# Patient Record
Sex: MALE | Race: WHITE | Employment: FULL TIME | URBAN - METROPOLITAN AREA
[De-identification: names, ages, dates, MRNs, and addresses within clinical notes are randomized per-mention and may not be internally consistent; named-entity substitution may affect disease eponyms.]

---

## 2017-11-16 ENCOUNTER — OFFICE VISIT (OUTPATIENT)
Dept: FAMILY MEDICINE CLINIC | Age: 47
End: 2017-11-16

## 2017-11-16 VITALS
OXYGEN SATURATION: 95 % | HEIGHT: 66 IN | DIASTOLIC BLOOD PRESSURE: 75 MMHG | WEIGHT: 194.4 LBS | SYSTOLIC BLOOD PRESSURE: 113 MMHG | RESPIRATION RATE: 20 BRPM | TEMPERATURE: 97 F | BODY MASS INDEX: 31.24 KG/M2 | HEART RATE: 70 BPM

## 2017-11-16 DIAGNOSIS — G89.29 CHRONIC PAIN OF LEFT ANKLE: ICD-10-CM

## 2017-11-16 DIAGNOSIS — M25.572 CHRONIC PAIN OF LEFT ANKLE: ICD-10-CM

## 2017-11-16 DIAGNOSIS — M25.522 ELBOW PAIN, LEFT: Primary | ICD-10-CM

## 2017-11-16 NOTE — PROGRESS NOTES
Chief Complaint   Patient presents with    Elbow Pain     left x 2 months    Ankle Pain     left x 6 months       Reviewed record in preparation for visit and have obtained necessary documentation. Identified pt with two pt identifiers(name and ). Chief Complaint   Patient presents with    Elbow Pain     left x 2 months    Ankle Pain     left x 6 months       Vitals:    17 1114   BP: 113/75   Pulse: 70   Resp: 20   Temp: 97 °F (36.1 °C)   TempSrc: Oral   SpO2: 95%   Weight: 194 lb 6.4 oz (88.2 kg)   Height: 5' 6\" (1.676 m)   PainSc:   4       Coordination of Care Questionnaire:  :     1) Have you been to an emergency room, urgent care clinic since your last visit? no   Hospitalized since your last visit? no             2) Have you seen or consulted any other health care providers outside of [address] [place] [address] since your last visit? no  (Include any pap smears or colon screenings in this section.)    3) In the event something were to happen to you and you were unable to speak on your behalf, do you have an Advance Directive/ Living Will in place stating your wishes? NO    Do you have an Advance Directive on file? no    4) Are you interested in receiving information on Advance Directives? NO    Patient is accompanied by self I have received verbal consent from Veronica Stokes to discuss any/all medical information while they are present in the room.

## 2017-11-16 NOTE — MR AVS SNAPSHOT
Visit Information Date & Time Provider Department Dept. Phone Encounter #  
 11/16/2017 11:00 AM Palma Chun NP [facility] [phone] [id number] Upcoming Health Maintenance Date Due Pneumococcal 19-64 Medium Risk (1 of 1 - PPSV23) 11/12/1989 DTaP/Tdap/Td series (2 - Tdap) 11/12/1991 Influenza Age 5 to Adult 8/1/2017 Allergies as of 11/16/2017  Review Complete On: 1/20/2016 By: Melissa Salcedo Severity Noted Reaction Type Reactions Cymbalta [Duloxetine]  08/30/2010    Other (comments) Erectile dysfunction Prozac [Fluoxetine]  08/30/2010    Other (comments) Decreased libido and erection Current Immunizations  Reviewed on 11/30/2010 Name Date  
 TD Vaccine 6/1/1988 Not reviewed this visit You Were Diagnosed With   
  
 Codes Comments Elbow pain, left    -  Primary ICD-10-CM: N48.309 ICD-9-CM: 719.42 Chronic pain of left ankle     ICD-10-CM: M25.572, G89.29 ICD-9-CM: 719.47, 338.29 Vitals BP Pulse Temp Resp Height(growth percentile) Weight(growth percentile) 113/75 (BP 1 Location: Right arm, BP Patient Position: Sitting) 70 97 °F (36.1 °C) (Oral) 20 5' 6\" (1.676 m) 194 lb 6.4 oz (88.2 kg) SpO2 BMI Smoking Status 95% 31.38 kg/m2 Current Every Day Smoker BMI and BSA Data Body Mass Index Body Surface Area  
 31.38 kg/m 2 2.03 m 2 Preferred Pharmacy Pharmacy Name Phone [facility] [place], [place] - [address] [place] [address] Javed Singh [phone] Your Updated Medication List  
  
   
This list is accurate as of: 11/16/17 11:54 AM.  Always use your most recent med list.  
  
  
  
  
 ibuprofen 200 mg tablet Commonly known as:  MOTRIN Take  by mouth.  
  
 pramoxine-hydrocortisone 1-1 % topical cream  
Commonly known as:  ANALPRAM HC Apply  to affected area three (3) times daily. We Performed the Following REFERRAL TO SPORTS MEDICINE [HEN340 Custom] Comments:  
 Please evaluate and treat for left elbow and left ankle pain Referral Information Referral ID Referred By Referred To  
  
 [id number] [facility]   
   [address] [place] [address], [address] Phone: [phone] Visits Status Start Date End Date 1 New Request 11/16/17 11/16/18 If your referral has a status of pending review or denied, additional information will be sent to support the outcome of this decision. Patient Instructions 1) Referral to Dr. Beatriz Thomas, Sports Medicine. Please make an appointment with Dr. Chavo Dao. His office is located at [facility] ([address] [place] [address]) ph: [phone] 
 
2) You can use RICE (Rest, ice, Compress, Elevate). Apply a compressive ACE bandage. Rest and elevate the affected painful area. Apply cold compresses intermittently as needed. As pain recedes, begin normal activities slowly as tolerated. Stretch ankle:  
  Exercises Your Care Instructions Here are some examples of exercises for your achilles tendon. Start each exercise slowly. Ease off the exercise if you start to have pain. Your doctor or physical therapist will tell you when you can start these exercises and which ones will work best for you. How to do the exercises Toe stretch 1. Sit in a chair, and extend your affected leg so that your heel is on the floor. 2. With your hand, reach down and pull your big toe up and back. Pull toward your ankle and away from the floor. 3. Hold the position for at least 15 to 30 seconds. 4. Repeat 2 to 4 times a session, several times a day. Calf-plantar fascia stretch 1. Sit with your legs extended and knees straight. 2. Place a towel around your foot just under the toes. 3. Hold each end of the towel in each hand, with your hands above your knees. 4. Pull back with the towel so that your foot stretches toward you. 5. Hold the position for at least 15 to 30 seconds. 6. Repeat 2 to 4 times a session, up to 5 sessions a day. Floor stretch 1. Stand about 2 feet from a wall, and place your hands on the wall at about shoulder height. Or you can stand behind a chair, placing your hands on the back of it for balance. 2. Step back with the leg you want to stretch. Keep the leg straight, and press your heel into the floor with your toe turned slightly in. 
3. Lean forward, and bend your other leg slightly. Feel the stretch in the Achilles tendon of your back leg. Hold for at least 15 to 30 seconds. 4. Repeat 2 to 4 times a session, up to 5 sessions a day. Stair stretch 1. Stand with the balls of both feet on the edge of a step or curb (or a medium-sized phone book). With at least one hand, hold onto something solid for balance, such as a banister or handrail. 2. Keeping your affected leg straight, slowly let that heel hang down off of the step or curb until you feel a stretch in the back of your calf and/or Achilles area. Some of your weight should still be on the other leg. 3. Hold this position for at least 15 to 30 seconds. 4. Repeat 2 to 4 times a session, up to 5 times a day or whenever your Achilles tendon starts to feel tight. This stretch can also be done with your knee slightly bent. Strength exercise 1. This exercise will get you started on building strength after an Achilles tendon injury. Your doctor or physical therapist can help you move on to more challenging exercises as you heal and get stronger. 2. Stand on a step with your heel off the edge of the step. Hold on to a handrail or wall for balance. 3. Push up on your toes, then slowly count to 10 as you lower yourself back down until your heel is below the step.  If it hurts to push up on your toes, try putting most of your weight on your other foot as you push up, or try using your arms to help you. If you can't do this exercise without causing pain, stop the exercise and talk to your doctor. 4. Repeat the exercise 8 to 12 times, half with the knee straight and half with the knee bent. Follow-up care is a key part of your treatment and safety. Be sure to make and go to all appointments, and call your doctor if you are having problems. It's also a good idea to know your test results and keep a list of the medicines you take. Where can you learn more? Go to http://joao-lilliana.info/. Enter D662 in the search box to learn more about \"Achilles Tendon: Exercises. \" Current as of: March 21, 2017 Content Version: 11.4 © 5737-6839 [employer]. Care instructions adapted under license by [employer] (which disclaims liability or warranty for this information). If you have questions about a medical condition or this instruction, always ask your healthcare professional. [place] [address] any warranty or liability for your use of this information. Healthy Weight Body Mass Index is a noninvasive way to screen for weight and body fat. This is a mathematical calculation based on your height and weight. A healthy BMI is between 20% -24.9%. Your BMI is 31 %. There is a relationship between high BMI and various healthy problems, such as osteoarthritis, muscle pain, increased risk of cancer, diabetes, heart disease, stroke, hypertension, high cholesterol, sleep apnea, breathing problems, depression, which is why weight loss is so important. In terms of weight loss, a 5-10% reduction in body weight over 3-6 months is a reasonable goal.  There would likely be improvements in risk for disease such as diabetes and heart disease, with this amount of weight loss.   
 
In order to lose weight, try reducing your daily intake of calories by decreasing portion size of food and increase exercise to help reduce weight. Eat 3-5 small meals per day instead of 3 large meals. Choose lean meats for protein source which include chicken, pork, and turkey. The recommended serving size for protein is a 2-3 oz serving (the size of your fist), and 1-1.5 oz of carbohydrate per meal (about 1 cup). Increase servings of fruits and vegetables. Limit processed carbohydrates, (i.e. most breads, crackers, pasta, chips, rice, breaded or battered food, etc). If you choose to eat carbohydrates, whole wheat, (instead of white) is a healthier option for bread, rice, and crackers. Avoid fried foods. Limit sugar and do not drink alcohol, juice, sodas or sweet teas. Drink plenty of water (at least 64 oz/day). Daily exercise will also help with weight loss and overall health. A minimum of 150 minutes a week of moderate exercise is recommended (30 minutes per day). Make exercise a routine part of your day - for example, park in spaces far away from [facility], take stairs instead of elevator, if sitting for long periods, get up from chair and walk every hour. Recruit a friend or relative to exercise with you and keep you on schedule. It is much easier to exercise with a eric who will make sure you work out each day! [place] [employer]ONG is a eight week mixed martial arts (MMA) based workout. It has 5 main workout DVDs, each one is 45 minutes consisting of a 10 minute warm-up, five 5 minute workouts and a 6 minute stretching/cool down. It is easy to follow, with options to modify each move and you only need hand weights and some space to do the work out. Meal planning smart phone applications like InvitedHome can help plan healthy meals. Get 7-8 hours of sleep each night. You may wish to consider seeing the nutritionist at [facility] ([phone]), [facility] ([phone]) or [facility] ([phone]). For reliable dietary information, go to www. EATRIGHT.org. 
 
 Free smart phone application to help manage weight loss: MyFitnessPal = tracks food intake, exercise and weight. Daily nutritional summary. Meal  Yoga is a type of exercise in which you move your body into various positions in order to become more fit or flexible, to improve your breathing, and to relax your mind. It dates back over 5,000 years with roots in [address] [facility]. There are numerous styles of yoga, but overall, yoga combines meditation/relaxation, physical movements, and breathing techniques. According to the [place], scientific research has shown yoga can reduce pain and improve function in people suffering from chronic lower back pain. Other studies also suggest that practicing yoga (as well as other forms of regular exercise) might improve quality of life; reduce stress; lower heart rate and blood pressure; help relieve anxiety, depression, and insomnia; and improve overall physical fitness, strength, and flexibility. Everyones body is different, and although yoga is low impact and safe for most people,  postures should be modified based on individual abilities. People with high blood pressure, glaucoma, or sciatica, and women who are pregnant should modify or avoid some yoga poses. The movements of yoga are subtle, so it is a good idea to start with professional instructions to ensure you are doing it correctly. Carefully selecting an instructor who is experienced and attentive to your needs is an important step toward helping you practice yoga safely. There are many gyms and yoga studios in this area that offer free trial classes so sign up for one and explore! Introducing [facility]! Dear Lilia Degroot: 
Thank you for requesting a [employer] account. Our records indicate that you already have an active [employer] account. You can access your account anytime at [email or web address] Did you know that you can access your hospital and ER discharge instructions at any time in [employer]? You can also review all of your test results from your hospital stay or ER visit. Additional Information If you have questions, please visit the Frequently Asked Questions section of the [employer] website at [email or web address] Remember, Covestor is NOT to be used for urgent needs. For medical emergencies, dial 911. Now available from your iPhone and Android! Please provide this summary of care documentation to your next provider. Your primary care clinician is listed as Earl Gonzales. If you have any questions after today's visit, please call [phone].

## 2017-11-16 NOTE — PROGRESS NOTES
S: Cecelia Singer is a 52 y.o. male who presents for elbow pain and ankle pain    Assessment/Plan:  1. Elbow pain, left -possible bursitis   Chronic pain of left ankle -tendonitis and/or arthritis  - refer to Dr. Travis Blakely who can do an xray and [address] at office at same time and offer cortisone inj  -discussed RICE, healthy diet and exercise, including stretching, yoga and massage         HPI:  Went to ortho for neck and ankle pain  Pain mgmt for neck pain (cervical cortisone shots and then fell a year ago at work and hurt it again)  Started chiropractor last month and seems to be helping    Was given meloxicam in past, but doesn't help with current pain  Has been taking OTC NSAIDs and ankle doesn't hurt as much today    Left elbow pain  (right handed)   No trauma/injury  Hurts when lifts things with arm  No numbness/Tingling  No pain radiating  No past surgeries or injuries  No golf or tennis activities -     Left ankle pain for past 6  months  Mechanism of Injury/Trauma - rolled a few times over past several years  ROM - decreased   No Numbness/Tingling  No past Surgeries - did have injury when 11-6 yo - had glass cut deep     Social History:  Nutrition: cutting back on ETOH and trying to eat better  Smoker: vape 1-2/canisters; using 0.3 nicotine level  1/2ppd- 1ppd - quit a few years ago (smoked since 15 yo)  Occupation:  for [employer]    Review of Systems:  - Constitutional Symptoms: no fevers or chills  - Cardiovascular: no chest pain or palpitations  - Respiratory: no cough or shortness of breath  - Integumentary: no bruising or rashs  - Neurological: no numbness or tingling    I reviewed the following:    Past Medical History:   Diagnosis Date    Anxiety state, unspecified     Basal cell carcinoma     Calculus of kidney 8/17/2014    Left 6mm. Left 1 mm. Dr. Meredith Freedman. 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 Dr. Meseret Lua.     Other and unspecified hyperlipidemia     Shin splints 04/24/08    Dr. Vince Torrez. [facility]    Shoulder pain 05/16/01    Right. Tendinitis. Dr. Saba Generous    Tinnitus 2007    Right. Dr. Leonardo Wilcoxmon       Current Outpatient Prescriptions   Medication Sig Dispense Refill    ibuprofen (MOTRIN) 200 mg tablet Take  by mouth.  pramoxine-hydrocortisone (ANALPRAM HC) 1-1 % topical cream Apply  to affected area three (3) times daily. 30 g 0       Allergies   Allergen Reactions    Cymbalta [Duloxetine] Other (comments)     Erectile dysfunction      Prozac [Fluoxetine] Other (comments)     Decreased libido and erection        O: VS:   Visit Vitals    /75 (BP 1 Location: Right arm, BP Patient Position: Sitting)    Pulse 70    Temp 97 °F (36.1 °C) (Oral)    Resp 20    Ht 5' 6\" (1.676 m)    Wt 194 lb 6.4 oz (88.2 kg)    SpO2 95%    BMI 31.38 kg/m2       GENERAL Rodriguez Glatter is in no acute distress. Non-toxic. Well nourished. Well developed. Appropriately groomed. RESP: Breath sounds are symmetrical bilaterally. Unlabored without SOB. Speaking in full sentences. Clear to auscultation bilaterally anteriorly and posteriorly. No wheezes. No rales or rhonchi. CV: normal rate. Regular rhythm. S1, S2 audible. No murmur noted. No rubs, clicks or gallops noted. MUSC:  Intact x 4 extremities. Left Elbow Inspection and Palpation: no deformity, muscle atrophy, erythema, edema or ecchymosis noted in medial, posterior, lateral or anterior. Bicep muscle: no edema, erythema, ecchymosis or muscle atrophy  Brachialias muscle: no edema, erythema, ecchymosis or muscle atrophy  ROM: No decreased movement. No crepitus noted. Medial Epicondyle: No edema or pain on palpation. Lateral Epicondyle: No edema or pain on palpation. Bursa: No edema, mild pain on palpation. Left Ankle: Inspection and Palpation: no deformity, muscle atrophy, erythema, edema or ecchymosis noted.    Gait: steady  ROM: No decreased movement with Plantar Flexion, Dorisflexion, Inversion/Eversion and Toe flexion/extension  No crepitus noted. Strength: Full Dorsiflexion, Plantar flexion and Inversion/Eversion  Bonds Squeeze - Negative  Valentina tilt : negative   Squeeze (Anterior tibiofibular ligament) -negative  HEME/LYMPH: peripheral pulses palpable 2+ x 4 extremities. SKIN: Skin is warm and dry. Turgor is normal. No petechiae, no purpura, no rash. No cyanosis. No jaundice or pallor. _____________________________________________________________________  Patient education was done. Advised on nutrition, physical activity, tobacco, alcohol and safety. Counseling included discussion of diagnosis, differentials, treatment options, prescribed treatment, warning signs and follow up. Medication risks/benefits, interactions and alternatives discussed with patient.      Patient verbalized understanding and agreed to plan of care. Patient was given an after visit summary which included current diagnoses, medications and vital signs. Follow up as directed or if symptoms progress.

## 2017-11-16 NOTE — PATIENT INSTRUCTIONS
1) Referral to Dr. Kanchan Gonzales, Sports Medicine. Please make an appointment with Dr. Guillermina Medeiros. His office is located at [facility] ([address] [place] [address]) ph: [phone]    2) You can use RICE (Rest, ice, Compress, Elevate). Apply a compressive ACE bandage. Rest and elevate the affected painful area. Apply cold compresses intermittently as needed. As pain recedes, begin normal activities slowly as tolerated. Stretch ankle:     Exercises  Your Care Instructions  Here are some examples of exercises for your achilles tendon. Start each exercise slowly. Ease off the exercise if you start to have pain. Your doctor or physical therapist will tell you when you can start these exercises and which ones will work best for you. How to do the exercises  Toe stretch    1. Sit in a chair, and extend your affected leg so that your heel is on the floor. 2. With your hand, reach down and pull your big toe up and back. Pull toward your ankle and away from the floor. 3. Hold the position for at least 15 to 30 seconds. 4. Repeat 2 to 4 times a session, several times a day. Calf-plantar fascia stretch    1. Sit with your legs extended and knees straight. 2. Place a towel around your foot just under the toes. 3. Hold each end of the towel in each hand, with your hands above your knees. 4. Pull back with the towel so that your foot stretches toward you. 5. Hold the position for at least 15 to 30 seconds. 6. Repeat 2 to 4 times a session, up to 5 sessions a day. Floor stretch    1. Stand about 2 feet from a wall, and place your hands on the wall at about shoulder height. Or you can stand behind a chair, placing your hands on the back of it for balance. 2. Step back with the leg you want to stretch. Keep the leg straight, and press your heel into the floor with your toe turned slightly in.  3. Lean forward, and bend your other leg slightly. Feel the stretch in the Achilles tendon of your back leg.  Hold for at least 15 to 30 seconds. 4. Repeat 2 to 4 times a session, up to 5 sessions a day. Stair stretch    1. Stand with the balls of both feet on the edge of a step or curb (or a medium-sized phone book). With at least one hand, hold onto something solid for balance, such as a banister or handrail. 2. Keeping your affected leg straight, slowly let that heel hang down off of the step or curb until you feel a stretch in the back of your calf and/or Achilles area. Some of your weight should still be on the other leg. 3. Hold this position for at least 15 to 30 seconds. 4. Repeat 2 to 4 times a session, up to 5 times a day or whenever your Achilles tendon starts to feel tight. This stretch can also be done with your knee slightly bent. Strength exercise    1. This exercise will get you started on building strength after an Achilles tendon injury. Your doctor or physical therapist can help you move on to more challenging exercises as you heal and get stronger. 2. Stand on a step with your heel off the edge of the step. Hold on to a handrail or wall for balance. 3. Push up on your toes, then slowly count to 10 as you lower yourself back down until your heel is below the step. If it hurts to push up on your toes, try putting most of your weight on your other foot as you push up, or try using your arms to help you. If you can't do this exercise without causing pain, stop the exercise and talk to your doctor. 4. Repeat the exercise 8 to 12 times, half with the knee straight and half with the knee bent. Follow-up care is a key part of your treatment and safety. Be sure to make and go to all appointments, and call your doctor if you are having problems. It's also a good idea to know your test results and keep a list of the medicines you take. Where can you learn more? Go to http://joao-lilliana.info/. Enter J893 in the search box to learn more about \"Achilles Tendon: Exercises. \"  Current as of: March 21, 2017  Content Version: 11.4  © 0367-9028 [employer]. Care instructions adapted under license by [employer] (which disclaims liability or warranty for this information). If you have questions about a medical condition or this instruction, always ask your healthcare professional. [address] any warranty or liability for your use of this information. Healthy Weight  Body Mass Index is a noninvasive way to screen for weight and body fat. This is a mathematical calculation based on your height and weight. A healthy BMI is between 20% -24.9%. Your BMI is 31 %. There is a relationship between high BMI and various healthy problems, such as osteoarthritis, muscle pain, increased risk of cancer, diabetes, heart disease, stroke, hypertension, high cholesterol, sleep apnea, breathing problems, depression, which is why weight loss is so important. In terms of weight loss, a 5-10% reduction in body weight over 3-6 months is a reasonable goal.  There would likely be improvements in risk for disease such as diabetes and heart disease, with this amount of weight loss. In order to lose weight, try reducing your daily intake of calories by decreasing portion size of food and increase exercise to help reduce weight. Eat 3-5 small meals per day instead of 3 large meals. Choose lean meats for protein source which include chicken, pork, and turkey. The recommended serving size for protein is a 2-3 oz serving (the size of your fist), and 1-1.5 oz of carbohydrate per meal (about 1 cup). Increase servings of fruits and vegetables. Limit processed carbohydrates, (i.e. most breads, crackers, pasta, chips, rice, breaded or battered food, etc). If you choose to eat carbohydrates, whole wheat, (instead of white) is a healthier option for bread, rice, and crackers. Avoid fried foods. Limit sugar and do not drink alcohol, juice, sodas or sweet teas.    Drink plenty of water (at least 64 oz/day). Daily exercise will also help with weight loss and overall health. A minimum of 150 minutes a week of moderate exercise is recommended (30 minutes per day). Make exercise a routine part of your day - for example, park in spaces far away from [facility], take stairs instead of elevator, if sitting for long periods, get up from chair and walk every hour. Recruit a friend or relative to exercise with you and keep you on schedule. It is much easier to exercise with a eric who will make sure you work out each day! Rusdoo is a eight week mixed martial arts (MMA) based workout. It has 5 main workout DVDs, each one is 45 minutes consisting of a 10 minute warm-up, five 5 minute workouts and a 6 minute stretching/cool down. It is easy to follow, with options to modify each move and you only need hand weights and some space to do the work out. Meal planning smart phone applications like Haileo can help plan healthy meals. Get 7-8 hours of sleep each night. You may wish to consider seeing the nutritionist at [facility] ([phone]), [facility] ([phone]) or [facility] ([phone]). For reliable dietary information, go to www. EATRIGHT.org.    Free smart phone application to help manage weight loss: MyFitnessPal = tracks food intake, exercise and weight. Daily nutritional summary. Meal        Yoga is a type of exercise in which you move your body into various positions in order to become more fit or flexible, to improve your breathing, and to relax your mind. It dates back over 5,000 years with roots in [address] [facility]. There are numerous styles of yoga, but overall, yoga combines meditation/relaxation, physical movements, and breathing techniques. According to the [place], scientific research has shown yoga can reduce pain and improve function in people suffering from chronic lower back pain.   Other studies also suggest that practicing yoga (as well as other forms of regular exercise) might improve quality of life; reduce stress; lower heart rate and blood pressure; help relieve anxiety, depression, and insomnia; and improve overall physical fitness, strength, and flexibility. Everyones body is different, and although yoga is low impact and safe for most people,  postures should be modified based on individual abilities. People with high blood pressure, glaucoma, or sciatica, and women who are pregnant should modify or avoid some yoga poses. The movements of yoga are subtle, so it is a good idea to start with professional instructions to ensure you are doing it correctly. Carefully selecting an instructor who is experienced and attentive to your needs is an important step toward helping you practice yoga safely. There are many gyms and yoga studios in this area that offer free trial classes so sign up for one and explore!

## 2017-11-21 ENCOUNTER — OFFICE VISIT (OUTPATIENT)
Dept: INTERNAL MEDICINE CLINIC | Age: 47
End: 2017-11-21

## 2017-11-21 VITALS
BODY MASS INDEX: 31.26 KG/M2 | OXYGEN SATURATION: 98 % | RESPIRATION RATE: 16 BRPM | HEIGHT: 66 IN | HEART RATE: 90 BPM | SYSTOLIC BLOOD PRESSURE: 138 MMHG | TEMPERATURE: 98.3 F | WEIGHT: 194.5 LBS | DIASTOLIC BLOOD PRESSURE: 85 MMHG

## 2017-11-21 DIAGNOSIS — M25.522 LEFT ELBOW PAIN: ICD-10-CM

## 2017-11-21 DIAGNOSIS — M77.12 LATERAL EPICONDYLITIS OF LEFT ELBOW: Primary | ICD-10-CM

## 2017-11-21 DIAGNOSIS — M25.572 ACUTE LEFT ANKLE PAIN: ICD-10-CM

## 2017-11-21 DIAGNOSIS — M76.72 PERONEAL TENDINITIS OF LEFT LOWER EXTREMITY: ICD-10-CM

## 2017-11-21 NOTE — PROGRESS NOTES
Chief Complaint   Patient presents with    Ankle Pain    Elbow Pain     he is a 52y.o. year old male who presents for evaluation of Left elbow and ankle pain  Pain Assessment Encounter      Kyree Carey  11/21/2017  Onset of Symptoms: Several Months  ________________________________________________________________________  Description:Ache    Frequency: more than 5 times a day  Pain Scale:(1-10): 1  Trauma Hx: Unknown  Hx of similar symptoms: No  Radiation: None  Duration:  continuous      Progression: has improved  What makes it better?: rest  What makes it worse?:walking  Medications tried: ibuprofen    Reviewed and agree with Nurse Note and duplicated in this note. Reviewed PmHx, RxHx, FmHx, SocHx, AllgHx and updated and dated in the chart. Family History   Problem Relation Age of Onset    Hypertension Mother     Arthritis-osteo Mother     High Cholesterol Mother     Cancer Father      melanoma, ?lung       Past Medical History:   Diagnosis Date    Anxiety state, unspecified     Basal cell carcinoma     Calculus of kidney 8/17/2014    Left 6mm. Left 1 mm. Dr. Angie Pate. 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 Dr. Oli Wilson.  Other and unspecified hyperlipidemia     Shin splints 04/24/08    Dr. Yadi Epstein. Gastoc Equines    Shoulder pain 05/16/01    Right. Tendinitis. Dr. Juan Manuel Mason    Tinnitus 2007    Right.  Dr. Shannan Arora      Social History     Social History    Marital status: SINGLE     Spouse name: N/A    Number of children: N/A    Years of education: N/A     Social History Main Topics    Smoking status: Current Every Day Smoker     Packs/day: 0.50     Years: 24.00     Types: Cigarettes    Smokeless tobacco: Never Used      Comment: Vapor    Alcohol use 3.0 - 5.0 oz/week     6 - 10 Cans of beer per week    Drug use: No    Sexual activity: Yes     Partners: Female     Other Topics Concern    None     Social History Narrative Review of Systems - negative except as listed above      Objective:     Vitals:    11/21/17 1445   BP: 138/85   Pulse: 90   Resp: 16   Temp: 98.3 °F (36.8 °C)   TempSrc: Oral   SpO2: 98%   Weight: 194 lb 8 oz (88.2 kg)   Height: 5' 6\" (1.676 m)       Physical Examination: General appearance - alert, well appearing, and in no distress  Back exam - full range of motion, no tenderness, palpable spasm or pain on motion  Neurological - alert, oriented, normal speech, no focal findings or movement disorder noted  Musculoskeletal -  A left elbow exam was performed. The patient is Left hand dominant. ELBOW EXAM performed   SWELLING: none  EFFUSION: none  TENDERNESS: mild at lateral epicondyle     A left ankle exam was performed. Swelling: none  Tenderness: none  Strength: normal    Palpation:  ATFL:  non-tender  CFL:  non-tender  PTFL:  non-tender  Syndesmosis Ligament:  non-tender  Deltoid ligament:  non-tender  Posterior Tibialis Tendon:  non-tender  Peroneal Tendon: tender  Achilles Tendon:  non-tender     Proximal Fibula:  non-tender  Proximal Tibia:  non-tender  Distal Fibula:  non-tender  Distal Tibia:  non-tender    Plantar Fascia: non-tender  Midfoot:  non-tender  Forefoot:  non-tender    ROM:  Plantar Flexion:  normal  Dorsiflexion:  normal    Squeeze Test: negative  Talar Tilt Test:  negative  Anterior Drawer:negative    Kleiger Test:  negative  Hop Test: negative  [place]: negative    Extremities - peripheral pulses normal, no pedal edema, no clubbing or cyanosis  Skin - normal coloration and turgor, no rashes, no suspicious skin lesions noted    Assessment/ Plan:   Diagnoses and all orders for this visit:    1. Lateral epicondylitis of left elbow  -     AMB POC US,EXTREMITY,NONVASCULAR,REAL-TIME IMAGE,LIMITED (88414)    2. Peroneal tendinitis of left lower extremity    3. Left elbow pain  -     XR ELBOW LT MIN 3 V; Future  -     AMB POC US,EXTREMITY,NONVASCULAR,REAL-TIME IMAGE,LIMITED (27169)    4. Acute left ankle pain  -     XR ANKLE LT MIN 3 V; Future         Pathophysiology, recovery and rehabilitation process discussed and questions answered   Counseling for 30 Minutes of the total visit duration   Pictures and figures used as necessary   Provided reassurance   Monitor response to Physical Therapy   Recommend  lower impact activities-walking, Eliptical, Nordic Track, cycling or swimming   Follow up in 4 week(s)  Xray's reviewed - within normal limits     :  1) Remember to stay active and/or exercise regularly (I suggest 30-45 minutes daily)   2) For reliable dietary information, go to www. EATRIGHT.org. You may wish to consider seeing the nutritionist at [facility] [phone], also consider the [address] I have discussed the diagnosis with the patient and the intended plan as seen in the above orders. The patient has received an after-visit summary and questions were answered concerning future plans. Medication Side Effects and Warnings were discussed with patient: yes  Patient Labs were reviewed and or requested: yes  Patient Past Records were reviewed and or requested  yes  I have discussed the diagnosis with the patient and the intended plan as seen in the above orders. The patient has received an after-visit summary and questions were answered concerning future plans. Pt agrees to call or return to clinic and/or go to closest ER with any worsening of symptoms. This may include, but not limited to increased fever (>100.4) with NSAIDS or Tylenol, increased edema, confusion, rash, worsening of presenting symptoms.

## 2017-12-05 ENCOUNTER — OFFICE VISIT (OUTPATIENT)
Dept: INTERNAL MEDICINE CLINIC | Age: 47
End: 2017-12-05

## 2017-12-05 VITALS
HEIGHT: 66 IN | SYSTOLIC BLOOD PRESSURE: 125 MMHG | OXYGEN SATURATION: 99 % | DIASTOLIC BLOOD PRESSURE: 83 MMHG | TEMPERATURE: 98.6 F | WEIGHT: 195.8 LBS | RESPIRATION RATE: 16 BRPM | HEART RATE: 70 BPM | BODY MASS INDEX: 31.47 KG/M2

## 2017-12-05 DIAGNOSIS — M67.88 LEFT PERONEAL TENDINOSIS: ICD-10-CM

## 2017-12-05 DIAGNOSIS — M75.41 SHOULDER IMPINGEMENT, RIGHT: Primary | ICD-10-CM

## 2017-12-05 DIAGNOSIS — M77.12 LEFT LATERAL EPICONDYLITIS: ICD-10-CM

## 2017-12-05 NOTE — MR AVS SNAPSHOT
Visit Information Date & Time Provider Department Dept. Phone Encounter #  
 12/5/2017  9:15 AM Darren Paez MD [facility] and [place] [address] [id number] Follow-up Instructions Return in about 4 weeks (around 1/2/2018) for ankle elbow and shoulder pain. Follow-up and Disposition History Upcoming Health Maintenance Date Due Pneumococcal 19-64 Medium Risk (1 of 1 - PPSV23) 11/12/1989 DTaP/Tdap/Td series (2 - Tdap) 11/12/1991 Allergies as of 12/5/2017  Review Complete On: 12/5/2017 By: Darren Paez MD  
  
 Severity Noted Reaction Type Reactions Cymbalta [Duloxetine]  08/30/2010    Other (comments) Erectile dysfunction Prozac [Fluoxetine]  08/30/2010    Other (comments) Decreased libido and erection Current Immunizations  Reviewed on 11/30/2010 Name Date  
 TD Vaccine 6/1/1988 Not reviewed this visit You Were Diagnosed With   
  
 Codes Comments Shoulder impingement, right    -  Primary ICD-10-CM: M75.41 
ICD-9-CM: 726.2 Left peroneal tendinosis     ICD-10-CM: M76.72 
ICD-9-CM: 726.79 Left lateral epicondylitis     ICD-10-CM: M77.12 
ICD-9-CM: 726.32 Vitals BP Pulse Temp Resp Height(growth percentile) Weight(growth percentile) 125/83 (BP 1 Location: Left arm, BP Patient Position: Sitting) 70 98.6 °F (37 °C) (Oral) 16 5' 6\" (1.676 m) 195 lb 12.8 oz (88.8 kg) SpO2 BMI Smoking Status 99% 31.6 kg/m2 Current Every Day Smoker Vitals History BMI and BSA Data Body Mass Index Body Surface Area  
 31.6 kg/m 2 2.03 m 2 Preferred Pharmacy Pharmacy Name Phone [facility] [address] [place], [place] - [address] [place] [address] Stefany Jose [phone] Your Updated Medication List  
  
   
This list is accurate as of: 12/5/17  9:57 AM.  Always use your most recent med list.  
  
  
  
  
 ibuprofen 200 mg tablet Commonly known as:  MOTRIN Take  by mouth. pramoxine-hydrocortisone 1-1 % topical cream  
Commonly known as:  ANALPRAM HC Apply  to affected area three (3) times daily. We Performed the Following REFERRAL TO PHYSICAL THERAPY [OQO85 Custom] Follow-up Instructions Return in about 4 weeks (around 1/2/2018) for ankle elbow and shoulder pain. To-Do List   
 12/05/2017 Imaging:  XR SHOULDER RT AP/LAT MIN 2 V Referral Information Referral ID Referred By Referred To  
  
 [id number] [facility]   
   [address] [facility] [address], [facility] Phone: [phone] Fax: [phone] Visits Status Start Date End Date  
 21 New Request 12/5/17 12/5/18 If your referral has a status of pending review or denied, additional information will be sent to support the outcome of this decision. Introducing [facility] & HEALTH SERVICES! Dear Lilia Degroot: 
Thank you for requesting a [email or web address] account. Our records indicate that you already have an active [email or web address] account. You can access your account anytime at [email or web address] Did you know that you can access your hospital and ER discharge instructions at any time in [email or web address]? You can also review all of your test results from your hospital stay or ER visit. Additional Information If you have questions, please visit the Frequently Asked Questions section of the [email or web address] website at [email or web address] Remember, [email or web address] is NOT to be used for urgent needs. For medical emergencies, dial 911. Now available from your iPhone and Android! Please provide this summary of care documentation to your next provider. Your primary care clinician is listed as Keara Landry. If you have any questions after today's visit, please call [phone].

## 2017-12-05 NOTE — PROGRESS NOTES
Chief Complaint   Patient presents with    Ankle Pain    Elbow Pain     he is a 52y.o. year old male who presents for follow up of injury. Follow Up Pain Assessment Encounter      Onset of Symptoms: Several Months  ________________________________________________________________________  Description: Pain has improved      Pain Scale:(1-10): 4  Duration:  intermittent  What makes it better?: rest  What makes it worse?:use  Medications tried: None  Modalities tried: None        Reviewed and agree with Nurse Note and duplicated in this note. Reviewed PmHx, RxHx, FmHx, SocHx, AllgHx and updated and dated in the chart. Family History   Problem Relation Age of Onset    Hypertension Mother     Arthritis-osteo Mother     High Cholesterol Mother     Cancer Father      melanoma, ?lung       Past Medical History:   Diagnosis Date    Anxiety state, unspecified     Basal cell carcinoma     Calculus of kidney 8/17/2014    Left 6mm. Left 1 mm. Dr. Maykel Quintana. 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 Dr. Yari Arroyo.  Other and unspecified hyperlipidemia     Shin splints 04/24/08    Dr. Gianfranco Nichols. Gastoc Equines    Shoulder pain 05/16/01    Right. Tendinitis. Dr. Lissy Modi    Tinnitus 2007    Right.  Dr. Doyle Arthur      Social History     Social History    Marital status: SINGLE     Spouse name: N/A    Number of children: N/A    Years of education: N/A     Social History Main Topics    Smoking status: Current Every Day Smoker     Packs/day: 0.50     Years: 24.00     Types: Cigarettes    Smokeless tobacco: Never Used      Comment: Vapor    Alcohol use 3.0 - 5.0 oz/week     6 - 10 Cans of beer per week    Drug use: No    Sexual activity: Yes     Partners: Female     Other Topics Concern    None     Social History Narrative        Review of Systems - negative except as listed above      Objective:     Vitals:    12/05/17 0918   BP: 125/83   Pulse: 70   Resp: 16 Temp: 98.6 °F (37 °C)   TempSrc: Oral   SpO2: 99%   Weight: 195 lb 12.8 oz (88.8 kg)   Height: 5' 6\" (1.676 m)       Physical Examination: General appearance - alert, well appearing, and in no distress  Back exam - full range of motion, no tenderness, palpable spasm or pain on motion  Neurological - alert, oriented, normal speech, no focal findings or movement disorder noted  Musculoskeletal -  A left elbow exam was performed. The patient is Left hand dominant. ELBOW EXAM performed   SWELLING: none  EFFUSION: none  TENDERNESS: mild at lateral epicondyle     A left ankle exam was performed. Swelling: none  Tenderness: none  Strength: normal    Palpation:  ATFL:  non-tender  CFL:  non-tender  PTFL:  non-tender  Syndesmosis Ligament:  non-tender  Deltoid ligament:  non-tender  Posterior Tibialis Tendon:  non-tender  Peroneal Tendon: tender  Achilles Tendon:  non-tender     Proximal Fibula:  non-tender  Proximal Tibia:  non-tender  Distal Fibula:  non-tender  Distal Tibia:  non-tender    Plantar Fascia: non-tender  Midfoot:  non-tender  Forefoot:  non-tender    ROM:  Plantar Flexion:  normal  Dorsiflexion:  normal    Squeeze Test: negative  Talar Tilt Test:  negative  Anterior Drawer:negative    Kleiger Test:  negative  Hop Test: negative  [place]: negative    The right shoulder is  normal to inspection. The patient has full range of motion  . The shoulderis not tender to palpation . Bicep tendon: non-tender  The NEER test is positive. The Church test:is positive   The Cross over test:is  negative. The Empty Can test:is  negative. Stressed ext rotation is:  negative. Stressed int rotation: negative. The Apprehension Sign is negative. The Lift off test is:  negative   Examination reveals the Painful Arch:  negative. The Labral Test is:  negative. The Sulcus Sign is:  negative.     Extremities - peripheral pulses normal, no pedal edema, no clubbing or cyanosis  Skin - normal coloration and turgor, no rashes, no suspicious skin lesions noted    Assessment/ Plan:   Diagnoses and all orders for this visit:    1. Shoulder impingement, right  -     XR SHOULDER RT AP/LAT MIN 2 V; Future  -     REFERRAL TO PHYSICAL THERAPY    2. Left peroneal tendinosis  -     REFERRAL TO PHYSICAL THERAPY    3. Left lateral epicondylitis  -     REFERRAL TO PHYSICAL THERAPY       Follow-up Disposition:  Return in about 4 weeks (around 1/2/2018) for ankle elbow and shoulder pain. Pathophysiology, recovery and rehabilitation process discussed and questions answered   Counseling for 30 Minutes of the total visit duration   Pictures and figures used as necessary   Provided reassurance   Monitor response to Physical Therapy   Recommend activity modification   Recommend  lower impact activities-walking, Eliptical, Nordic Track, cycling or swimming   Follow up in 4 week(s)      1) Remember to stay active and/or exercise regularly (I suggest 30-45 minutes daily)   2) For reliable dietary information, go to www. EATRIGHT.org. You may wish to consider seeing the nutritionist at [facility] [phone], also consider the [id number] [place] [address] St. I have discussed the diagnosis with the patient and the intended plan as seen in the above orders. The patient has received an after-visit summary and questions were answered concerning future plans. Medication Side Effects and Warnings were discussed with patient: yes  Patient Labs were reviewed and or requested: yes  Patient Past Records were reviewed and or requested  yes  I have discussed the diagnosis with the patient and the intended plan as seen in the above orders. The patient has received an after-visit summary and questions were answered concerning future plans. Pt agrees to call or return to clinic and/or go to closest ER with any worsening of symptoms.   This may include, but not limited to increased fever (>100.4) with NSAIDS or Tylenol, increased edema, confusion, rash, worsening of presenting symptoms.

## 2017-12-11 ENCOUNTER — APPOINTMENT (OUTPATIENT)
Dept: PHYSICAL THERAPY | Age: 47
End: 2017-12-11
Payer: COMMERCIAL

## 2017-12-12 ENCOUNTER — HOSPITAL ENCOUNTER (OUTPATIENT)
Dept: PHYSICAL THERAPY | Age: 47
Discharge: HOME | End: 2017-12-12
Payer: COMMERCIAL

## 2017-12-12 PROCEDURE — 97110 THERAPEUTIC EXERCISES: CPT | Performed by: PHYSICAL THERAPIST

## 2017-12-12 PROCEDURE — 97161 PT EVAL LOW COMPLEX 20 MIN: CPT | Performed by: PHYSICAL THERAPIST

## 2017-12-12 NOTE — PROGRESS NOTES
[address] [address] (MOB IV), [address] [facility] Paul [place]  Phone: [phone] Fax: [phone]    Plan of Care/Statement of Necessity for Physical Therapy Services  2-15    Patient name: Bennett Pardo  : 1970  Provider#: [id number]  Referral source: Taranais Lyle DO      Medical/Treatment Diagnosis: m75.41  m76.72  m77.12     Prior Hospitalization: see medical history     Comorbidities: chronic ankle sprains, hx of cervical pain, depression, hyperlipidemia   Prior Level of Function: 20min of exercise 1-2x/wk; independent with activities of daily living, patient full time    Medications: Verified on Patient Summary List    Start of Care: 17      Onset Date: chronic >3 months for all pathologies       The Plan of Care and following information is based on the information from the initial evaluation. Assessment/ key information: The patient reports to clinic with chronic right shoulder, left elbow, and left ankle pain. Patient has a very labor intensive job as a  and reports a hx of R shoulder injuries that have lead to pain in the [facility] joint and anterior chest/axillary region. The patient has decreased shoulder IR and joint mobility consistent with posterior shoulder tightness that may be exacerbating AC joint pain and leading to poor scapulothoracic control and strength. Patient has limited cervical rotation bilaterally with a positive spurlings test bilaterally which may indicate some cervical radiculopathy pathology (he does have a history of cervical radiculopathy since  with known DDD). He reports that his left ankle is doing much better overall, so the focus of therapy will be on scapular strengthening and lateral epicondylitis symptoms.  The patient will benefit from guided therapeutic interventions such as therex for strengthening and neuromuscular re-eduction, manual techniques for joint mobility and soft tissue extensibility, and modalities for pain management in order to improve functional mobility with daily activities. Evaluation Complexity History MEDIUM  Complexity : 1-2 comorbidities / personal factors will impact the outcome/ POC ; Examination LOW Complexity : 1-2 Standardized tests and measures addressing body structure, function, activity limitation and / or participation in recreation  ;Presentation LOW Complexity : Stable, uncomplicated  ;Clinical Decision Making MEDIUM Complexity : FOTO score of 26-74  Overall Complexity Rating: LOW     Problem List: pain affecting function, decrease strength, edema affecting function, impaired gait/ balance, decrease ADL/ functional abilitiies, decrease activity tolerance and decrease flexibility/ joint mobility   Treatment Plan may include any combination of the following: Therapeutic exercise, Therapeutic activities, Neuromuscular re-education, Physical agent/modality, Manual therapy, Patient education and Functional mobility training  Patient / Family readiness to learn indicated by: asking questions, trying to perform skills and interest  Persons(s) to be included in education: patient (P)  Barriers to Learning/Limitations: None  Patient Goal (s): be able to tolerate work with less pain and discomfort  Patient Self Reported Health Status: good  Rehabilitation Potential: good    Short Term Goals: To be accomplished in 4 treatments:   1.) Patient will demonstrate independence with HEP consistently   Long Term Goals:  To be accomplished in 16 treatments:   1.) The patient will be able lift and carry 100 lbs of weight with proper form and technique without increase in baseline symptoms in order to simulate gernee carrying.    2.) The patient will report at most 2/10 pain while crossing arm over to touch left shoulder in order to tolerate activities like drying off.    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 Frequency / Duration: Patient to be seen 2 times per week for 16 treatments. Patient/ Caregiver education and instruction: self care, activity modification and exercises    [x]  Plan of care has been reviewed with PTA Roland Klinefelter, PT 12/12/2017 1:18 PM    ________________________________________________________________________    I certify that the above Therapy Services are being furnished while the patient is under my care. I agree with the treatment plan and certify that this therapy is necessary.     [de-identified] Signature:____________________  Date:____________Time: _________

## 2017-12-18 ENCOUNTER — HOSPITAL ENCOUNTER (OUTPATIENT)
Dept: PHYSICAL THERAPY | Age: 47
Discharge: HOME | End: 2017-12-18
Payer: COMMERCIAL

## 2017-12-18 PROCEDURE — 97110 THERAPEUTIC EXERCISES: CPT | Performed by: PHYSICAL THERAPIST

## 2017-12-18 PROCEDURE — 97140 MANUAL THERAPY 1/> REGIONS: CPT | Performed by: PHYSICAL THERAPIST

## 2017-12-18 NOTE — PROGRESS NOTES
PT DAILY TREATMENT NOTE 2-15    Patient Name: Petty Maciel  Date:2017  : 1970  [x]  Patient  Verified  Payor: Daily Ward / Plan: Keshawn Rao / Product Type: HMO /    In time: 11:38am  Out time:12:28pm  Total Treatment Time (min): 50  Visit #: 2     Treatment Area: [facility].41  m.72  [facility].12    SUBJECTIVE  Pain Level (0-10 scale): 4  Any medication changes, allergies to medications, adverse drug reactions, diagnosis change, or new procedure performed?: [x] No    [] Yes (see summary sheet for update)  Subjective functional status/changes:   [] No changes reported  The patient reports that he's been sore, mainly from work. The left elbow has been feeling better. OBJECTIVE    40 min Therapeutic Exercise:  [x] See flow sheet :   Rationale: increase ROM, increase strength and improve coordination to improve the patients ability to perform daily activities. 10 min Manual Therapy:  GH oscillations/distraction; Grade 3/4 A/P mobs; PROM ER/IR; manual pec stretch   Rationale: decrease pain, increase ROM, increase tissue extensibility and decrease trigger points  to improve the patients ability to perform daily activities. With   [x] TE   [] TA   [] neuro   [] other: Patient Education: [x] Review HEP    [x] Progressed/Changed HEP based on:   [x] positioning   [x] body mechanics   [] transfers   [] heat/ice application    [] other:      Other Objective/Functional Measures: Pt. Had some discomfort with SWB I's     Pain Level (0-10 scale) post treatment: 4    ASSESSMENT/Changes in Function:     The patient progressed with more dynamic shoulder and scapular strengthening therex today.  Patient will continue to benefit from skilled PT services to modify and progress therapeutic interventions, address functional mobility deficits, address ROM deficits, address strength deficits, analyze and address soft tissue restrictions, analyze and cue movement patterns, analyze and modify body mechanics/ergonomics, assess and modify postural abnormalities, address imbalance/dizziness and instruct in home and community integration to attain remaining goals. [x]  See Plan of Care  []  See progress note/recertification  []  See Discharge Summary         Progress towards goals / Updated goals: The patient is progressing towards goals.     PLAN  [x]  Upgrade activities as tolerated     [x]  Continue plan of care  [x]  Update interventions per flow sheet       []  Discharge due to:_  []  Other:_      Armando Sorensen, PT 12/18/2017  11:56 AM

## 2017-12-27 ENCOUNTER — HOSPITAL ENCOUNTER (OUTPATIENT)
Dept: PHYSICAL THERAPY | Age: 47
Discharge: HOME | End: 2017-12-27
Payer: COMMERCIAL

## 2017-12-27 PROCEDURE — 97110 THERAPEUTIC EXERCISES: CPT | Performed by: PHYSICAL THERAPIST

## 2017-12-27 PROCEDURE — 97140 MANUAL THERAPY 1/> REGIONS: CPT | Performed by: PHYSICAL THERAPIST

## 2017-12-27 NOTE — PROGRESS NOTES
PT DAILY TREATMENT NOTE 2-15    Patient Name: Rinku Harrison  Date:2017  : 1970  [x]  Patient  Verified  Payor: Radha Kelly / Plan: Misty Chopra / Product Type: HMO /    In time: 4:05pm  Out time:5:00pm  Total Treatment Time (min): 55  Visit #: 3     Treatment Area: [facility]41  [facility]72  [facility].12    SUBJECTIVE  Pain Level (0-10 scale): 2  Any medication changes, allergies to medications, adverse drug reactions, diagnosis change, or new procedure performed?: [x] No    [] Yes (see summary sheet for update)  Subjective functional status/changes:   [] No changes reported  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 OBJECTIVE    45 min Therapeutic Exercise:  [x] See flow sheet :   Rationale: increase ROM, increase strength and improve coordination to improve the patients ability to perform daily activities. 10 min Manual Therapy:  Axillary release; subscap release in sidelying; scapular mobs   Rationale: decrease pain, increase ROM, increase tissue extensibility and decrease trigger points  to improve the patients ability to perform daily activities. With   [x] TE   [] TA   [] neuro   [] other: Patient Education: [x] Review HEP    [x] Progressed/Changed HEP based on:   [x] positioning   [x] body mechanics   [] transfers   [] heat/ice application    [] other:      Other Objective/Functional Measures: Pt. Had some discomfort with SWB I's today     Pain Level (0-10 scale) post treatment: 3    ASSESSMENT/Changes in Function:     The patient progressed with more dynamic strengthening and mobility exercises as tolerated today.  Patient will continue to benefit from skilled PT services to modify and progress therapeutic interventions, address functional mobility deficits, address ROM deficits, address strength deficits, analyze and address soft tissue restrictions, analyze and cue movement patterns, analyze and modify body mechanics/ergonomics, assess and modify postural abnormalities, address imbalance/dizziness and instruct in home and community integration to attain remaining goals. [x]  See Plan of Care  []  See progress note/recertification  []  See Discharge Summary         Progress towards goals / Updated goals: The patient is progressing towards goals.     PLAN  [x]  Upgrade activities as tolerated     [x]  Continue plan of care  [x]  Update interventions per flow sheet       []  Discharge due to:_  []  Other:_      Shannan Gan, PT 12/27/2017  4:06 PM

## 2018-01-02 ENCOUNTER — HOSPITAL ENCOUNTER (OUTPATIENT)
Dept: PHYSICAL THERAPY | Age: 48
Discharge: HOME | End: 2018-01-02
Payer: COMMERCIAL

## 2018-01-02 PROCEDURE — 97110 THERAPEUTIC EXERCISES: CPT | Performed by: PHYSICAL THERAPIST

## 2018-01-02 PROCEDURE — 97140 MANUAL THERAPY 1/> REGIONS: CPT | Performed by: PHYSICAL THERAPIST

## 2018-01-02 NOTE — PROGRESS NOTES
[facility] Physical Therapy  [address] (MOB IV), [address] [place]  Phone: [phone] Fax: [phone]    Progress Note    Name: Dayna Coe   : 1970   MD: Arlet Rolon DO       Treatment Diagnosis: m75.41  m76.72  m77.12  Start of Care: 17    Visits from Start of Care: 4  Missed Visits: 0    Summary of Care: Therapy has included therex and manual techniques for scapular strengthening and ROM. 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 He does report feeling a little better overall, but continues to have some limitations. He has just started to progress with more dynamic stabilization exercises to improve his impingement pain, but continues to have symptoms consistent with [facility] joint arthritis and rotator cuff tendinitis (with underlying cervical DDD). He will benefit from continued therapy to progress with more aggressive therex and treatments for his physically demanding job. Short Term Goals: To be accomplished in 4 treatments:                         1.) Patient will demonstrate independence with HEP consistently- Progressing   Long Term Goals: To be accomplished in 16 treatments:                         1.) The patient will be able lift and carry 100 lbs of weight with proper form and technique without increase in baseline symptoms in order to simulate gernee carrying.- Progressing                         2.) The patient will report at most 2/10 pain while crossing arm over to touch left shoulder in order to tolerate activities like drying off.- Progressing                           3.) 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 - Progressing     Other: Continue 2x/wk for 8 more treatments      Thad Keller PT 1/2/2018 3:25 PM    ________________________________________________________________________  NOTE TO PHYSICIAN:  Please complete the following and fax to: [address] [facility]: [phone]  . Retain this original for your records. If you are unable to process this request in 24 hours, please contact our office.        ____ I have read the above report and request that my patient continue therapy with the following changes/special instructions:  ____ I have read the above report and request that my patient be discharged from therapy    Physician's Signature:_________________ Date:___________Time:__________

## 2018-01-02 NOTE — PROGRESS NOTES
PT DAILY TREATMENT NOTE 2-15    Patient Name: Jaden Willams  Date:2018  : 1970  [x]  Patient  Verified  Payor: Jose Bass / Plan: Ashkan Le / Product Type: HMO /    In time:2:15pm  Out time: 3:15pm  Total Treatment Time (min): 60  Visit #: 4     Treatment Area: [facility]41  [facility]72  [facility]    SUBJECTIVE  Pain Level (0-10 scale): 0  Any medication changes, allergies to medications, adverse drug reactions, diagnosis change, or new procedure performed?: [x] No    [] Yes (see summary sheet for update)  Subjective functional status/changes:   [] No changes reported  The patient reports that he did a lot of work this morning and it didn't bother him much, but 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 OBJECTIVE    45 min Therapeutic Exercise:  [x] See flow sheet :   Rationale: increase ROM, increase strength and improve coordination to improve the patients ability to perform daily activities. 10 min Manual Therapy:  subscap release with scapular mobs in sidelying; axillary release with PROM IR/ER   Rationale: decrease pain, increase ROM, increase tissue extensibility and decrease trigger points  to improve the patients ability to perform daily activities. With   [x] TE   [] TA   [] neuro   [] other: Patient Education: [x] Review HEP    [x] Progressed/Changed HEP based on:   [x] positioning   [x] body mechanics   [] transfers   [] heat/ice application    [] other:      Other Objective/Functional Measures: Pt. Tolerated therex well     Pain Level (0-10 scale) post treatment: 0    ASSESSMENT/Changes in Function:     The patient is progressing well with scapular strengthening therex.  Patient will continue to benefit from skilled PT services to modify and progress therapeutic interventions, address functional mobility deficits, address ROM deficits, address strength deficits, analyze and address soft tissue restrictions, analyze and cue movement patterns, analyze and modify body mechanics/ergonomics, assess and modify postural abnormalities, address imbalance/dizziness and instruct in home and community integration to attain remaining goals. [x]  See Plan of Care  [x]  See progress note/recertification  []  See Discharge Summary         Progress towards goals / Updated goals: The patient is progressing towards goals.     PLAN  [x]  Upgrade activities as tolerated     [x]  Continue plan of care  [x]  Update interventions per flow sheet       []  Discharge due to:_  []  Other:_      Hill Patel, PT 1/2/2018  2:22 PM

## 2018-01-04 ENCOUNTER — OFFICE VISIT (OUTPATIENT)
Dept: INTERNAL MEDICINE CLINIC | Age: 48
End: 2018-01-04

## 2018-01-04 ENCOUNTER — APPOINTMENT (OUTPATIENT)
Dept: PHYSICAL THERAPY | Age: 48
End: 2018-01-04
Payer: COMMERCIAL

## 2018-01-04 VITALS
DIASTOLIC BLOOD PRESSURE: 69 MMHG | WEIGHT: 197.5 LBS | OXYGEN SATURATION: 98 % | TEMPERATURE: 98.3 F | BODY MASS INDEX: 31.74 KG/M2 | SYSTOLIC BLOOD PRESSURE: 120 MMHG | HEART RATE: 61 BPM | HEIGHT: 66 IN | RESPIRATION RATE: 16 BRPM

## 2018-01-04 DIAGNOSIS — M19.011 OSTEOARTHRITIS OF RIGHT AC (ACROMIOCLAVICULAR) JOINT: Primary | ICD-10-CM

## 2018-01-04 PROBLEM — F33.9 RECURRENT DEPRESSION (HCC): Status: ACTIVE | Noted: 2018-01-04

## 2018-01-04 RX ORDER — TRIAMCINOLONE ACETONIDE 40 MG/ML
40 INJECTION, SUSPENSION INTRA-ARTICULAR; INTRAMUSCULAR ONCE
Qty: 1 ML | Refills: 0
Start: 2018-01-04 | End: 2018-01-04

## 2018-01-04 NOTE — PROGRESS NOTES
Chief Complaint   Patient presents with    Shoulder Pain     he is a 52y.o. year old male who presents for follow up of injury. Follow Up Pain Assessment Encounter      Onset of Symptoms: Several Months  ________________________________________________________________________  Description: Pain is unchanged      Pain Scale:(1-10): 2  Duration:  continuous  What makes it better?: rest  What makes it worse?:use  Medications tried: ibuprofen  Modalities tried: PT        Reviewed and agree with Nurse Note and duplicated in this note. Reviewed PmHx, RxHx, FmHx, SocHx, AllgHx and updated and dated in the chart. Family History   Problem Relation Age of Onset    Hypertension Mother     Arthritis-osteo Mother     High Cholesterol Mother     Cancer Father      melanoma, ?lung       Past Medical History:   Diagnosis Date    Anxiety state, unspecified     Basal cell carcinoma     Calculus of kidney 8/17/2014    Left 6mm. Left 1 mm. Dr. Ashton Fairly. 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 Dr. Alex Gonzales.  Other and unspecified hyperlipidemia     Shin splints 04/24/08    Dr. Miles Duron. Gastoc Equines    Shoulder pain 05/16/01    Right. Tendinitis. Dr. Nadeem Obando    Tinnitus 2007    Right.  Dr. Luis Manuel Carlin      Social History     Social History    Marital status: SINGLE     Spouse name: N/A    Number of children: N/A    Years of education: N/A     Social History Main Topics    Smoking status: Current Every Day Smoker     Packs/day: 0.50     Years: 24.00     Types: Cigarettes    Smokeless tobacco: Never Used      Comment: Vapor    Alcohol use 3.0 - 5.0 oz/week     6 - 10 Cans of beer per week    Drug use: No    Sexual activity: Yes     Partners: Female     Other Topics Concern    None     Social History Narrative        Review of Systems - negative except as listed above      Objective:     Vitals:    01/04/18 0850   BP: 120/69   Pulse: 61   Resp: 16   Temp: 98.3 °F (36.8 °C)   TempSrc: Oral   SpO2: 98%   Weight: 197 lb 8 oz (89.6 kg)   Height: 5' 6\" (1.676 m)       Physical Examination: General appearance - alert, well appearing, and in no distress  Back exam - full range of motion, no tenderness, palpable spasm or pain on motion  Neurological - alert, oriented, normal speech, no focal findings or movement disorder noted  Musculoskeletal -  A left elbow exam was performed. The patient is Left hand dominant. ELBOW EXAM performed   SWELLING: none  EFFUSION: none  TENDERNESS: mild at lateral epicondyle     A left ankle exam was performed. Swelling: none  Tenderness: none  Strength: normal    Palpation:  ATFL:  non-tender  CFL:  non-tender  PTFL:  non-tender  Syndesmosis Ligament:  non-tender  Deltoid ligament:  non-tender  Posterior Tibialis Tendon:  non-tender  Peroneal Tendon: tender  Achilles Tendon:  non-tender     Proximal Fibula:  non-tender  Proximal Tibia:  non-tender  Distal Fibula:  non-tender  Distal Tibia:  non-tender    Plantar Fascia: non-tender  Midfoot:  non-tender  Forefoot:  non-tender    ROM:  Plantar Flexion:  normal  Dorsiflexion:  normal    Squeeze Test: negative  Talar Tilt Test:  negative  Anterior Drawer:negative    Kleiger Test:  negative  Hop Test: negative  [place]: negative    The right shoulder is  normal to inspection. The patient has full range of motion  . The shoulderis not tender to palpation . Bicep tendon: non-tender  The NEER test is positive. The Church test:is positive   The Cross over test:is  negative. The Empty Can test:is  negative. Stressed ext rotation is:  negative. Stressed int rotation: negative. The Apprehension Sign is negative. The Lift off test is:  negative   Examination reveals the Painful Arch:  negative. The Labral Test is:  negative. The Sulcus Sign is:  negative.     Extremities - peripheral pulses normal, no pedal edema, no clubbing or cyanosis  Skin - normal coloration and turgor, no rashes, no suspicious skin lesions noted  Time Out taken at:  9:16 AM  1/4/2018    * Patient was identified by name and date of birth   * Agreement on procedure being performed was verified  * Risks and Benefits explained to the patient  * Procedure site verified and marked as necessary  * Patient was positioned for comfort  * Consent was signed and verified   In the presence of: Witness: Juju Johnston LPN  Injection #: 1  Needle:  22 gauge  Procedure: This procedure was discussed with uRbia Santos and other therapeutic options were considered (risks vs benefits). Rubia Santos and I thought that an injection was merited. After informed consent was obtained, landmarks were identified(marked), and the right joint  was cleansed with ChlorPrep in the standard sterile manner. .5 mL  1% lidocaine  and  .5 mL Kenalog  was then injected and needle tenotomy was not performed. Procedure performed with ultrasound needle guidance. The needle was then withdrawn. T he procedure was well tolerated. The patient is asked to continue to rest the area for a few more days before resuming regular activities. It may be more painful for the first 1-2 days. NSAIDS are to be avoided. Watch for fever, or increased swelling or persistent pain in the joint. Call or return to clinic prn if such symptoms occur or there is failure to improve as anticipated. The procedure did provide relief of symptoms in the clinic. RTC in 4 weeks for reevaluation and possible reinjection. Given the patient's body habitus and the anatomically deep nature of this structure, sonographic guidance is recommended to prevent injury to neurovascular structures and confirm accuracy of injection. Furthermore, this patient has failed conservative treatment with physical therapy and modalities and the diagnostic and therapeutic accuracy is important. Assessment/ Plan:   Diagnoses and all orders for this visit:    1.  Osteoarthritis of right AC (acromioclavicular) joint  -     TRIAMCINOLONE ACETONIDE INJ  -     triamcinolone acetonide (KENALOG) 40 mg/mL injection; 1 mL by IntraMUSCular route once for 1 dose. -     20604 - DRAIN/INJECT  SMALL JOINT/BURSA WITH US       Follow-up Disposition:  Return in about 4 weeks (around 2/1/2018) for shoulder pain. Pathophysiology, recovery and rehabilitation process discussed and questions answered   Counseling for 30 Minutes of the total visit duration   Pictures and figures used as necessary   Provided reassurance   Monitor response to injection   Discussed steroid side effects of fat atrophy, hypopigmentation, steroid flare or infection   Monitor response to Physical Therapy   Recommend  lower impact activities-walking, Eliptical, Nordic Track, cycling or swimming   Follow up in 4 week(s)      1) Remember to stay active and/or exercise regularly (I suggest 30-45 minutes daily)   2) For reliable dietary information, go to www. EATRIGHT.org. You may wish to consider seeing the nutritionist at [facility] [phone], also consider the [address] I have discussed the diagnosis with the patient and the intended plan as seen in the above orders. The patient has received an after-visit summary and questions were answered concerning future plans. Medication Side Effects and Warnings were discussed with patient: yes  Patient Labs were reviewed and or requested: yes  Patient Past Records were reviewed and or requested  yes  I have discussed the diagnosis with the patient and the intended plan as seen in the above orders. The patient has received an after-visit summary and questions were answered concerning future plans. Pt agrees to call or return to clinic and/or go to closest ER with any worsening of symptoms. This may include, but not limited to increased fever (>100.4) with NSAIDS or Tylenol, increased edema, confusion, rash, worsening of presenting symptoms.

## 2018-01-04 NOTE — MR AVS SNAPSHOT
Visit Information Date & Time Provider Department Dept. Phone Encounter #  
 1/4/2018  8:45 AM [facility] Shai Guzman [address] Sports Medicine and [place] [address] [id number] Follow-up Instructions Return in about 4 weeks (around 2/1/2018) for shoulder pain. Upcoming Health Maintenance Date Due Pneumococcal 19-64 Medium Risk (1 of 1 - PPSV23) 11/12/1989 DTaP/Tdap/Td series (2 - Tdap) 11/12/1991 Allergies as of 1/4/2018  Review Complete On: 1/4/2018 By: Lara Reese LPN Severity Noted Reaction Type Reactions Cymbalta [Duloxetine]  08/30/2010    Other (comments) Erectile dysfunction Prozac [Fluoxetine]  08/30/2010    Other (comments) Decreased libido and erection Current Immunizations  Reviewed on 11/30/2010 Name Date  
 TD Vaccine 6/1/1988 Not reviewed this visit You Were Diagnosed With   
  
 Codes Comments Osteoarthritis of right AC (acromioclavicular) joint    -  Primary ICD-10-CM: M19.011 
ICD-9-CM: 715.91 Vitals BP Pulse Temp Resp Height(growth percentile) Weight(growth percentile) 120/69 (BP 1 Location: Left arm, BP Patient Position: Sitting) 61 98.3 °F (36.8 °C) (Oral) 16 5' 6\" (1.676 m) 197 lb 8 oz (89.6 kg) SpO2 BMI Smoking Status 98% 31.88 kg/m2 Current Every Day Smoker Vitals History BMI and BSA Data Body Mass Index Body Surface Area  
 31.88 kg/m 2 2.04 m 2 Preferred Pharmacy Pharmacy Name Phone [facility] [address] [place] [address] [phone] Your Updated Medication List  
  
   
This list is accurate as of: 1/4/18  9:15 AM.  Always use your most recent med list.  
  
  
  
  
 ibuprofen 200 mg tablet Commonly known as:  MOTRIN Take  by mouth.  
  
 pramoxine-hydrocortisone 1-1 % topical cream  
Commonly known as:  ANALPRAM HC Apply  to affected area three (3) times daily. Follow-up Instructions Return in about 4 weeks (around 2/1/2018) for shoulder pain. To-Do List   
 01/08/2018 10:00 AM  
  Appointment with Kathya De La Fuente PT at [address] PT ([phone])  
  
 01/10/2018 10:00 AM  
  Appointment with Kathya De La Fuente PT at [address] PT ([phone]) Introducing [facility]! Dear Seferino Beavers: 
Thank you for requesting a Eyenalyze account. Our records indicate that you already have an active Eyenalyze account. You can access your account anytime at [email or web address] Did you know that you can access your hospital and ER discharge instructions at any time in Eyenalyze? You can also review all of your test results from your hospital stay or ER visit. Additional Information If you have questions, please visit the Frequently Asked Questions section of the Eyenalyze website at [email or web address] Remember, Eyenalyze is NOT to be used for urgent needs. For medical emergencies, dial 911. Now available from your iPhone and Android! Please provide this summary of care documentation to your next provider. Your primary care clinician is listed as Chelle Dickerson. If you have any questions after today's visit, please call [phone].

## 2018-01-08 ENCOUNTER — HOSPITAL ENCOUNTER (OUTPATIENT)
Dept: PHYSICAL THERAPY | Age: 48
Discharge: HOME | End: 2018-01-08
Payer: COMMERCIAL

## 2018-01-08 PROCEDURE — 97140 MANUAL THERAPY 1/> REGIONS: CPT | Performed by: PHYSICAL THERAPIST

## 2018-01-08 PROCEDURE — 97110 THERAPEUTIC EXERCISES: CPT | Performed by: PHYSICAL THERAPIST

## 2018-01-08 NOTE — PROGRESS NOTES
PT DAILY TREATMENT NOTE 2-15    Patient Name: Tim Lemus  Date:2018  : 1970  [x]  Patient  Verified  Payor: Alicia West / Plan: Live Other / Product Type: HMO /    In time: 10:02am  Out time: 11:18am  Total Treatment Time (min): 76  Visit #: 5     Treatment Area: [facility]41  [facility]72  [facility] [place]    SUBJECTIVE  Pain Level (0-10 scale): 1.5  Any medication changes, allergies to medications, adverse drug reactions, diagnosis change, or new procedure performed?: [x] No    [] Yes (see summary sheet for update)  Subjective functional status/changes:   [] No changes reported  The patient reports he can reach a little farther after the shot, but he hasn't been doing much of the exercises. OBJECTIVE    60 min Therapeutic Exercise:  [x] See flow sheet :   Rationale: increase ROM, increase strength and improve coordination to improve the patients ability to perform daily activities. 10 min Manual Therapy:  Scapular mobs; axillary release; subscap release in sidelying, GH oscillations and distraction   Rationale: decrease pain, increase ROM, increase tissue extensibility and decrease trigger points  to improve the patients ability to perform daily activities. With   [x] TE   [] TA   [] neuro   [] other: Patient Education: [x] Review HEP    [x] Progressed/Changed HEP based on:   [x] positioning   [x] body mechanics   [] transfers   [] heat/ice application    [] other:      Other Objective/Functional Measures: Pt. Tolerated therex well today. Pain Level (0-10 scale) post treatment: 1    ASSESSMENT/Changes in Function:     The patient progressed with more dynamic and functional strengthening as tolerated.  Patient will continue to benefit from skilled PT services to modify and progress therapeutic interventions, address functional mobility deficits, address ROM deficits, address strength deficits, analyze and address soft tissue restrictions, analyze and cue movement patterns, analyze and modify body mechanics/ergonomics, assess and modify postural abnormalities and instruct in home and community integration to attain remaining goals. [x]  See Plan of Care  []  See progress note/recertification  []  See Discharge Summary         Progress towards goals / Updated goals: The patient is progressing towards goals.     PLAN  [x]  Upgrade activities as tolerated     [x]  Continue plan of care  [x]  Update interventions per flow sheet       []  Discharge due to:_  []  Other:_      Chicho Cortez, PT 1/8/2018  10:03 AM

## 2018-01-10 ENCOUNTER — HOSPITAL ENCOUNTER (OUTPATIENT)
Dept: PHYSICAL THERAPY | Age: 48
Discharge: HOME | End: 2018-01-10
Payer: COMMERCIAL

## 2018-01-10 PROCEDURE — 97140 MANUAL THERAPY 1/> REGIONS: CPT | Performed by: PHYSICAL THERAPIST

## 2018-01-10 PROCEDURE — 97110 THERAPEUTIC EXERCISES: CPT | Performed by: PHYSICAL THERAPIST

## 2018-01-10 NOTE — PROGRESS NOTES
PT DAILY TREATMENT NOTE 2-15    Patient Name: Jaden Willams  Date:1/10/2018  : 1970  [x]  Patient  Verified  Payor: Jose Bass / Plan: Ashkan Le / Product Type: HMO /    In time: 10:02am  Out time: 11:20am  Total Treatment Time (min): 78  Visit #: 6     Treatment Area: [facility]41  [facility]72  [facility]    SUBJECTIVE  Pain Level (0-10 scale): 1.5  Any medication changes, allergies to medications, adverse drug reactions, diagnosis change, or new procedure performed?: [x] No    [] Yes (see summary sheet for update)  Subjective functional status/changes:   [] No changes reported  The patient reports that he's really tired as he worked late last night and only got 2 hours of sleep. OBJECTIVE    60 min Therapeutic Exercise:  [x] See flow sheet :   Rationale: increase ROM, increase strength and improve coordination to improve the patients ability to perform daily activities. 10 min Manual Therapy:  GH oscillations/distraction; axillary release with PROM; subscap release with scapular mobs   Rationale: decrease pain, increase ROM, increase tissue extensibility and decrease trigger points  to improve the patients ability to perform daily activities. With   [x] TE   [] TA   [] neuro   [] other: Patient Education: [x] Review HEP    [x] Progressed/Changed HEP based on:   [x] positioning   [x] body mechanics   [] transfers   [] heat/ice application    [] other:      Other Objective/Functional Measures: Pt. Had minimal to no increased in pain with therex. Pain Level (0-10 scale) post treatment: 1.5    ASSESSMENT/Changes in Function:     The patient progressed with scapular strengthening therex as tolerated.  Patient will continue to benefit from skilled PT services to modify and progress therapeutic interventions, address functional mobility deficits, address ROM deficits, address strength deficits, analyze and address soft tissue restrictions, analyze and cue movement patterns, analyze and modify body mechanics/ergonomics, assess and modify postural abnormalities, address imbalance/dizziness and instruct in home and community integration to attain remaining goals. [x]  See Plan of Care  []  See progress note/recertification  []  See Discharge Summary         Progress towards goals / Updated goals: The patient is progressing towards goals.     PLAN  [x]  Upgrade activities as tolerated     [x]  Continue plan of care  [x]  Update interventions per flow sheet       []  Discharge due to:_  []  Other:_      Hortencia Gleason, PT 1/10/2018  10:09 AM

## 2018-01-17 ENCOUNTER — APPOINTMENT (OUTPATIENT)
Dept: PHYSICAL THERAPY | Age: 48
End: 2018-01-17
Payer: COMMERCIAL

## 2018-01-25 ENCOUNTER — HOSPITAL ENCOUNTER (OUTPATIENT)
Dept: PHYSICAL THERAPY | Age: 48
Discharge: HOME | End: 2018-01-25
Payer: COMMERCIAL

## 2018-01-25 PROCEDURE — 97140 MANUAL THERAPY 1/> REGIONS: CPT | Performed by: PHYSICAL THERAPIST

## 2018-01-25 PROCEDURE — 97110 THERAPEUTIC EXERCISES: CPT | Performed by: PHYSICAL THERAPIST

## 2018-01-25 NOTE — PROGRESS NOTES
PT DAILY TREATMENT NOTE 2-15    Patient Name: Carl Pineda  Date:2018  : 1970  [x]  Patient  Verified  Payor: John Tamez / Plan: Russ Carlson / Product Type: HMO /    In time: 12:30pm  Out time: 1:40pm  Total Treatment Time (min): 70  Visit #: 7     Treatment Area: [facility]41  [facility]72  [facility]    SUBJECTIVE  Pain Level (0-10 scale): 5.5  Any medication changes, allergies to medications, adverse drug reactions, diagnosis change, or new procedure performed?: [x] No    [] Yes (see summary sheet for update)  Subjective functional status/changes:   [] No changes reported  The patient reports that he's still having pain; he changed his alternator and torked a wrench and felt a jolt in his shoulder, but wasn't as bad as it used to be. He started seeing an acupuncturist but has only gone twice. He still has discomfort reaching across his body, but it's not as bad as it was before the shot. He's using a foam roller and a massage ball at home that helps short term. OBJECTIVE    55 min Therapeutic Exercise:  [x] See flow sheet :   Rationale: increase ROM, increase strength and improve coordination to improve the patients ability to perform daily activities. 15 min Manual Therapy:  Scapular mobs; subscap release; axillary release   Rationale: decrease pain, increase ROM, increase tissue extensibility and decrease trigger points  to improve the patients ability to perform daily activities. With   [x] TE   [] TA   [] neuro   [] other: Patient Education: [x] Review HEP    [x] Progressed/Changed HEP based on:   [x] positioning   [x] body mechanics   [] transfers   [] heat/ice application    [] other:      Other Objective/Functional Measures: Pt. Had pain with rope chop     Pain Level (0-10 scale) post treatment: 3.5    ASSESSMENT/Changes in Function:     The patient is progressing with more dynamic therex, but continues to have pain consistent with impingement symptoms.  Patient will continue to benefit from skilled PT services to modify and progress therapeutic interventions, address functional mobility deficits, address ROM deficits, address strength deficits, analyze and address soft tissue restrictions, analyze and cue movement patterns, analyze and modify body mechanics/ergonomics, assess and modify postural abnormalities, address imbalance/dizziness and instruct in home and community integration to attain remaining goals. [x]  See Plan of Care  []  See progress note/recertification  []  See Discharge Summary         Progress towards goals / Updated goals: The patient is progressing slowly towards goals.     PLAN  [x]  Upgrade activities as tolerated     [x]  Continue plan of care  [x]  Update interventions per flow sheet       []  Discharge due to:_  []  Other:_      Jeffory Soulier, PT 1/25/2018  2:49 PM

## 2018-01-31 ENCOUNTER — HOSPITAL ENCOUNTER (OUTPATIENT)
Dept: PHYSICAL THERAPY | Age: 48
Discharge: HOME | End: 2018-01-31
Payer: COMMERCIAL

## 2018-01-31 PROCEDURE — 97110 THERAPEUTIC EXERCISES: CPT | Performed by: PHYSICAL THERAPIST

## 2018-01-31 PROCEDURE — 97140 MANUAL THERAPY 1/> REGIONS: CPT | Performed by: PHYSICAL THERAPIST

## 2018-01-31 NOTE — PROGRESS NOTES
[facility]  [address] ([address]), [address]  [place]  Phone: [phone] Fax: [phone]    Discharge Summary  2-15    Patient name: Jaden Willams  : 1970  Provider#: [id number]  Referral source: Nasim Seals DO; Truong Kelly MD      Medical/Treatment Diagnosis: D92.81  D95.03  m77.12     Prior Hospitalization: see medical history     Comorbidities: See Plan of Care  Prior Level of Function:See Plan of Care  Medications: Verified on Patient Summary List    Start of Care: 17      Onset Date: chronic   Visits from Start of Care: 8     Missed Visits: 2  Reporting Period : 17 to 18      ASSESSMENT/SUMMARY OF CARE: The patient has progressed with overall strength and stability, along with improved tolerance to reaching across his body (especially after a cortisone injection weeks ago). However, he has plateaued with progression in therapy overall. He continues to have improved but continued scapular dyskinesia with slight increase in winging on the right versus left. His underlying cervical DDD seems to be causing tingling/numbness along with spasms and decreased strength in the C6/C7 myotomes, which also will alter his scapulothoracic dynamics. He continues to have positive impingement tests [facility], with noticeable crepitus with resistance at shoulder height. If he has any local shoulder soft tissue derangement, it may be labral in nature as he had increased discomfort with a crank test/compression with rotation. He is independent with an advanced HEP that emphasizes rotator cuff and scapular strengthening/stability exercises. He may benefit from further imaging if his symptoms continue to significantly limit his functional mobility.      Short Term Goals: To be accomplished in 4 treatments:                         3.) Patient will demonstrate independence with HEP consistently- MET  Long Term Goals: To be accomplished in 16 treatments:                         6.) The patient will be able lift and carry 100 lbs of weight with proper form and technique without increase in baseline symptoms in order to simulate gernee carrying.- Infrequently MET (per patient subjective from work activities)  [phone].) The patient will report at most 2/10 pain while crossing arm over to touch left shoulder in order to tolerate activities like drying off. 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 - Occasionally MET        RECOMMENDATIONS:  [x]Discontinue therapy: [x]Patient has reached or is progressing toward set goals      []Patient is non-compliant or has abdicated      [x]Due to lack of appreciable progress towards set goals      []Sergio Edmonds, PT 1/31/2018 1:04 PM

## 2018-01-31 NOTE — PROGRESS NOTES
PT DAILY TREATMENT NOTE 2-15    Patient Name: Jaden Willams  Date:2018  : 1970  [x]  Patient  Verified  Payor: Jose Bass / Plan: Ashkan Le / Product Type: HMO /    In time:1:00pm  Out time:2:00pm  Total Treatment Time (min): 60  Visit #: 8     Treatment Area: [facility]  m.72  m.12    SUBJECTIVE  Pain Level (0-10 scale): 2.5  Any medication changes, allergies to medications, adverse drug reactions, diagnosis change, or new procedure performed?: [x] No    [] Yes (see summary sheet for update)  Subjective functional status/changes:   [] No changes reported  The patient reports that he's doing okay. He still has episodes of discomfort, some stemming from his neck and some more local in the shoulder. He is able to reach across his body better, especially after the cortisone injection a few weeks ago. OBJECTIVE    50 min Therapeutic Exercise:  [x] See flow sheet :   Rationale: increase ROM, increase strength and improve coordination to improve the patients ability to perform daily activities. 10 min Manual Therapy:  Scapular mobs; subscap release; PROM in all planes; [address] oscillations and distraction   Rationale: decrease pain, increase ROM, increase tissue extensibility and decrease trigger points  to improve the patients ability to perform daily activities. With   [x] TE   [] TA   [] neuro   [] other: Patient Education: [x] Review HEP    [x] Progressed/Changed HEP based on:   [x] positioning   [x] body mechanics   [] transfers   [] heat/ice application    [] other:      Other Objective/Functional Measures: FOTO=75 (65 at eval); Shoulder PROM: IR= 75 (65 at eval)     Pain Level (0-10 scale) post treatment: 2    ASSESSMENT/Changes in Function:     The patient has improved but continued scapular dyskinesia with slight increase in winging on the right versus left side. Patient has progressed and MET some goals but has plateaued and will be discharged with his advanced HEP.      []  See Plan of Care  []  See progress note/recertification  [x]  See Discharge Summary         Progress towards goals / Updated goals: The patient has MET some goals but has plateaued and will be discharged. PLAN  []  Upgrade activities as tolerated     []  Continue plan of care  []  Update interventions per flow sheet       [x]  Discharge due to: Pt. Has MET some goals and will be discharged.   []  Other:_      Tracy Suarez, PT 1/31/2018  1:03 PM

## 2018-02-02 ENCOUNTER — OFFICE VISIT (OUTPATIENT)
Dept: INTERNAL MEDICINE CLINIC | Age: 48
End: 2018-02-02

## 2018-02-02 VITALS
HEART RATE: 66 BPM | DIASTOLIC BLOOD PRESSURE: 77 MMHG | TEMPERATURE: 98.5 F | OXYGEN SATURATION: 99 % | RESPIRATION RATE: 16 BRPM | BODY MASS INDEX: 31.66 KG/M2 | HEIGHT: 66 IN | SYSTOLIC BLOOD PRESSURE: 120 MMHG | WEIGHT: 197 LBS

## 2018-02-02 DIAGNOSIS — M25.511 CHRONIC RIGHT SHOULDER PAIN: ICD-10-CM

## 2018-02-02 DIAGNOSIS — G89.29 CHRONIC RIGHT SHOULDER PAIN: ICD-10-CM

## 2018-02-02 DIAGNOSIS — M54.2 NECK PAIN: Primary | ICD-10-CM

## 2018-02-02 NOTE — MR AVS SNAPSHOT
[address] [place] [address] 
[phone] Patient: Taiwo Garza MRN: [id number] IAO:[id number] Visit Information Date & Time Provider Department Dept. Phone Encounter #  
 2/2/2018  8:45 AM Earnest Ventura MD [facility] Sports Medicine and [address] [id number] Upcoming Health Maintenance Date Due Pneumococcal 19-64 Medium Risk (1 of 1 - PPSV23) 11/12/1989 DTaP/Tdap/Td series (2 - Tdap) 11/12/1991 Allergies as of 2/2/2018  Review Complete On: 2/2/2018 By: Earnest Ventura MD  
  
 Severity Noted Reaction Type Reactions Cymbalta [Duloxetine]  08/30/2010    Other (comments) Erectile dysfunction Prozac [Fluoxetine]  08/30/2010    Other (comments) Decreased libido and erection Current Immunizations  Reviewed on 11/30/2010 Name Date  
 TD Vaccine 6/1/1988 Not reviewed this visit You Were Diagnosed With   
  
 Codes Comments Neck pain    -  Primary ICD-10-CM: M54.2 ICD-9-CM: 723.1 Chronic right shoulder pain     ICD-10-CM: M25.511, G89.29 ICD-9-CM: 719.41, 338.29 Vitals BP Pulse Temp Resp Height(growth percentile) Weight(growth percentile) 120/77 (BP 1 Location: Left arm, BP Patient Position: Sitting) 66 98.5 °F (36.9 °C) (Oral) 16 5' 6\" (1.676 m) 197 lb (89.4 kg) SpO2 BMI Smoking Status 99% 31.8 kg/m2 Current Every Day Smoker Vitals History BMI and BSA Data Body Mass Index Body Surface Area  
 31.8 kg/m 2 2.04 m 2 Preferred Pharmacy Pharmacy Name Phone [facility] [address] [place] [address] Niharika Yunier [phone] Your Updated Medication List  
  
   
This list is accurate as of: 2/2/18  9:27 AM.  Always use your most recent med list.  
  
  
  
  
 ibuprofen 200 mg tablet Commonly known as:  MOTRIN Take  by mouth.  
  
 pramoxine-hydrocortisone 1-1 % topical cream  
Commonly known as:  ANALPRAM HC  
 Apply  to affected area three (3) times daily. To-Do List   
 02/02/2018 Imaging:  MRI CERV SPINE WO CONT   
  
 02/02/2018 Imaging:  MRI SHOULDER RT W CONT   
  
 02/02/2018 Imaging:  XR ARTHRO SHOULDER RT   
  
 02/02/2018 Imaging:  XR SPINE CERV 4 OR 5 V Introducing [facility] & HEALTH SERVICES! Dear Drea Patten: 
Thank you for requesting a Rudder account. Our records indicate that you already have an active Rudder account. You can access your account anytime at [email or web address] Did you know that you can access your hospital and ER discharge instructions at any time in Rudder? You can also review all of your test results from your hospital stay or ER visit. Additional Information If you have questions, please visit the Frequently Asked Questions section of the Rudder website at [email or web address] Remember, Rudder is NOT to be used for urgent needs. For medical emergencies, dial 911. Now available from your iPhone and Android! Please provide this summary of care documentation to your next provider. Your primary care clinician is listed as Kristan Silva. If you have any questions after today's visit, please call [phone].

## 2018-02-02 NOTE — PROGRESS NOTES
Chief Complaint   Patient presents with    Shoulder Pain     he is a 52y.o. year old male who presents for follow up of injury. Follow Up Pain Assessment Encounter      Onset of Symptoms: Several Months  ________________________________________________________________________  Description: Pain unchanged with 2 months of physical therapy. PT states that patient has plateaued      Pain ADJVL:(0-56): 0  Duration:  continuous  What makes it better?: rest  What makes it worse?:movement  Medications tried: None  Modalities tried: PT ×2 months        Reviewed and agree with Nurse Note and duplicated in this note. Reviewed PmHx, RxHx, FmHx, SocHx, AllgHx and updated and dated in the chart. Family History   Problem Relation Age of Onset    Hypertension Mother     Arthritis-osteo Mother     High Cholesterol Mother     Cancer Father      melanoma, ?lung       Past Medical History:   Diagnosis Date    Anxiety state, unspecified     Basal cell carcinoma     Calculus of kidney 8/17/2014    Left 6mm. Left 1 mm. Dr. Carolann Reid. 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 Dr. Lonie Burkitt.  Other and unspecified hyperlipidemia     Shin splints 04/24/08    Dr. Shira Clakr. Gastoc Equines    Shoulder pain 05/16/01    Right. Tendinitis. Dr. Rubio Schwarz    Tinnitus 2007    Right.  Dr. Hernandez Doing      Social History     Social History    Marital status: SINGLE     Spouse name: N/A    Number of children: N/A    Years of education: N/A     Social History Main Topics    Smoking status: Current Every Day Smoker     Packs/day: 0.50     Years: 24.00     Types: Cigarettes    Smokeless tobacco: Never Used      Comment: Vapor    Alcohol use 3.0 - 5.0 oz/week     6 - 10 Cans of beer per week    Drug use: No    Sexual activity: Yes     Partners: Female     Other Topics Concern    None     Social History Narrative        Review of Systems - negative except as listed above      Objective:     Vitals:    02/02/18 0856   BP: 120/77   Pulse: 66   Resp: 16   Temp: 98.5 °F (36.9 °C)   TempSrc: Oral   SpO2: 99%   Weight: 197 lb (89.4 kg)   Height: 5' 6\" (1.676 m)       Physical Examination: General appearance - alert, well appearing, and in no distress  Back exam - full range of motion, no tenderness, palpable spasm or pain on motion  Neurological - alert, oriented, normal speech, no focal findings or movement disorder noted  Musculoskeletal -   The right shoulder is  normal to inspection with scapular dyskinesis apparent  The patient has full range of motion  . The shoulderis not tender to palpation . Bicep tendon: non-tender  The NEER test is positive. The Church test:is positive   The Cross over test:is  negative. The Empty Can test:is  negative. Stressed ext rotation is:  negative. Stressed int rotation: negative. The Apprehension Sign is negative. The Lift off test is:  negative   Examination reveals the Painful Arch:  negative. The Labral Test is:  negative. The Sulcus Sign is:  Negative. Neck -negative Spurling's to the left and right, pain cause with deviation to the right locally, slight tenderness with spinous process palpation  Extremities - peripheral pulses normal, no pedal edema, no clubbing or cyanosis  Skin - normal coloration and turgor, no rashes, no suspicious skin lesions noted    Assessment/ Plan:   Diagnoses and all orders for this visit:    1. Neck pain  -     XR SPINE CERV 4 OR 5 V; Future  -     MRI CERV SPINE WO CONT; Future    2.  Chronic right shoulder pain  -     MRI SHOULDER RT W CONT; Future  -     XR ARTHRO SHOULDER RT; Future    MRI of right shoulder to rule out labral tear, MRI of neck for nerve impingement - failure of conservative measures    Pathophysiology, recovery and rehabilitation process discussed and questions answered   Counseling for 30 Minutes of the total visit duration   Pictures and figures used as necessary Provided reassurance   Phone f/u for MRI results  Recommend  lower impact activities-walking, Eliptical, Nordic Track, cycling or swimming   Follow up in 4 week(s)          1) Remember to stay active and/or exercise regularly (I suggest 30-45 minutes daily)   2) For reliable dietary information, go to www. EATRIGHT.org. You may wish to consider seeing the nutritionist at [facility] [phone], also consider the [address] I have discussed the diagnosis with the patient and the intended plan as seen in the above orders. The patient has received an after-visit summary and questions were answered concerning future plans. Medication Side Effects and Warnings were discussed with patient: yes  Patient Labs were reviewed and or requested: yes  Patient Past Records were reviewed and or requested  yes  I have discussed the diagnosis with the patient and the intended plan as seen in the above orders. The patient has received an after-visit summary and questions were answered concerning future plans. Pt agrees to call or return to clinic and/or go to closest ER with any worsening of symptoms. This may include, but not limited to increased fever (>100.4) with NSAIDS or Tylenol, increased edema, confusion, rash, worsening of presenting symptoms.

## 2018-02-22 ENCOUNTER — HOSPITAL ENCOUNTER (OUTPATIENT)
Dept: MRI IMAGING | Age: 48
Discharge: HOME | End: 2018-02-22
Attending: FAMILY MEDICINE
Payer: COMMERCIAL

## 2018-02-22 ENCOUNTER — HOSPITAL ENCOUNTER (OUTPATIENT)
Dept: GENERAL RADIOLOGY | Age: 48
Discharge: HOME | End: 2018-02-22
Attending: FAMILY MEDICINE
Payer: COMMERCIAL

## 2018-02-22 DIAGNOSIS — G89.29 CHRONIC RIGHT SHOULDER PAIN: ICD-10-CM

## 2018-02-22 DIAGNOSIS — M54.2 NECK PAIN: ICD-10-CM

## 2018-02-22 DIAGNOSIS — M25.511 CHRONIC RIGHT SHOULDER PAIN: ICD-10-CM

## 2018-02-22 PROCEDURE — 72141 MRI NECK SPINE W/O DYE: CPT

## 2018-02-22 PROCEDURE — 74011636320 HC RX REV CODE- 636/320: Performed by: RADIOLOGY

## 2018-02-22 PROCEDURE — 73222 MRI JOINT UPR EXTREM W/DYE: CPT

## 2018-02-22 PROCEDURE — 74011000250 HC RX REV CODE- 250: Performed by: RADIOLOGY

## 2018-02-22 PROCEDURE — 74011250636 HC RX REV CODE- 250/636: Performed by: RADIOLOGY

## 2018-02-22 PROCEDURE — 77030014113 XR ARTHRO SHOULDER RT

## 2018-02-22 PROCEDURE — A9585 GADOBUTROL INJECTION: HCPCS | Performed by: RADIOLOGY

## 2018-02-22 RX ORDER — LIDOCAINE HYDROCHLORIDE 10 MG/ML
1 INJECTION, SOLUTION EPIDURAL; INFILTRATION; INTRACAUDAL; PERINEURAL
Status: COMPLETED | OUTPATIENT
Start: 2018-02-22 | End: 2018-02-22

## 2018-02-22 RX ADMIN — GADOBUTROL 0.4 ML: 604.72 INJECTION INTRAVENOUS at 12:04

## 2018-02-22 RX ADMIN — IOHEXOL 10 ML: 300 INJECTION, SOLUTION INTRAVENOUS at 12:05

## 2018-02-22 RX ADMIN — LIDOCAINE HYDROCHLORIDE 1 ML: 10 INJECTION, SOLUTION EPIDURAL; INFILTRATION; INTRACAUDAL; PERINEURAL at 15:00

## 2018-03-12 ENCOUNTER — OFFICE VISIT (OUTPATIENT)
Dept: INTERNAL MEDICINE CLINIC | Age: 48
End: 2018-03-12

## 2018-03-12 VITALS
RESPIRATION RATE: 17 BRPM | WEIGHT: 200 LBS | DIASTOLIC BLOOD PRESSURE: 84 MMHG | BODY MASS INDEX: 32.14 KG/M2 | TEMPERATURE: 98.6 F | HEART RATE: 87 BPM | HEIGHT: 66 IN | OXYGEN SATURATION: 98 % | SYSTOLIC BLOOD PRESSURE: 136 MMHG

## 2018-03-12 DIAGNOSIS — M75.111 INCOMPLETE TEAR OF RIGHT ROTATOR CUFF: ICD-10-CM

## 2018-03-12 DIAGNOSIS — M50.30 DDD (DEGENERATIVE DISC DISEASE), CERVICAL: Primary | ICD-10-CM

## 2018-03-12 NOTE — MR AVS SNAPSHOT
[address] [place] [address]. [address] [id number] 
[phone] Patient: Golda Lanes MRN: [id number] Visit Information Date & Time Provider Department Dept. Phone Encounter #  
 3/12/2018  2:15 PM Art Knox MD [facility] and [place] [address] [id number] Follow-up Instructions Return if symptoms worsen or fail to improve. Follow-up and Disposition History Upcoming Health Maintenance Date Due Pneumococcal 19-64 Medium Risk (1 of 1 - PPSV23) 11/12/1989 DTaP/Tdap/Td series (2 - Tdap) 11/12/1991 Allergies as of 3/12/2018  Review Complete On: 3/12/2018 By: Art Knox MD  
  
 Severity Noted Reaction Type Reactions Cymbalta [Duloxetine]  08/30/2010    Other (comments) Erectile dysfunction Prozac [Fluoxetine]  08/30/2010    Other (comments) Decreased libido and erection Current Immunizations  Reviewed on 11/30/2010 Name Date  
 TD Vaccine 6/1/1988 Not reviewed this visit You Were Diagnosed With   
  
 Codes Comments DDD (degenerative disc disease), cervical    -  Primary ICD-10-CM: M50.30 ICD-9-CM: 722.4 Incomplete tear of right rotator cuff     ICD-10-CM: M75.111 ICD-9-CM: 840.4 Vitals BP Pulse Temp Resp Height(growth percentile) Weight(growth percentile) 136/84 (BP 1 Location: Left arm, BP Patient Position: Sitting) 87 98.6 °F (37 °C) (Oral) 17 5' 6\" (1.676 m) 200 lb (90.7 kg) SpO2 BMI Smoking Status 98% 32.28 kg/m2 Current Every Day Smoker Vitals History BMI and BSA Data Body Mass Index Body Surface Area  
 32.28 kg/m 2 2.06 m 2 Preferred Pharmacy Pharmacy Name Phone [facility] [address] [place] [address] Friday [phone] Your Updated Medication List  
  
   
This list is accurate as of 3/12/18  3:14 PM.  Always use your most recent med list.  
  
  
  
  
 ibuprofen 200 mg tablet Commonly known as:  MOTRIN Take  by mouth.  
  
 pramoxine-hydrocortisone 1-1 % topical cream  
Commonly known as:  ANALPRAM HC Apply  to affected area three (3) times daily. We Performed the Following REFERRAL TO ORTHOPEDICS [VVA090 Custom] Follow-up Instructions Return if symptoms worsen or fail to improve. Referral Information Referral ID Referred By Referred To  
  
 [id number] [facility]   
   [address] [place], [address] Visits Status Start Date End Date 1 New Request 3/12/18 3/12/19 If your referral has a status of pending review or denied, additional information will be sent to support the outcome of this decision. Introducing [facility] & HEALTH SERVICES! Dear Scooby Bae: 
Thank you for requesting a [employer] account. Our records indicate that you already have an active [employer] account. You can access your account anytime at https://[employer]. [employer] Did you know that you can access your hospital and ER discharge instructions at any time in [employer]? You can also review all of your test results from your hospital stay or ER visit. Additional Information If you have questions, please visit the Frequently Asked Questions section of the [employer] website at https://[employer]. [employer]/. Remember, [employer] is NOT to be used for urgent needs. For medical emergencies, dial 911. Now available from your iPhone and Android! Please provide this summary of care documentation to your next provider. Your primary care clinician is listed as Parker Vizcarra. If you have any questions after today's visit, please call [phone].

## 2018-03-12 NOTE — PROGRESS NOTES
Chief Complaint   Patient presents with    Shoulder Pain    Neck Pain     he is a 52y.o. year old male who presents for follow up of injury. Follow Up Pain Assessment Encounter      Onset of Symptoms: Several Months  ________________________________________________________________________  Description: Pain is has improved      Pain Scale:(1-10): 3  Duration:  continuous  What makes it better?: rest  What makes it worse?:use  Medications tried: None  Modalities tried: PT        Reviewed and agree with Nurse Note and duplicated in this note. Reviewed PmHx, RxHx, FmHx, SocHx, AllgHx and updated and dated in the chart. Family History   Problem Relation Age of Onset    Hypertension Mother     Arthritis-osteo Mother     High Cholesterol Mother     Cancer Father      melanoma, ?lung       Past Medical History:   Diagnosis Date    Anxiety state, unspecified     Basal cell carcinoma     Calculus of kidney 8/17/2014    Left 6mm. Left 1 mm. Dr. Gisselle Nava. 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 Dr. Blair Arzola.  Other and unspecified hyperlipidemia     Shin splints 04/24/08    Dr. Juan Lemus. Gastoc Equines    Shoulder pain 05/16/01    Right. Tendinitis. Dr. Nilson Rao    Tinnitus 2007    Right.  Dr. Ene Haile      Social History     Social History    Marital status: SINGLE     Spouse name: N/A    Number of children: N/A    Years of education: N/A     Social History Main Topics    Smoking status: Current Every Day Smoker     Packs/day: 0.50     Years: 24.00     Types: Cigarettes    Smokeless tobacco: Never Used      Comment: Vapor    Alcohol use 3.0 - 5.0 oz/week     6 - 10 Cans of beer per week    Drug use: No    Sexual activity: Yes     Partners: Female     Other Topics Concern    None     Social History Narrative        Review of Systems - negative except as listed above      Objective:     Vitals:    03/12/18 1426   BP: 136/84   Pulse: 87   Resp: 17 Temp: 98.6 °F (37 °C)   TempSrc: Oral   SpO2: 98%   Weight: 200 lb (90.7 kg)   Height: 5' 6\" (1.676 m)       Physical Examination: General appearance - alert, well appearing, and in no distress  Back exam - full range of motion, no tenderness, palpable spasm or pain on motion  Neurological - alert, oriented, normal speech, no focal findings or movement disorder noted  Musculoskeletal   The right shoulder is  normal to inspection with scapular dyskinesis apparent  The patient has full range of motion  . The shoulderis not tender to palpation . Bicep tendon: non-tender  The NEER test is positive. The Church test:is positive   The Cross over test:is  negative. The Empty Can test:is  negative. Stressed ext rotation is:  negative. Stressed int rotation: negative. The Apprehension Sign is negative. The Lift off test is:  negative   Examination reveals the Painful Arch:  negative. The Labral Test is:  negative. The Sulcus Sign is:  Negative. Neck -negative Spurling's to the left and right, pain cause with deviation to the right locally, slight tenderness with spinous process palpation  Extremities - peripheral pulses normal, no pedal edema, no clubbing or cyanosis  Skin - normal coloration and turgor, no rashes, no suspicious skin lesions noted    Assessment/ Plan:   Diagnoses and all orders for this visit:    1. DDD (degenerative disc disease), cervical  -     REFERRAL TO ORTHOPEDICS    2.  Incomplete tear of right rotator cuff  Recommend physical therapy for his incomplete rotator cuff tear  Consider epidural steroid injection for his C-spine, will let orthopedics decide  Follow-up Disposition: Not on File    Pathophysiology, recovery and rehabilitation process discussed and questions answered   Counseling for 30 Minutes of the total visit duration   Pictures and figures used as necessary   Provided reassurance   Monitor response to Physical Therapy         1) Remember to stay active and/or exercise regularly (I suggest 30-45 minutes daily)   2) For reliable dietary information, go to www. EATRIGHT.org. You may wish to consider seeing the nutritionist at [facility] [phone], also consider the [address] I have discussed the diagnosis with the patient and the intended plan as seen in the above orders. The patient has received an after-visit summary and questions were answered concerning future plans. Medication Side Effects and Warnings were discussed with patient: yes  Patient Labs were reviewed and or requested: yes  Patient Past Records were reviewed and or requested  yes  I have discussed the diagnosis with the patient and the intended plan as seen in the above orders. The patient has received an after-visit summary and questions were answered concerning future plans. Pt agrees to call or return to clinic and/or go to closest ER with any worsening of symptoms. This may include, but not limited to increased fever (>100.4) with NSAIDS or Tylenol, increased edema, confusion, rash, worsening of presenting symptoms.

## 2018-08-16 ENCOUNTER — OFFICE VISIT (OUTPATIENT)
Dept: INTERNAL MEDICINE CLINIC | Age: 48
End: 2018-08-16

## 2018-08-16 VITALS
DIASTOLIC BLOOD PRESSURE: 76 MMHG | WEIGHT: 192.2 LBS | OXYGEN SATURATION: 96 % | SYSTOLIC BLOOD PRESSURE: 121 MMHG | BODY MASS INDEX: 30.89 KG/M2 | TEMPERATURE: 97.6 F | HEIGHT: 66 IN | HEART RATE: 50 BPM | RESPIRATION RATE: 16 BRPM

## 2018-08-16 DIAGNOSIS — E78.00 ELEVATED CHOLESTEROL: ICD-10-CM

## 2018-08-16 DIAGNOSIS — R53.83 FATIGUE, UNSPECIFIED TYPE: Primary | ICD-10-CM

## 2018-08-16 DIAGNOSIS — F33.9 RECURRENT DEPRESSION (HCC): ICD-10-CM

## 2018-08-16 DIAGNOSIS — Z23 ENCOUNTER FOR IMMUNIZATION: ICD-10-CM

## 2018-08-16 NOTE — ASSESSMENT & PLAN NOTE
Stable, based on history, physical exam and review of pertinent labs, studies and medications; meds reconciled; continue current treatment plan. Key Psychotherapeutic Meds     Patient is on no psychotherapeutic meds. Other [address] [place] [address]     The patient is on no other behavioral health meds.         No results found for: NA, NAPOC, CREA, CREAPOC, CREATEXT, TSH, WBC, WBCT, WBCPOC, GPT, ALTPOC, ALT, SGOT, ASTPOC, GGT, LITHM, ATRPA, NORTR, TSHEXT

## 2018-08-16 NOTE — PROGRESS NOTES
Chief Complaint   Patient presents with    Fatigue     he is a 52y.o. year old male who presents for evaluation of always being tired. Patient states that no matter how much sleep he has gotten he will still wake up tired like he did not sleep at all. Patient concerned about thyroid or testosterone. He has been dealing with chronic pain in his neck with neurosurgery. He believes that this could be causing some slight depression but he does not know any depressive symptoms currently. He does go to sleep around 1130 and wakes up usually around 715 with feeling of minimal rest fatigue. He is single and is not sure if he is snores at night. Also states that his recent lab work for his physical his cholesterol is elevated. Denies any shortness of breath MAN. Denies any hair loss recent weight gain palpitations or weight loss. Reviewed and agree with Nurse Note and duplicated in this note. Reviewed PmHx, RxHx, FmHx, SocHx, AllgHx and updated and dated in the chart. Family History   Problem Relation Age of Onset    Hypertension Mother     Arthritis-osteo Mother     High Cholesterol Mother     Cancer Father      melanoma, ?lung       Past Medical History:   Diagnosis Date    Anxiety state, unspecified     Basal cell carcinoma     Calculus of kidney 8/17/2014    Left 6mm. Left 1 mm. Dr. Ling Alcala. 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 Dr. Francis Pereyra.  Other and unspecified hyperlipidemia     Shin splints 04/24/08    Dr. Vineet Chang. Gastoc Equines    Shoulder pain 05/16/01    Right. Tendinitis. Dr. Laguna Juan    Tinnitus 2007    Right.  Dr. Jeanie Oconnor History     Social History    Marital status: SINGLE     Spouse name: N/A    Number of children: N/A    Years of education: N/A     Social History Main Topics    Smoking status: Current Every Day Smoker     Packs/day: 0.50     Years: 24.00     Types: Cigarettes    Smokeless tobacco: Never Used Comment: Vapor    Alcohol use 3.0 - 5.0 oz/week     6 - 10 Cans of beer per week    Drug use: No    Sexual activity: Yes     Partners: Female     Other Topics Concern    None     Social History Narrative        Review of Systems - negative except as listed above      Objective:     Vitals:    08/16/18 0836   BP: 121/76   Pulse: (!) 50   Resp: 16   Temp: 97.6 °F (36.4 °C)   TempSrc: Oral   SpO2: 96%   Weight: 192 lb 3.2 oz (87.2 kg)   Height: 5' 6\" (1.676 m)       Physical Examination: General appearance - alert, well appearing, and in no distress  Eyes - pupils equal and reactive, extraocular eye movements intact  Ears - bilateral TM's and external ear canals normal  Nose - normal and patent, no erythema, discharge or polyps  Mouth - mucous membranes moist, pharynx normal without lesions  Neck - supple, no significant adenopathy  Chest - clear to auscultation, no wheezes, rales or rhonchi, symmetric air entry  Heart - normal rate, regular rhythm, normal S1, S2, no murmurs, rubs, clicks or gallops  Abdomen - soft, nontender, nondistended, no masses or organomegaly  Musculoskeletal - no joint tenderness, deformity or swelling  Extremities - peripheral pulses normal, no pedal edema, no clubbing or cyanosis  Skin - normal coloration and turgor, no rashes, no suspicious skin lesions noted    Assessment/ Plan:   Diagnoses and all orders for this visit:    1. Fatigue, unspecified type  -     TSH 3RD GENERATION  -     TESTOSTERONE, FREE & TOTAL  -     CBC W/O DIFF    2. Elevated cholesterol  -     LIPID PANEL     Follow-up Disposition: Not on File  Patient was informed/counseled on: Body mass index is 31.02 kg/(m^2). Discussed the patient's I have reviewed/discussed the above normal BMI with the patient. I have recommended the following interventions: dietary management education, guidance, and counseling .     1) Remember to stay active and/or exercise regularly (I suggest 30-45 minutes daily)   2) For reliable dietary information, go to www. EATRIGHT.org. You may wish to consider seeing the nutritionist at [facility] [phone], also consider the [address] 3) I routinely suggest a complete physical exam once each year (your birth month)  I have discussed the diagnosis with the patient and the intended plan as seen in the above orders. The patient has received an after-visit summary and questions were answered concerning future plans. Medication Side Effects and Warnings were discussed with patient: yes  Patient Labs were reviewed and or requested: yes  Patient Past Records were reviewed and or requested  yes  I have discussed the diagnosis with the patient and the intended plan as seen in the above orders. Pt agrees to call or return to clinic and/or go to closest ER with any worsening of symptoms. This may include, but not limited to increased fever (>100.4) with NSAIDS or Tylenol, increased edema, confusion, rash, worsening of presenting symptoms.

## 2018-08-16 NOTE — MR AVS SNAPSHOT
[facility] 
 
 
 [place] [address] [address] 
[phone] Patient: Srinivas Lara MRN: [id number] Visit Information Date & Time Provider Department Dept. Phone Encounter #  
 8/16/2018  8:15 AM [address] [place] [address] MD Megan [facility] [phone] [id number] Follow-up Instructions Return in about 4 weeks (around 9/13/2018) for fatigue. Follow-up and Disposition History Your Appointments 9/13/2018  8:15 AM  
Any with [facility] MD Megan  
[address] [place] [address] and Primary Care [facility]) Appt Note: follow up 4wks for  fatigue [address] [facility]  
  
   
 [place] [address] [id number] Upcoming Health Maintenance Date Due DTaP/Tdap/Td series (2 - Tdap) 11/12/1991 Influenza Age 5 to Adult 8/1/2018 Pneumococcal 19-64 Medium Risk (1 of 1 - PPSV23) 8/16/2019* *Topic was postponed. The date shown is not the original due date. Allergies as of 8/16/2018  Review Complete On: 8/16/2018 By: [address] [facility] MD Megan  
  
 Severity Noted Reaction Type Reactions Cymbalta [Duloxetine]  08/30/2010    Other (comments) Erectile dysfunction Prozac [Fluoxetine]  08/30/2010    Other (comments) Decreased libido and erection Current Immunizations  Reviewed on 11/30/2010 Name Date  
 TD Vaccine 6/1/1988 Tdap 8/16/2018 Not reviewed this visit You Were Diagnosed With   
  
 Codes Comments Fatigue, unspecified type    -  Primary ICD-10-CM: R53.83 ICD-9-CM: 780.79 Elevated cholesterol     ICD-10-CM: E78.00 ICD-9-CM: 272.0 Recurrent depression [facility]     ICD-10-CM: F33.9 ICD-9-CM: 296.30 Encounter for immunization     ICD-10-CM: I54 ICD-9-CM: V03.89 Vitals BP Pulse Temp Resp Height(growth percentile) Weight(growth percentile) 121/76 (BP 1 Location: Right arm, BP Patient Position: Sitting) (!) 50 97.6 °F (36.4 °C) (Oral) 16 5' 6\" (1.676 m) 192 lb 3.2 oz (87.2 kg) SpO2 BMI Smoking Status 96% 31.02 kg/m2 Current Every Day Smoker Vitals History BMI and BSA Data Body Mass Index Body Surface Area 31.02 kg/m 2 2.02 m 2 Preferred Pharmacy Pharmacy Name Phone [facility] [address] [place] [phone] Your Updated Medication List  
  
   
This list is accurate as of 8/16/18  2:06 PM.  Always use your most recent med list.  
  
  
  
  
 ibuprofen 200 mg tablet Commonly known as:  MOTRIN Take  by mouth.  
  
 pramoxine-hydrocortisone 1-1 % topical cream  
Commonly known as:  ANALPRAM HC Apply  to affected area three (3) times daily. We Performed the Following CBC W/O DIFF [53660 CPT(R)] LIPID PANEL [88256 CPT(R)] VT IMMUNIZ ADMIN,1 SINGLE/COMB VAC/TOXOID [id number] CPT(R)] TESTOSTERONE, FREE & TOTAL [74658 CPT(R)] TETANUS, DIPHTHERIA TOXOIDS AND ACELLULAR PERTUSSIS VACCINE (TDAP), IN INDIVIDS. >=7, IM [id number] CPT(R)] TSH 3RD GENERATION [19428 CPT(R)] Follow-up Instructions Return in about 4 weeks (around 9/13/2018) for fatigue. Introducing [facility] & HEALTH SERVICES! Dear Mendoza Williamson: 
Thank you for requesting a [employer] account. Our records indicate that you already have an active [employer] account. You can access your account anytime at [email or web address]. [employer] Did you know that you can access your hospital and ER discharge instructions at any time in [employer]? You can also review all of your test results from your hospital stay or ER visit. Additional Information If you have questions, please visit the Frequently Asked Questions section of the [employer] website at [email or web address]. [employer] Remember, [employer] is NOT to be used for urgent needs. For medical emergencies, dial 911. Now available from your iPhone and Android! Please provide this summary of care documentation to your next provider. Your primary care clinician is listed as [place]. If you have any questions after today's visit, please call [phone].

## 2018-08-22 LAB
CHOLEST SERPL-MCNC: 221 MG/DL (ref 100–199)
ERYTHROCYTE [DISTWIDTH] IN BLOOD BY AUTOMATED COUNT: 14.2 % (ref 12.3–15.4)
HCT VFR BLD AUTO: 42.3 % (ref 37.5–51)
HDLC SERPL-MCNC: 29 MG/DL
HGB BLD-MCNC: 14.4 G/DL (ref 13–17.7)
LDLC SERPL CALC-MCNC: 166 MG/DL (ref 0–99)
MCH RBC QN AUTO: 29 PG (ref 26.6–33)
MCHC RBC AUTO-ENTMCNC: 34 G/DL (ref 31.5–35.7)
MCV RBC AUTO: 85 FL (ref 79–97)
PLATELET # BLD AUTO: 250 X10E3/UL (ref 150–379)
RBC # BLD AUTO: 4.96 X10E6/UL (ref 4.14–5.8)
TESTOST FREE SERPL-MCNC: NORMAL PG/ML
TESTOST SERPL-MCNC: 297 NG/DL (ref 264–916)
TRIGL SERPL-MCNC: 131 MG/DL (ref 0–149)
TSH SERPL DL<=0.005 MIU/L-ACNC: 1.37 UIU/ML (ref 0.45–4.5)
VLDLC SERPL CALC-MCNC: 26 MG/DL (ref 5–40)
WBC # BLD AUTO: 6.8 X10E3/UL (ref 3.4–10.8)

## 2018-08-22 NOTE — PROGRESS NOTES
Your cholesterol has gone up since last check and will likely need to start a cholesterol medication to bring this back down. If you would like me to call this into your pharmacy please respond to this message.

## 2018-09-13 ENCOUNTER — OFFICE VISIT (OUTPATIENT)
Dept: INTERNAL MEDICINE CLINIC | Age: 48
End: 2018-09-13

## 2018-09-13 ENCOUNTER — OFFICE VISIT (OUTPATIENT)
Dept: SLEEP MEDICINE | Age: 48
End: 2018-09-13

## 2018-09-13 VITALS
RESPIRATION RATE: 16 BRPM | DIASTOLIC BLOOD PRESSURE: 77 MMHG | HEIGHT: 66 IN | HEART RATE: 61 BPM | SYSTOLIC BLOOD PRESSURE: 113 MMHG | WEIGHT: 190.7 LBS | BODY MASS INDEX: 30.65 KG/M2 | OXYGEN SATURATION: 96 % | TEMPERATURE: 97.5 F

## 2018-09-13 VITALS
DIASTOLIC BLOOD PRESSURE: 71 MMHG | HEART RATE: 62 BPM | BODY MASS INDEX: 30.22 KG/M2 | WEIGHT: 188 LBS | HEIGHT: 66 IN | OXYGEN SATURATION: 97 % | SYSTOLIC BLOOD PRESSURE: 110 MMHG

## 2018-09-13 DIAGNOSIS — E34.9 HYPOTESTOSTERONEMIA: ICD-10-CM

## 2018-09-13 DIAGNOSIS — E78.00 HYPERCHOLESTEREMIA: ICD-10-CM

## 2018-09-13 DIAGNOSIS — R53.82 CHRONIC FATIGUE: ICD-10-CM

## 2018-09-13 DIAGNOSIS — Z72.821 INADEQUATE SLEEP HYGIENE: ICD-10-CM

## 2018-09-13 DIAGNOSIS — G47.33 OBSTRUCTIVE SLEEP APNEA (ADULT) (PEDIATRIC): Primary | ICD-10-CM

## 2018-09-13 DIAGNOSIS — M75.82 ROTATOR CUFF TENDONITIS, LEFT: Primary | ICD-10-CM

## 2018-09-13 RX ORDER — DICLOFENAC SODIUM 10 MG/G
GEL TOPICAL 4 TIMES DAILY
COMMUNITY
End: 2020-02-18 | Stop reason: ALTCHOICE

## 2018-09-13 NOTE — PROGRESS NOTES
[address] [place], [address]  Tel.  [phone]  Fax. [address]  [place], [address]  Tel.  [phone]  Fax. [phone] [address] Lakisha Bernal [address]  Tel.  [phone]  Fax. [phone]         Subjective:      Fly Brewer is an 52 y.o. male referred for evaluation for a sleep disorder. He complains of snoring associated with excessive daytime sleepiness. Symptoms began several years ago, gradually worsening since that time. He usually can fall asleep in 20-30 minutes. Family or firehouse members note snoring. He denies falling asleep while at work, driving. Fly Brewer does wake up frequently at night. He is bothered by waking up too early and left unable to get back to sleep. He actually sleeps about 9 hours at night and wakes up about 3 times during the night. He does not work shifts: Jose L Murray indicates he does get too little sleep at night. His bedtime is 2300. He awakens at 0530. He does take naps. He takes 5 naps a week lasting 1, Hour(s). He has the following observed behaviors: Loud snoring, Light snoring;  . Other remarks:    He is a  (10 calls a month). Also has a side business. [place] Sleepiness Score: 10   which reflect mild daytime drowsiness. Allergies   Allergen Reactions    Cymbalta [Duloxetine] Other (comments)     Erectile dysfunction      Prozac [Fluoxetine] Other (comments)     Decreased libido and erection         Current Outpatient Prescriptions:     diclofenac (VOLTAREN) 1 % gel, Apply  to affected area four (4) times daily. , Disp: , Rfl:     ibuprofen (MOTRIN) 200 mg tablet, Take  by mouth., Disp: , Rfl:     pramoxine-hydrocortisone (ANALPRAM HC) 1-1 % topical cream, Apply  to affected area three (3) times daily. , Disp: 30 g, Rfl: 0     He  has a past medical history of Anxiety state, unspecified; Basal cell carcinoma; Calculus of kidney (8/17/2014);  Depression; Neck pain (2010); Other and unspecified hyperlipidemia; Thurman splints (04/24/08); Shoulder pain (05/16/01); and Tinnitus (2007). He  has a past surgical history that includes hx refractive surgery (12/2005); nerve block (07/2010); hx skin biopsy (2012); hx tonsillectomy; hx other surgical (1996); hx wisdom teeth extraction; hx lithotripsy (08/2014); and hx urological (08/2014, 09/2014). He family history includes Arthritis-osteo in his mother; Cancer in his father; High Cholesterol in his mother; Hypertension in his mother. He  reports that he has been smoking Cigarettes. He has a 12.00 pack-year smoking history. He has never used smokeless tobacco. He reports that he drinks about 3.0 - 5.0 oz of alcohol per week  He reports that he does not use illicit drugs. Review of Systems:  Constitutional:  +weight gain. Eyes:  No blurred vision. CVS:  No significant chest pain  Pulm:  No significant shortness of breath  GI:  No significant nausea or vomiting  :  No significant nocturia  Musculoskeletal:  + (neck and shoulder) significant joint pain at night  Skin:  No significant rashes  Neuro:  No significant dizziness   Psych:  No active mood issues    Sleep Review of Systems: notable for no difficulty falling asleep; +frequent awakenings at night;  regular dreaming noted; no nightmares ; occasional early morning headaches; no memory problems; no concentration issues; no history of any automobile or occupational accidents due to daytime drowsiness. Objective:     Visit Vitals    /71    Pulse 62    Ht 5' 6\" (1.676 m)    Wt 188 lb (85.3 kg)    SpO2 97%    BMI 30.34 kg/m2         General:   Not in acute distress   Eyes:  Anicteric sclerae, no obvious strabismus   Nose:  No obvious nasal septum deviation    Oropharynx:   Class 3 oropharyngeal outlet, thick tongue base,low-lying soft palate,    Tonsils:   tonsils are absent   Neck:   Neck circ.  in \"inches\": 17.25; midline trachea   Chest/Lungs: Equal lung expansion, clear on auscultation    CVS:  Normal rate, regular rhythm; no JVD   Skin:  Warm to touch; no obvious rashes   Neuro:  No focal deficits ; no obvious tremor    Psych:  Normal affect,  normal countenance;          Assessment:       ICD-10-CM ICD-9-CM    1. Obstructive sleep apnea (adult) (pediatric) G47.33 327.23    2. Inadequate sleep hygiene Z72.821 307.49          Plan:     * The patient currently has a Moderate Risk for having sleep apnea. STOP-BANG score 4.  * PSG was ordered for initial evaluation. I have reviewed the different types of sleep studies. Attended sleep studies and home sleep apnea tests. Home sleep testing tests only for the presence and severity of sleep apnea. he understands that if the HSAT does not provide reliable result(such as poor data/failed HSAT recording), he may have to repeat the HSAT or come in for an attended polysomnogram.     * He was provided information on sleep apnea including coresponding risk factors and the importance of proper treatment. * Counseling was provided regarding proper sleep hygiene and safe driving. * Treatment options for sleep apnea were reviewed. he is not against a trial of PAP if found to have significant sleep apnea. The treatment plan was reviewed with the patient in detail and reviewed with the patient and the lead technologist. he understands that the lead technologist will be calling him  with the results and assisting with the next step in the treatment plan as outlined today during the consultation with me. All of his questions were addressed. 2. Inadequate sleep hygiene - I have advised a regular sleep wake cycle. he  was advised to avoid looking at the clock during the night. Ideally, the clock face should be turned away. he was advised to minimize caffeine use and to avoid caffeine-containing beverages 8 hours prior to bedtime. Naps were discouraged.   A regular exercise schedule, at least 3 hours before bedtime, would be beneficial to improving sleep quality. Watching TV, using laptops, tablets and smartphones in the evening was discouraged. he  was advised to keep the bedroom cool and dark. All of his questions were addressed. Thank you for allowing us to participate in your patient's medical care. We'll keep you updated on these investigations.   Electronically signed by    Haleigh Jackson MD  Diplomate in Sleep Medicine  [facility]

## 2018-09-13 NOTE — Clinical Note
Thank you for the referral.  I will keep you informed of his progress.  [address], [place]

## 2018-09-13 NOTE — PROGRESS NOTES
Chief Complaint   Patient presents with    Fatigue     he is a 52y.o. year old male who presents for follow-up of fatigue. Patient states that he is still having fatigue and is not much better. Pain Assessment Encounter      Indira Stark  9/13/2018  Onset of Symptoms: weeks  ________________________________________________________________________  Description: Left shoulder pain with reaching back    Frequency: once a day  Pain Scale:(1-10): 4  Trauma Hx: NA  Hx of similar symptoms: No:   Radiation: none  Duration:  continuous      Progression: is unchanged  What makes it better?: rest  What makes it worse?:exercise  Medications tried: NA        Reviewed and agree with Nurse Note and duplicated in this note. Reviewed PmHx, RxHx, FmHx, SocHx, AllgHx and updated and dated in the chart. Family History   Problem Relation Age of Onset    Hypertension Mother     Arthritis-osteo Mother     High Cholesterol Mother     Cancer Father      melanoma, ?lung       Past Medical History:   Diagnosis Date    Anxiety state, unspecified     Basal cell carcinoma     Calculus of kidney 8/17/2014    Left 6mm. Left 1 mm. Dr. Fabienne Philip. 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 Dr. Christian Garcia.  Other and unspecified hyperlipidemia     Shin splints 04/24/08    Dr. Elby Sandifer. Gastoc Equines    Shoulder pain 05/16/01    Right. Tendinitis. Dr. Michaela Spence    Tinnitus 2007    Right.  Dr. Ellyn James      Social History     Social History    Marital status: SINGLE     Spouse name: N/A    Number of children: N/A    Years of education: N/A     Social History Main Topics    Smoking status: Current Every Day Smoker     Packs/day: 0.50     Years: 24.00     Types: Cigarettes    Smokeless tobacco: Never Used      Comment: Vapor    Alcohol use 3.0 - 5.0 oz/week     6 - 10 Cans of beer per week    Drug use: No    Sexual activity: Yes     Partners: Female     Other Topics Concern    None     Social History Narrative        Review of Systems - negative except as listed above      Objective:     Vitals:    09/13/18 0820   BP: 113/77   Pulse: 61   Resp: 16   Temp: 97.5 °F (36.4 °C)   TempSrc: Oral   SpO2: 96%   Weight: 190 lb 11.2 oz (86.5 kg)   Height: 5' 6\" (1.676 m)       Physical Examination: General appearance - alert, well appearing, and in no distress  Eyes - pupils equal and reactive, extraocular eye movements intact  Ears - bilateral TM's and external ear canals normal  Nose - normal and patent, no erythema, discharge or polyps  Mouth - mucous membranes moist, pharynx normal without lesions  Neck - supple, no significant adenopathy  Chest - clear to auscultation, no wheezes, rales or rhonchi, symmetric air entry  Back exam - full range of motion, no tenderness, palpable spasm or pain on motion  Neurological - alert, oriented, normal speech, no focal findings or movement disorder noted  Musculoskeletal - The left shoulder is  normal to inspection. The patient has full range of motion  . The shoulderis tender to palpation laterally. Bicep tendon: non-tender  The NEER test is negative. The Church test:is positive   The Cross over test:is  negative. The Empty Can test:is  negative. Stressed ext rotation is:  negative. Stressed int rotation: negative. The Apprehension Sign is negative. The Lift off test is:  negative   Examination reveals the Painful Arch:  negative. The Labral Test is:  negative. The Sulcus Sign is:  negative. Extremities - peripheral pulses normal, no pedal edema, no clubbing or cyanosis  Skin - normal coloration and turgor, no rashes, no suspicious skin lesions noted    Assessment/ Plan:   Diagnoses and all orders for this visit:    1. Rotator cuff tendonitis, left  -     XR SHOULDER LT AP/LAT MIN 2 V; Future  Patient will look into Workmen's Comp. for physical therapy  2. Hypotestosteronemia  -     TESTOSTERONE, FREE & TOTAL    3.  Chronic fatigue  - REFERRAL TO SLEEP STUDIES    4. Hypercholesteremia  Patient like to try diet and exercise changes before starting medication     Follow-up Disposition:  Return in about 4 weeks (around 10/11/2018), or if symptoms worsen or fail to improve. Patient was informed/counseled on: Body mass index is 30.78 kg/(m^2). Discussed the patient's Patient refused height and weight today, therefore the BMI was not calculated    1) Remember to stay active and/or exercise regularly (I suggest 30-45 minutes daily)   2) For reliable dietary information, go to www. EATRIGHT.org. You may wish to consider seeing the nutritionist at [facility] [phone], also consider the [address] [facility]. 3) I routinely suggest a complete physical exam once each year (your birth month)  I have discussed the diagnosis with the patient and the intended plan as seen in the above orders. The patient has received an after-visit summary and questions were answered concerning future plans. Medication Side Effects and Warnings were discussed with patient: yes  Patient Labs were reviewed and or requested: yes  Patient Past Records were reviewed and or requested  yes  I have discussed the diagnosis with the patient and the intended plan as seen in the above orders. Pt agrees to call or return to clinic and/or go to closest ER with any worsening of symptoms. This may include, but not limited to increased fever (>100.4) with NSAIDS or Tylenol, increased edema, confusion, rash, worsening of presenting symptoms.

## 2018-09-13 NOTE — PATIENT INSTRUCTIONS
[address] [facility] [place]. [place], [address]  Tel.  [phone]  Fax. [address] [facility] [address]  [employer], [address] [facility]  Tel.  [phone]  Fax. [phone] [address]  Tel.  [phone]  Fax. [phone]     Sleep Apnea: After Your Visit  Your Care Instructions  Sleep apnea occurs when you frequently stop breathing for 10 seconds or longer during sleep. It can be mild to severe, based on the number of times per hour that you stop breathing or have slowed breathing. Blocked or narrowed airways in your nose, mouth, or throat can cause sleep apnea. Your airway can become blocked when your throat muscles and tongue relax during sleep. Sleep apnea is common, occurring in 1 out of 20 individuals. Individuals having any of the following characteristics should be evaluated and treated right away due to high risk and detrimental consequences from untreated sleep apnea:  1. Obesity  2. Congestive Heart failure  3. Atrial Fibrillation  4. Uncontrolled Hypertension  5. Type II Diabetes  6. Night-time Arrhythmias  7. Stroke  8. Pulmonary Hypertension  9. High-risk Driving Populations (pilots, truck drivers, etc.)  10. Patients Considering Weight-loss Surgery    How do you know you have sleep apnea? You probably have sleep apnea if you answer 'yes' to 3 or more of the following questions:  S - Have you been told that you Snore? T - Are you often Tired during the day? O - Has anyone Observed you stop breathing while sleeping? P- Do you have (or are being treated for) high blood Pressure? B - Are you obese (Body Mass Index > 35)? A - Is your Age 48years old or older? N - Is your Neck size greater than 16 inches? G - Are you male Gender? A sleep physician can prescribe a breathing device that prevents tissues in the throat from blocking your airway.  Or your doctor may recommend using a dental device (oral breathing device) to help keep your airway open. In some cases, surgery may be needed to remove enlarged tissues in the throat. Follow-up care is a key part of your treatment and safety. Be sure to make and go to all appointments, and call your doctor if you are having problems. It's also a good idea to know your test results and keep a list of the medicines you take. How can you care for yourself at home? · Lose weight, if needed. It may reduce the number of times you stop breathing or have slowed breathing. · Go to bed at the same time every night. · Sleep on your side. It may stop mild apnea. If you tend to roll onto your back, sew a pocket in the back of your pajama top. Put a tennis ball into the pocket, and stitch the pocket shut. This will help keep you from sleeping on your back. · Avoid alcohol and medicines such as sleeping pills and sedatives before bed. · Do not smoke. Smoking can make sleep apnea worse. If you need help quitting, talk to your doctor about stop-smoking programs and medicines. These can increase your chances of quitting for good. · Prop up the head of your bed 4 to 6 inches by putting bricks under the legs of the bed. · Treat breathing problems, such as a stuffy nose, caused by a cold or allergies. · Use a continuous positive airway pressure (CPAP) breathing machine if lifestyle changes do not help your apnea and your doctor recommends it. The machine keeps your airway from closing when you sleep. · If CPAP does not help you, ask your doctor whether you should try other breathing machines. A bilevel positive airway pressure machine has two types of air pressureâone for breathing in and one for breathing out. Another device raises or lowers air pressure as needed while you breathe. · If your nose feels dry or bleeds when using one of these machines, talk with your doctor about increasing moisture in the air. A humidifier may help.   · If your nose is runny or stuffy from using a breathing machine, talk with your doctor about using decongestants or a corticosteroid nasal spray. When should you call for help? Watch closely for changes in your health, and be sure to contact your doctor if:  · You still have sleep apnea even though you have made lifestyle changes. · You are thinking of trying a device such as CPAP. · You are having problems using a CPAP or similar machine. Where can you learn more? Go to [employer]. Enter B033 in the search box to learn more about \"Sleep Apnea: After Your Visit. \"   © 8323-5713 Healthwise, Incorporated. Care instructions adapted under license by [facility] [employer] (which disclaims liability or warranty for this information). This care instruction is for use with your licensed healthcare professional. If you have questions about a medical condition or this instruction, always ask your healthcare professional. Napolean David any warranty or liability for your use of this information. PROPER SLEEP HYGIENE    What to avoid  · Do not have drinks with caffeine, such as coffee or black tea, for 8 hours before bed. · Do not smoke or use other types of tobacco near bedtime. Nicotine is a stimulant and can keep you awake. · Avoid drinking alcohol late in the evening, because it can cause you to wake in the middle of the night. · Do not eat a big meal close to bedtime. If you are hungry, eat a light snack. · Do not drink a lot of water close to bedtime, because the need to urinate may wake you up during the night. · Do not read or watch TV in bed. Use the bed only for sleeping and sexual activity. What to try  · Go to bed at the same time every night, and wake up at the same time every morning. Do not take naps during the day. · Keep your bedroom quiet, dark, and cool. · Get regular exercise, but not within 3 to 4 hours of your bedtime. .  · Sleep on a comfortable pillow and mattress.   · If watching the clock makes you anxious, turn it facing away from you so you cannot see the time. · If you worry when you lie down, start a worry book. Well before bedtime, write down your worries, and then set the book and your concerns aside. · Try meditation or other relaxation techniques before you go to bed. · If you cannot fall asleep, get up and go to another room until you feel sleepy. Do something relaxing. Repeat your bedtime routine before you go to bed again. · Make your house quiet and calm about an hour before bedtime. Turn down the lights, turn off the TV, log off the computer, and turn down the volume on music. This can help you relax after a busy day. Drowsy Driving  The [address] [place] [address] Road Traffic Safety Administration cites drowsiness as a causing factor in more than 004,237 police reported crashes annually, resulting in 76,000 injuries and 1,500 deaths. Other surveys suggest 55% of people polled have driven while drowsy in the past year, 23% had fallen asleep but not crashed, 3% crashed, and 2% had and accident due to drowsy driving. Who is at risk? Young Drivers: One study of drowsy driving accidents states that 55% of the drivers were under 25 years. Of those, 75% were male. Shift Workers and Travelers: People who work overnight or travel across time zones frequently are at higher risk of experiencing Circadian Rhythm Disorders. They are trying to work and function when their body is programed to sleep. Sleep Deprived: Lack of sleep has a serious impact on your ability to pay attention or focus on a task. Consistently getting less than the average of 8 hours your body needs creates partial or cumulative sleep deprivation. Untreated Sleep Disorders: Sleep Apnea, Narcolepsy, R.L.S., and other sleep disorders (untreated) prevent a person from getting enough restful sleep. This leads to excessive daytime sleepiness and increases the risk for drowsy driving accidents by up to 7 times.   Medications / Alcohol: Even over the counter medications can cause drowsiness. Medications that impair a drivers attention should have a warning label. Alcohol naturally makes you sleepy and on its own can cause accidents. Combined with excessive drowsiness its effects are amplified. Signs of Drowsy Driving:   * You don't remember driving the last few miles   * You may drift out of your jessica   * You are unable to focus and your thoughts wander   * You may yawn more often than normal   * You have difficulty keeping your eyes open / nodding off   * Missing traffic signs, speeding, or tailgating  Prevention-   Good sleep hygiene, lifestyle and behavioral choices have the most impact on drowsy driving. There is no substitute for sleep and the average person requires 8 hours nightly. If you find yourself driving drowsy, stop and sleep. Consider the sleep hygiene tips provided during your visit as well. Medication Refill Policy: Refills for all medications require 1 week advance notice. Please have your pharmacy fax a refill request. We are unable to fax, or call in \"controled substance\" medications and you will need to pick these prescriptions up from our office. [employer] Activation    Thank you for requesting access to [employer]. Please follow the instructions below to securely access and download your online medical record. [employer] allows you to send messages to your doctor, view your test results, renew your prescriptions, schedule appointments, and more. How Do I Sign Up? 1. In your internet browser, go to [email or web address]. 2. Click on the First Time User? Click Here link in the Sign In box. You will see the New Member Sign Up page. 3. Enter your [employer] Access Code exactly as it appears below. You will not need to use this code after youve completed the sign-up process. If you do not sign up before the expiration date, you must request a new code. [employer] Access Code:  Activation code not generated  Current [employer] Status: Active (This is the date your MePlease access code will )    4. Enter the last four digits of your Social Security Number (xxxx) and Date of Birth (mm/dd/yyyy) as indicated and click Submit. You will be taken to the next sign-up page. 5. Create a MePlease ID. This will be your MePlease login ID and cannot be changed, so think of one that is secure and easy to remember. 6. Create a MePlease password. You can change your password at any time. 7. Enter your Password Reset Question and Answer. This can be used at a later time if you forget your password. 8. Enter your e-mail address. You will receive e-mail notification when new information is available in [address] 9. Click Sign Up. You can now view and download portions of your medical record. 10. Click the Download Summary menu link to download a portable copy of your medical information. Additional Information    If you have questions, please call [phone]. Remember, MePlease is NOT to be used for urgent needs. For medical emergencies, dial 911.

## 2018-09-13 NOTE — MR AVS SNAPSHOT
Shreya Aleman 
 
 
 [address] [facility] [address] [address] 
[phone] Patient: Katelyn Mckeon MRN: [id number] NDZ:[id number] Visit Information Date & Time Provider Department Dept. Phone Encounter #  
 9/13/2018  8:15 AM Murali Mulligan MD [facility] and [place] [address] [id number] Follow-up Instructions Return in about 4 weeks (around 10/11/2018), or if symptoms worsen or fail to improve. Your Appointments 9/13/2018  2:40 PM  
New Patient with Kalee García MD  
[address] [facility] ([facility]) Appt Note: NP_ref by Dr. Alcantar Nina fatigue_  
 [address] [place] [address], [address] [phone] [address] [place] [address], [address] [address] Upcoming Health Maintenance Date Due Pneumococcal 19-64 Medium Risk (1 of 1 - PPSV23) 8/16/2019* Influenza Age 5 to Adult 9/13/2019* DTaP/Tdap/Td series (3 - Td) 8/16/2028 *Topic was postponed. The date shown is not the original due date. Allergies as of 9/13/2018  Review Complete On: 9/13/2018 By: Murali Mulligan MD  
  
 Severity Noted Reaction Type Reactions Cymbalta [Duloxetine]  08/30/2010    Other (comments) Erectile dysfunction Prozac [Fluoxetine]  08/30/2010    Other (comments) Decreased libido and erection Current Immunizations  Reviewed on 11/30/2010 Name Date  
 TD Vaccine 6/1/1988 Tdap 8/16/2018 Not reviewed this visit You Were Diagnosed With   
  
 Codes Comments Rotator cuff tendonitis, left    -  Primary ICD-10-CM: M75.82 ICD-9-CM: 726.10 Hypotestosteronemia     ICD-10-CM: E34.9 ICD-9-CM: 259.9 Chronic fatigue     ICD-10-CM: R53.82 
ICD-9-CM: 780.79 Vitals BP Pulse Temp Resp Height(growth percentile) Weight(growth percentile)  113/77 (BP 1 Location: Right arm, BP Patient Position: Sitting) 61 97.5 The skin of the bilateral groins was clipped, prepped and draped in the usual sterile manner. (If not otherwise specified, skin prep was bilateral.)  °F (36.4 °C) (Oral) 16 5' 6\" (1.676 m) 190 lb 11.2 oz (86.5 kg) SpO2 BMI Smoking Status 96% 30.78 kg/m2 Current Every Day Smoker Vitals History BMI and BSA Data Body Mass Index Body Surface Area 30.78 kg/m 2 2.01 m 2 Preferred Pharmacy Pharmacy Name Phone [facility] [address] [place] [address] [facility] [phone] Your Updated Medication List  
  
   
This list is accurate as of 9/13/18  9:04 AM.  Always use your most recent med list.  
  
  
  
  
 ibuprofen 200 mg tablet Commonly known as:  MOTRIN Take  by mouth.  
  
 pramoxine-hydrocortisone 1-1 % topical cream  
Commonly known as:  ANALPRAM HC Apply  to affected area three (3) times daily. We Performed the Following REFERRAL TO SLEEP STUDIES [REF99 Custom] TESTOSTERONE, FREE & TOTAL [38983 CPT(R)] Follow-up Instructions Return in about 4 weeks (around 10/11/2018), or if symptoms worsen or fail to improve. To-Do List   
 09/13/2018 Imaging:  XR SHOULDER LT AP/LAT MIN 2 V Referral Information Referral ID Referred By Referred To  
  
 [id number] Lane[address] Pineda [address] [facility] [address] [place] [address] Phone: [phone] Visits Status Start Date End Date 1 New Request 9/13/18 9/13/19 If your referral has a status of pending review or denied, additional information will be sent to support the outcome of this decision. Introducing [facility] [place] [facility]! Dear Leti Golden: 
Thank you for requesting a [employer] account. Our records indicate that you already have an active [employer] account. You can access your account anytime at [email or web address] Did you know that you can access your hospital and ER discharge instructions at any time in [employer]? You can also review all of your test results from your hospital stay or ER visit. Additional Information If you have questions, please visit the Frequently Asked Questions section of the [employer] website at [email or web address] Remember, [facility] is NOT to be used for urgent needs. For medical emergencies, dial 911. Now available from your iPhone and Android! Please provide this summary of care documentation to your next provider. Your primary care clinician is listed as Juan Jose Godfrey. If you have any questions after today's visit, please call [phone].

## 2018-09-13 NOTE — PROGRESS NOTES
HSAT Setup - [facility]  · Patient was educated on proper hookup and operation of the HSAT. · Instruction forms and documentation were reviewed and signed. · The patient demonstrated good understanding of the HSAT. · General information regarding operations and maintenance of the device was provided. · Patient was provided information on sleep apnea including coresponding risk factors and the importance of proper treatment. · Follow-up appointment was made to return the HSAT. He will be contacted once the results have been reviewed. · Patient was asked to contact our office for any problems regarding his home sleep test study.

## 2018-09-14 LAB
TESTOST FREE SERPL-MCNC: 6.6 PG/ML (ref 6.8–21.5)
TESTOST SERPL-MCNC: 292 NG/DL (ref 264–916)

## 2018-09-17 ENCOUNTER — DOCUMENTATION ONLY (OUTPATIENT)
Dept: SLEEP MEDICINE | Age: 48
End: 2018-09-17

## 2018-09-18 ENCOUNTER — TELEPHONE (OUTPATIENT)
Dept: SLEEP MEDICINE | Age: 48
End: 2018-09-18

## 2018-09-18 DIAGNOSIS — G47.33 OBSTRUCTIVE SLEEP APNEA (ADULT) (PEDIATRIC): Primary | ICD-10-CM

## 2018-09-18 NOTE — TELEPHONE ENCOUNTER
HSAT Returned-[facility]    Date of Study: 9/13/18    The following information was gathered from the patients study log:    · Lights off: 11PM  · Estimated sleep onset: 11:30PM    · Awakened a total of 3-4 times  · The patient felt they slept 9 hours  · Patient took none before starting the test  · Sleep quality was same compared to a usual nights sleep. Further information provided: Woke up a few times and adjusted to new position. I went right back to sleep, not sure of the times. Device returned 9/17/18.

## 2018-09-21 NOTE — TELEPHONE ENCOUNTER
Reviewed sleep study results with patient. He expressed understanding and is willing to proceed with optimizing sleep habits as reviewed during his office visit. He will follow up if symptoms worsen.

## 2018-09-21 NOTE — TELEPHONE ENCOUNTER
1st call to review sleep study results. A message was left for them to return our call at there earliest convenience.

## 2018-09-21 NOTE — TELEPHONE ENCOUNTER
Results of sleep study in [employer]  Lead tech to convey results to patient  . Results of HSAT in [employer]    The home sleep apnea test showed AHI - 2.hour. THe lowest oxygen saturation was 87%. This correlates with a test that is negative for significant sleep apnea. We had discussed treatment plan at initial consultation. Based on the results of the home sleep apnea test, APAP is not indicated at this time. Optimizing sleep habits by keeping bedtime and waketime regular; ensuring sufficient total sleep time; avoiding caffeine after 2 pm; avoid looking at the clock during the night. Ideally, the clock face should be turned away. A regular exercise schedule, at least 3 hours before bedtime, would be beneficial to improving sleep quality. avoid evening light and to use sunglasses in the late afternoon. Watching TV, using laptops, tablets and smartphones in the evening was discouraged. keep the bedroom cool and dark. Pets should not be allowed to sleep in the bed. Repeat HSAT is indicated if symptoms worsen.

## 2018-09-21 NOTE — TELEPHONE ENCOUNTER
Notified by technologist that patient asking if Patient asked if low testosterone could be effecting his sleep, as he had tested low testosterone 2 weeks ago    Yes, low testosterone can reduce sleep quality and contribute to  insomnia symptoms

## 2018-10-12 ENCOUNTER — OFFICE VISIT (OUTPATIENT)
Dept: INTERNAL MEDICINE CLINIC | Age: 48
End: 2018-10-12

## 2018-10-12 VITALS
DIASTOLIC BLOOD PRESSURE: 73 MMHG | OXYGEN SATURATION: 97 % | HEIGHT: 66 IN | WEIGHT: 188.8 LBS | SYSTOLIC BLOOD PRESSURE: 112 MMHG | RESPIRATION RATE: 18 BRPM | TEMPERATURE: 97.5 F | BODY MASS INDEX: 30.34 KG/M2 | HEART RATE: 70 BPM

## 2018-10-12 DIAGNOSIS — E78.00 ELEVATED CHOLESTEROL: ICD-10-CM

## 2018-10-12 DIAGNOSIS — M75.82 ROTATOR CUFF TENDONITIS, LEFT: Primary | ICD-10-CM

## 2018-10-12 NOTE — MR AVS SNAPSHOT
[facility] 
 
 
 [address] [address] 
[phone] Patient: Omar Cantu MRN: [id number] FNM:[id number] Visit Information Date & Time Provider Department Dept. Phone Encounter #  
 10/12/2018  9:00 AM Claudell Larve, MD [facility] and [place] [address] [id number] Follow-up Instructions Return in about 2 months (around 12/12/2018) for lipid and testosterone. Your Appointments 12/14/2018  9:30 AM  
Any with Claudell Larve, MD  
[address] [place] [address] and Primary Care [facility]) Appt Note: follow up 2mnth lipid and testosterone [place] [address] [facility]  
  
   
 [place] [address] [address] Upcoming Health Maintenance Date Due Pneumococcal 19-64 Medium Risk (1 of 1 - PPSV23) 8/16/2019* Influenza Age 5 to Adult 9/13/2019* DTaP/Tdap/Td series (3 - Td) 8/16/2028 *Topic was postponed. The date shown is not the original due date. Allergies as of 10/12/2018  Review Complete On: 10/12/2018 By: Claudell Larve, MD  
  
 Severity Noted Reaction Type Reactions Cymbalta [Duloxetine]  08/30/2010    Other (comments) Erectile dysfunction Prozac [Fluoxetine]  08/30/2010    Other (comments) Decreased libido and erection Current Immunizations  Reviewed on 11/30/2010 Name Date  
 TD Vaccine 6/1/1988 Tdap 8/16/2018 Not reviewed this visit You Were Diagnosed With   
  
 Codes Comments Elevated cholesterol    -  Primary ICD-10-CM: E78.00 ICD-9-CM: 272.0 Rotator cuff tendonitis, left     ICD-10-CM: M75.82 ICD-9-CM: 726.10 Vitals BP Pulse Temp Resp Height(growth percentile) Weight(growth percentile) 112/73 (BP 1 Location: Right arm, BP Patient Position: Sitting) 70 97.5 °F (36.4 °C) (Oral) 18 5' 6\" (1.676 m) 188 lb 12.8 oz (85.6 kg) SpO2 BMI Smoking Status 97% 30.47 kg/m2 Current Every Day Smoker Vitals History BMI and BSA Data Body Mass Index Body Surface Area  
 30.47 kg/m 2 2 m 2 Preferred Pharmacy Pharmacy Name Phone [facility] [address] [facility] [phone] Your Updated Medication List  
  
   
This list is accurate as of 10/12/18  9:49 AM.  Always use your most recent med list.  
  
  
  
  
 diclofenac 1 % Gel Commonly known as:  VOLTAREN Apply  to affected area four (4) times daily. ibuprofen 200 mg tablet Commonly known as:  MOTRIN Take  by mouth.  
  
 pramoxine-hydrocortisone 1-1 % topical cream  
Commonly known as:  ANALPRAM HC Apply  to affected area three (3) times daily. Follow-up Instructions Return in about 2 months (around 12/12/2018) for lipid and testosterone. To-Do List   
 10/12/2018 Imaging:  MRI SHOULDER LT WO CONT Patient Instructions Red Yeast Rice for cholesterol Introducing [facility] SERVICES! Dear Aimee Franco: 
Thank you for requesting a [email or web address] account. Our records indicate that you already have an active [email or web address] account. You can access your account anytime at [email or web address] Did you know that you can access your hospital and ER discharge instructions at any time in [email or web address]? You can also review all of your test results from your hospital stay or ER visit. Additional Information If you have questions, please visit the Frequently Asked Questions section of the [email or web address] website at [email or web address] Remember, [email or web address] is NOT to be used for urgent needs. For medical emergencies, dial 911. Now available from your iPhone and Android! Please provide this summary of care documentation to your next provider. Your primary care clinician is listed as Slime Molina. If you have any questions after today's visit, please call [phone].

## 2018-10-12 NOTE — PROGRESS NOTES
Chief Complaint   Patient presents with    Shoulder Pain     he is a 52y.o. year old male who presents for follow up of injury. Follow Up Pain Assessment Encounter      Onset of Symptoms: years  ________________________________________________________________________  Description: Patient states that he is a  and constantly aggravates his left shoulder. Patient has good days and bad days but last night was her idea for him. Pain Scale:(1-10): 4  Duration:  continuous  Radiation: none  What makes it better?: ice, OTC meds and rest  What makes it worse?:use   Medications tried: acetaminophen, ibuprofen  Modalities tried: exercises at home for 2 months        Reviewed and agree with Nurse Note and duplicated in this note. Reviewed PmHx, RxHx, FmHx, SocHx, AllgHx and updated and dated in the chart. Family History   Problem Relation Age of Onset    Hypertension Mother     Arthritis-osteo Mother     High Cholesterol Mother     Cancer Father      melanoma, ?lung       Past Medical History:   Diagnosis Date    Anxiety state, unspecified     Basal cell carcinoma     Calculus of kidney 8/17/2014    Left 6mm. Left 1 mm. Dr. Amador Huffman. 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 Dr. Cristal Camp.  Other and unspecified hyperlipidemia     Shin splints 04/24/08    Dr. Ray Hendricks. Gastoc Equines    Shoulder pain 05/16/01    Right. Tendinitis. Dr. Fazal Saleh    Tinnitus 2007    Right.  Dr. Willadean Sacks      Social History     Social History    Marital status: SINGLE     Spouse name: N/A    Number of children: N/A    Years of education: N/A     Social History Main Topics    Smoking status: Current Every Day Smoker     Packs/day: 0.50     Years: 24.00     Types: Cigarettes    Smokeless tobacco: Never Used      Comment: Vapor    Alcohol use 3.0 - 5.0 oz/week     6 - 10 Cans of beer per week    Drug use: No    Sexual activity: Yes     Partners: Female Other Topics Concern    None     Social History Narrative        Review of Systems - negative except as listed above      Objective:     Vitals:    10/12/18 0929   BP: 112/73   Pulse: 70   Resp: 18   Temp: 97.5 °F (36.4 °C)   TempSrc: Oral   SpO2: 97%   Weight: 188 lb 12.8 oz (85.6 kg)   Height: 5' 6\" (1.676 m)       Physical Examination: General appearance - alert, well appearing, and in no distress  Neurological - alert, oriented, normal speech, no focal findings or movement disorder noted  Musculoskeletal - The left shoulder is  normal to inspection. The patient has full range of motion  . The shoulderis tender to palpation laterally. Bicep tendon: non-tender  The NEER test is negative. The Church test:is positive   The Cross over test:is  negative. The Empty Can test:is  negative. Stressed ext rotation is:  negative. Stressed int rotation: negative. The Apprehension Sign is negative. The Lift off test is:  negative   Examination reveals the Painful Arch:  negative. The Labral Test is:  negative. The Sulcus Sign is:  negative. Extremities - peripheral pulses normal, no pedal edema, no clubbing or cyanosis  Skin - normal coloration and turgor, no rashes, no suspicious skin lesions noted    Assessment/ Plan:   Diagnoses and all orders for this visit:    1. Rotator cuff tendonitis, left  -     MRI SHOULDER LT WO CONT; Future    2. Elevated cholesterol    MRI due to failure of conservative measures which include over-the-counter anti-inflammatories and home exercises for 2 months  Follow-up Disposition:  Return in about 2 months (around 12/12/2018) for lipid and testosterone.     Pathophysiology, recovery and rehabilitation process discussed and questions answered   Counseling for 30 Minutes of the total visit duration   Pictures and figures used as necessary   Provided reassurance   Monitor response to home exercises   Recommend  lower impact activities-walking, Eliptical, Nordic Track, cycling or swimming   Follow up in 4 week(s)    Patient was informed/counseled on: Body mass index is 30.47 kg/(m^2). 1) Remember to stay active and/or exercise regularly (I suggest 30-45 minutes daily)   2) For reliable dietary information, go to www. EATRIGHT.org. You may wish to consider seeing the nutritionist at [facility] [phone], also consider the [address] I have discussed the diagnosis with the patient and the intended plan as seen in the above orders. The patient has received an after-visit summary and questions were answered concerning future plans. Medication Side Effects and Warnings were discussed with patient: yes  Patient Labs were reviewed and or requested: yes  Patient Past Records were reviewed and or requested  yes  I have discussed the diagnosis with the patient and the intended plan as seen in the above orders. The patient has received an after-visit summary and questions were answered concerning future plans. Pt agrees to call or return to clinic and/or go to closest ER with any worsening of symptoms. This may include, but not limited to increased fever (>100.4) with NSAIDS or Tylenol, increased edema, confusion, rash, worsening of presenting symptoms.

## 2018-10-24 ENCOUNTER — TELEPHONE (OUTPATIENT)
Dept: INTERNAL MEDICINE CLINIC | Age: 48
End: 2018-10-24

## 2018-10-24 NOTE — TELEPHONE ENCOUNTER
Renetta from [facility] called about the pts MRI. She said she is working on an authorization for it and it does not meet guidelines. Either Dr. Dana Rodriguez or a nurse needs to call for a peer to peer.      The number is: [phone]

## 2018-10-29 ENCOUNTER — HOSPITAL ENCOUNTER (OUTPATIENT)
Dept: MRI IMAGING | Age: 48
Discharge: HOME | End: 2018-10-29
Attending: FAMILY MEDICINE
Payer: COMMERCIAL

## 2018-10-29 DIAGNOSIS — M75.82 ROTATOR CUFF TENDONITIS, LEFT: ICD-10-CM

## 2018-10-29 PROCEDURE — 73221 MRI JOINT UPR EXTREM W/O DYE: CPT

## 2018-10-29 NOTE — PROGRESS NOTES
No tears, mild \"fraying\" or irritation of rotator cuff. Physical therapy for this. Steroid injection if needed for pain.

## 2018-12-14 ENCOUNTER — OFFICE VISIT (OUTPATIENT)
Dept: INTERNAL MEDICINE CLINIC | Age: 48
End: 2018-12-14

## 2018-12-14 VITALS
HEIGHT: 66 IN | OXYGEN SATURATION: 97 % | WEIGHT: 187 LBS | HEART RATE: 77 BPM | TEMPERATURE: 98.1 F | RESPIRATION RATE: 16 BRPM | BODY MASS INDEX: 30.05 KG/M2 | DIASTOLIC BLOOD PRESSURE: 79 MMHG | SYSTOLIC BLOOD PRESSURE: 109 MMHG

## 2018-12-14 DIAGNOSIS — E78.00 ELEVATED CHOLESTEROL: ICD-10-CM

## 2018-12-14 DIAGNOSIS — R79.89 LOW TESTOSTERONE IN MALE: Primary | ICD-10-CM

## 2018-12-14 NOTE — PROGRESS NOTES
Chief Complaint   Patient presents with    Cholesterol Problem     he is a 50y.o. year old male who presents for follow-up of   hyperlipidemia. Cardiovascular risk analysis - hyperlipidemia. Compliance with treatment thus far has been fair. New concerns: testosterone levels as well and to review MRI . Social History, Diet and Lifestyle: not attempting to follow a low fat, low cholesterol diet, not attempting to follow a low sodium diet, exercises regularly, nonsmoker      Lab Results   Component Value Date/Time    Cholesterol, total 221 (H) 08/16/2018 08:57 AM    HDL Cholesterol 29 (L) 08/16/2018 08:57 AM    LDL, calculated 166 (H) 08/16/2018 08:57 AM    Triglyceride 131 08/16/2018 08:57 AM     Lab Results   Component Value Date/Time    ALT (SGPT) 36 08/21/2014 04:40 AM    AST (SGOT) 20 08/21/2014 04:40 AM    Alk. phosphatase 109 08/21/2014 04:40 AM    Bilirubin, total 0.6 08/21/2014 04:40 AM    Albumin 4.5 08/21/2014 04:40 AM    Protein, total 7.9 08/21/2014 04:40 AM    PLATELET 738 93/26/1045 08:57 AM        ROS: taking medications as instructed, no medication side effects noted, no TIA's, no chest pain on exertion, no dyspnea on exertion, no swelling of ankles. Reviewed and agree with Nurse Note and duplicated in this note. Reviewed PmHx, RxHx, FmHx, SocHx, AllgHx and updated and dated in the chart. Family History   Problem Relation Age of Onset    Hypertension Mother     Arthritis-osteo Mother     High Cholesterol Mother     Cancer Father         melanoma, ?lung       Past Medical History:   Diagnosis Date    Anxiety state, unspecified     Basal cell carcinoma     Calculus of kidney 8/17/2014    Left 6mm. Left 1 mm. Dr. Pallavi Ferrera. 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 Dr. Criselda Howard.  Other and unspecified hyperlipidemia     Shin splints 04/24/08    Dr. Forrest Shi. Gastoc Equines    Shoulder pain 05/16/01    Right. Tendinitis.   Dr. Alka Hernandez Sue Davies Tinnitus 2007    Right. Dr. Lenard Bloch History     Socioeconomic History    Marital status: SINGLE     Spouse name: Not on file    Number of children: Not on file    Years of education: Not on file    Highest education level: Not on file   Tobacco Use    Smoking status: Current Every Day Smoker     Packs/day: 0.50     Years: 24.00     Pack years: 12.00     Types: Cigarettes    Smokeless tobacco: Never Used    Tobacco comment: Vapor   Substance and Sexual Activity    Alcohol use: Yes     Alcohol/week: 3.0 - 5.0 oz     Types: 6 - 10 Cans of beer per week    Drug use: No    Sexual activity: Yes     Partners: Female      Current Outpatient Medications   Medication Sig Dispense Refill    diclofenac (VOLTAREN) 1 % gel Apply  to affected area four (4) times daily.  ibuprofen (MOTRIN) 200 mg tablet Take  by mouth.  pramoxine-hydrocortisone (ANALPRAM HC) 1-1 % topical cream Apply  to affected area three (3) times daily. 30 g 0     Allergies   Allergen Reactions    Cymbalta [Duloxetine] Other (comments)     Erectile dysfunction      Prozac [Fluoxetine] Other (comments)     Decreased libido and erection     Past Medical History:   Diagnosis Date    Anxiety state, unspecified     Basal cell carcinoma     Calculus of kidney 8/17/2014    Left 6mm. Left 1 mm. Dr. Meredith Grady. 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 Dr. Mica Mann.  Other and unspecified hyperlipidemia     Shin splints 04/24/08    Dr. Renee Arias. Gastoc Equines    Shoulder pain 05/16/01    Right. Tendinitis. Dr. Danielle Reyes    Tinnitus 2007    Right. Dr. Mery Tanner     Past Surgical History:   Procedure Laterality Date    HX LITHOTRIPSY  08/2014    Left. Dr. Meredith Grady.     HX OTHER SURGICAL  1996    birth lizzeth removed from Rt buttock    HX REFRACTIVE SURGERY  12/2005    HX SKIN BIOPSY  2012    Dr. Izaiah Dobbs from left back of leg    HX TONSILLECTOMY      HX UROLOGICAL  08/2014, 09/2014    Left KIDNEY STENT THEN REMOVAL. DUE TO KIDNEY STONES. Dr. Karl Ashley.  HX WISDOM TEETH EXTRACTION      NERVE BLOCK  07/2010    C3-4, C4-5. Dr. Feliciano Pack. Family History   Problem Relation Age of Onset    Hypertension Mother     Arthritis-osteo Mother     High Cholesterol Mother     Cancer Father         melanoma, ?lung           Objective:     Vitals:    12/14/18 0952   Weight: 187 lb (84.8 kg)   Height: 5' 6\" (1.676 m)       Physical Examination: General appearance - alert, well appearing, and in no distress  Eyes - pupils equal and reactive, extraocular eye movements intact  Ears - bilateral TM's and external ear canals normal  Nose - normal and patent, no erythema, discharge or polyps  Mouth - mucous membranes moist, pharynx normal without lesions  Neck - supple, no significant adenopathy  Chest - clear to auscultation, no wheezes, rales or rhonchi, symmetric air entry  Heart - normal rate, regular rhythm, normal S1, S2, no murmurs, rubs, clicks or gallops  Abdomen - soft, nontender, nondistended, no masses or organomegaly  Extremities - peripheral pulses normal, no pedal edema, no clubbing or cyanosis  Skin - normal coloration and turgor, no rashes, no suspicious skin lesions noted    Assessment/ Plan:   Diagnoses and all orders for this visit:    1. Low testosterone in male  -     TESTOSTERONE, FREE & TOTAL    2. Elevated cholesterol  -     LIPID PANEL       Follow-up Disposition:  Return if symptoms worsen or fail to improve. I have discussed the diagnosis with the patient and the intended plan as seen in the above orders. The patient has received an after-visit summary and questions were answered concerning future plans.      Medication Side Effects and Warnings were discussed with patient,  Patient Labs were reviewed and or requested, and  Patient Past Records were reviewed and or requested  yes         Pt agrees to call or return to clinic and/or go to closest ER with any worsening of symptoms. This may include, but not limited to increased fever (>100.4) with NSAIDS or Tylenol, increased edema, confusion, rash, worsening of presenting symptoms.

## 2018-12-16 LAB
CHOLEST SERPL-MCNC: 221 MG/DL (ref 100–199)
HDLC SERPL-MCNC: 33 MG/DL
LDLC SERPL CALC-MCNC: 158 MG/DL (ref 0–99)
TESTOST FREE SERPL-MCNC: 8.3 PG/ML (ref 6.8–21.5)
TESTOST SERPL-MCNC: 316 NG/DL (ref 264–916)
TRIGL SERPL-MCNC: 148 MG/DL (ref 0–149)
VLDLC SERPL CALC-MCNC: 30 MG/DL (ref 5–40)

## 2018-12-17 RX ORDER — ROSUVASTATIN CALCIUM 5 MG/1
5 TABLET, COATED ORAL
Qty: 30 TAB | Refills: 6 | Status: SHIPPED | OUTPATIENT
Start: 2018-12-17 | End: 2020-03-30 | Stop reason: SDUPTHER

## 2019-06-06 ENCOUNTER — OFFICE VISIT (OUTPATIENT)
Dept: INTERNAL MEDICINE CLINIC | Age: 49
End: 2019-06-06

## 2019-06-06 VITALS
DIASTOLIC BLOOD PRESSURE: 79 MMHG | WEIGHT: 193.2 LBS | HEIGHT: 66 IN | RESPIRATION RATE: 16 BRPM | SYSTOLIC BLOOD PRESSURE: 125 MMHG | TEMPERATURE: 97.9 F | BODY MASS INDEX: 31.05 KG/M2 | HEART RATE: 73 BPM | OXYGEN SATURATION: 97 %

## 2019-06-06 DIAGNOSIS — R10.9 LEFT FLANK PAIN: Primary | ICD-10-CM

## 2019-06-06 LAB
BILIRUB UR QL STRIP: NEGATIVE
GLUCOSE UR-MCNC: NEGATIVE MG/DL
KETONES P FAST UR STRIP-MCNC: NEGATIVE MG/DL
PH UR STRIP: 7 [PH] (ref 4.6–8)
PROT UR QL STRIP: NEGATIVE
SP GR UR STRIP: 1.02 (ref 1–1.03)
UA UROBILINOGEN AMB POC: NORMAL (ref 0.2–1)
URINALYSIS CLARITY POC: CLEAR
URINALYSIS COLOR POC: YELLOW
URINE BLOOD POC: NORMAL
URINE LEUKOCYTES POC: NEGATIVE
URINE NITRITES POC: NEGATIVE

## 2019-06-06 NOTE — PROGRESS NOTES
Chief Complaint   Patient presents with    Back Pain     he is a 50y.o. year old male who presents for evaluation of mid to low back pain  Pain Assessment Encounter      Veronica Divya  6/6/2019  Onset of Symptoms: a couple weeks  ________________________________________________________________________  Description: Dull aching pain on left low back. [facility] relaxers, Flomax and anti-inflammatories with no relief of pain. States that he does have history about 5 years of having kidney stones which required lithotripsy. He has not been followed by urology since then. Frequency: more than 5 times a day  Pain Scale:(1-10): 4  Trauma Hx: none  Hx of similar symptoms: Yes  Radiation: NO,  Duration:  continuous      Progression: is unchanged  What makes it better?: OTC meds and rest  What makes it worse?:always there and any movement  Medications tried: acetaminophen, ibuprofen    Reviewed and agree with Nurse Note and duplicated in this note. Reviewed PmHx, RxHx, FmHx, SocHx, AllgHx and updated and dated in the chart. Family History   Problem Relation Age of Onset    Hypertension Mother     Arthritis-osteo Mother     High Cholesterol Mother     Cancer Father         melanoma, ?lung       Past Medical History:   Diagnosis Date    Anxiety state, unspecified     Basal cell carcinoma     Calculus of kidney 8/17/2014    Left 6mm. Left 1 mm. Dr. Victorina Raphael. 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 Dr. Emely Cooper.  Other and unspecified hyperlipidemia     Shin splints 04/24/08    Dr. Denis Poe. Gastoc Equines    Shoulder pain 05/16/01    Right. Tendinitis. Dr. Soto Pert    Tinnitus 2007    Right.  Dr. Sergio Fong History     Socioeconomic History    Marital status: SINGLE     Spouse name: Not on file    Number of children: Not on file    Years of education: Not on file    Highest education level: Not on file   Tobacco Use    Smoking status: Current Every Day Smoker     Packs/day: 0.50     Years: 24.00     Pack years: 12.00     Types: Cigarettes    Smokeless tobacco: Never Used    Tobacco comment: Vapor   Substance and Sexual Activity    Alcohol use: Yes     Alcohol/week: 3.0 - 5.0 oz     Types: 6 - 10 Cans of beer per week    Drug use: No    Sexual activity: Yes     Partners: Female        Review of Systems - negative except as listed above      Objective:     Vitals:    06/06/19 1504   BP: 125/79   Pulse: 73   Resp: 16   Temp: 97.9 °F (36.6 °C)   TempSrc: Oral   SpO2: 97%   Weight: 193 lb 3.2 oz (87.6 kg)   Height: 5' 6\" (1.676 m)       Physical Examination: General appearance - alert, well appearing, and in no distress  Chest - clear to auscultation, no wheezes, rales or rhonchi, symmetric air entry  Heart - normal rate, regular rhythm, normal S1, S2, no murmurs, rubs, clicks or gallops  Abdomen - soft, nontender, nondistended, no masses or organomegaly  Neurological - alert, oriented, normal speech, no focal findings or movement disorder noted  Musculoskeletal - no joint tenderness, deformity or swelling  Extremities - peripheral pulses normal, no pedal edema, no clubbing or cyanosis  Skin - normal coloration and turgor, no rashes, no suspicious skin lesions noted    Assessment/ Plan:   Diagnoses and all orders for this visit:    1.  Left flank pain  -     AMB POC URINALYSIS DIP STICK AUTO W/O MICRO  -     CT ABD PELV WO CONT; Future         Pathophysiology, recovery and rehabilitation process discussed and questions answered   Counseling for 30 Minutes of the total visit duration   Pictures and figures used as necessary   Provided reassurance   Recommend activity modification   Recommend  lower impact activities-walking, Eliptical, Nordic Track, cycling or swimming   Follow up in 4 week(s)       1) Remember to stay active and/or exercise regularly (I suggest 30-45 minutes daily)   2) For reliable dietary information, go to www. [employer]AT[employer].org. You may wish to consider seeing the nutritionist at [facility] [phone], also consider the [address] I have discussed the diagnosis with the patient and the intended plan as seen in the above orders. The patient has received an after-visit summary and questions were answered concerning future plans. Medication Side Effects and Warnings were discussed with patient,  Patient Labs were reviewed and or requested, and  Patient Past Records were reviewed and or requested  yes      Pt agrees to call or return to clinic and/or go to closest ER with any worsening of symptoms. This may include, but not limited to increased fever (>100.4) with NSAIDS or Tylenol, increased edema, confusion, rash, worsening of presenting symptoms.

## 2019-06-12 ENCOUNTER — HOSPITAL ENCOUNTER (OUTPATIENT)
Dept: CT IMAGING | Age: 49
Discharge: HOME | End: 2019-06-12
Attending: FAMILY MEDICINE
Payer: COMMERCIAL

## 2019-06-12 DIAGNOSIS — R10.9 LEFT FLANK PAIN: ICD-10-CM

## 2019-06-12 PROCEDURE — 74176 CT ABD & PELVIS W/O CONTRAST: CPT

## 2019-06-18 DIAGNOSIS — N20.0 KIDNEY STONE: Primary | ICD-10-CM

## 2019-10-07 ENCOUNTER — OFFICE VISIT (OUTPATIENT)
Dept: INTERNAL MEDICINE CLINIC | Age: 49
End: 2019-10-07

## 2019-10-07 VITALS
HEIGHT: 66 IN | BODY MASS INDEX: 32.42 KG/M2 | WEIGHT: 201.7 LBS | SYSTOLIC BLOOD PRESSURE: 119 MMHG | DIASTOLIC BLOOD PRESSURE: 79 MMHG | OXYGEN SATURATION: 99 % | HEART RATE: 65 BPM | RESPIRATION RATE: 16 BRPM

## 2019-10-07 DIAGNOSIS — R25.2 MUSCLE CRAMPING: ICD-10-CM

## 2019-10-07 DIAGNOSIS — M50.30 DDD (DEGENERATIVE DISC DISEASE), CERVICAL: Primary | ICD-10-CM

## 2019-10-07 NOTE — PROGRESS NOTES
Chief Complaint   Patient presents with    Spasms     he is a 50y.o. year old male who presents for evaluation of muscle spasming all over the body with certain movements. 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 States that he will wake up in the middle night with his right forearm cramping and will also occasionally occur in his right upper neck. Patient states that when he goes to stretch his left calf will cramp. Denies any's symptoms recently but is \"tired of pain\". He does have a upcoming appointment with neurosurgery at [facility] and agree with Nurse Note and duplicated in this note. Reviewed PmHx, RxHx, FmHx, SocHx, AllgHx and updated and dated in the chart. Family History   Problem Relation Age of Onset    Hypertension Mother     Arthritis-osteo Mother     High Cholesterol Mother     Cancer Father         melanoma, ?lung       Past Medical History:   Diagnosis Date    Anxiety state, unspecified     Basal cell carcinoma     Calculus of kidney 8/17/2014    Left 6mm. Left 1 mm. Dr. Batsheva Sinha. 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 Dr. Jesus Burton.  Other and unspecified hyperlipidemia     Shin splints 04/24/08    Dr. Fahad Brooke. Gastoc Equines    Shoulder pain 05/16/01    Right. Tendinitis. Dr. Nigel Rincon    Tinnitus 2007    Right. Dr. Loving Stalling History     Socioeconomic History    Marital status: SINGLE     Spouse name: Not on file    Number of children: Not on file    Years of education: Not on file    Highest education level: Not on file   Tobacco Use    Smoking status: Current Every Day Smoker     Packs/day: 0.50     Years: 24.00     Pack years: 12.00     Types: Cigarettes    Smokeless tobacco: Never Used    Tobacco comment: Vapor   Substance and Sexual Activity    Alcohol use:  Yes     Alcohol/week: 5.0 - 8.3 standard drinks     Types: 6 - 10 Cans of beer per week    Drug use: No    Sexual activity: Yes     Partners: Female        Review of Systems - negative except as listed above      Objective:     Vitals:    10/07/19 1432   BP: 119/79   Pulse: 65   Resp: 16   SpO2: 99%   Weight: 201 lb 11.2 oz (91.5 kg)   Height: 5' 6\" (1.676 m)       Physical Examination: General appearance - alert, well appearing, and in no distress  Eyes - pupils equal and reactive, extraocular eye movements intact  Ears - bilateral TM's and external ear canals normal  Nose - normal and patent, no erythema, discharge or polyps  Mouth - mucous membranes moist, pharynx normal without lesions  Neck - supple, no significant adenopathy  Chest - clear to auscultation, no wheezes, rales or rhonchi, symmetric air entry  Heart - normal rate, regular rhythm, normal S1, S2, no murmurs, rubs, clicks or gallops  Abdomen - soft, nontender, nondistended, no masses or organomegaly  Neurological - alert, oriented, normal speech, no focal findings or movement disorder noted, cranial nerves II through XII intact, DTR's normal and symmetric, normal muscle tone, no tremors, strength 5/5  Musculoskeletal - no joint tenderness, deformity or swelling  Extremities - peripheral pulses normal, no pedal edema, no clubbing or cyanosis  Skin - normal coloration and turgor, no rashes, no suspicious skin lesions noted    Assessment/ Plan:   Diagnoses and all orders for this visit:    1. DDD (degenerative disc disease), cervical  -     REFERRAL TO NEUROSURGERY    2. Muscle cramping  -     REFERRAL TO PHYSICAL THERAPY  -     METABOLIC PANEL, BASIC  -     MAGNESIUM    Increase water intake daily  Likely would benefit from consider effort to increase stretching and/or incorporate yoga or some form of body movement exercises into his regimen. I have discussed the diagnosis with the patient and the intended plan as seen in the above orders.   The patient has received an after-visit summary and questions were answered concerning future plans. Medication Side Effects and Warnings were discussed with patient,  Patient Labs were reviewed and or requested, and  Patient Past Records were reviewed and or requested  yes       Pt agrees to call or return to clinic and/or go to closest ER with any worsening of symptoms. This may include, but not limited to increased fever (>100.4) with NSAIDS or Tylenol, increased edema, confusion, rash, worsening of presenting symptoms.

## 2019-10-08 LAB
BUN SERPL-MCNC: 13 MG/DL (ref 6–24)
BUN/CREAT SERPL: 13 (ref 9–20)
CALCIUM SERPL-MCNC: 9.3 MG/DL (ref 8.7–10.2)
CHLORIDE SERPL-SCNC: 102 MMOL/L (ref 96–106)
CO2 SERPL-SCNC: 24 MMOL/L (ref 20–29)
CREAT SERPL-MCNC: 0.97 MG/DL (ref 0.76–1.27)
GLUCOSE SERPL-MCNC: 87 MG/DL (ref 65–99)
MAGNESIUM SERPL-MCNC: 1.9 MG/DL (ref 1.6–2.3)
POTASSIUM SERPL-SCNC: 4.1 MMOL/L (ref 3.5–5.2)
SODIUM SERPL-SCNC: 141 MMOL/L (ref 134–144)

## 2019-10-08 NOTE — PROGRESS NOTES
Your electrolytes and magnesium level are all within normal limits. Please work with neurology to see if there is any other concerns for your muscle spasms.   I would also continue with plan for physical therapy

## 2019-11-11 ENCOUNTER — HOSPITAL ENCOUNTER (OUTPATIENT)
Dept: PHYSICAL THERAPY | Age: 49
Discharge: HOME | End: 2019-11-11
Payer: COMMERCIAL

## 2019-11-11 DIAGNOSIS — R25.2 MUSCLE CRAMPING: ICD-10-CM

## 2019-11-11 PROCEDURE — 97110 THERAPEUTIC EXERCISES: CPT | Performed by: PHYSICAL THERAPIST

## 2019-11-11 PROCEDURE — 97161 PT EVAL LOW COMPLEX 20 MIN: CPT | Performed by: PHYSICAL THERAPIST

## 2019-11-11 NOTE — PROGRESS NOTES
[facility]  [address] [address], [address] [place] [address]  Phone: [phone]  Fax: [phone]    Plan of Care/Statement of Necessity for Physical Therapy Services  2-15    Patient name: Yary Funes  : 1970  Provider#: [id number]  Referral source: Mele Garsia MD      Medical/Treatment Diagnosis: Muscle cramping [R25.2]     Prior Hospitalization: see medical history     Comorbidities: anxiety, hearing impaired  Prior Level of Function: chronic limitations  Medications: Verified on Patient Summary List    Start of Care: 19      Onset Date: 10+ years ago       The Plan of Care and following information is based on the information from the initial evaluation. Assessment/ key information: Patient reports with chronic R sided neck and shoulder pain with evidence of radicular symptoms. His chief complaint today is intermittent shoulder pain, owing likely to subscapularis partial tear. His muscle cramping symptoms are less clear, but may be a manifestation of lower cervical radiculopathy and/or thoracic outlet syndrome.      Evaluation Complexity History MEDIUM  Complexity : 1-2 comorbidities / personal factors will impact the outcome/ POC ; Examination HIGH Complexity : 4+ Standardized tests and measures addressing body structure, function, activity limitation and / or participation in recreation  ;Presentation LOW Complexity : Stable, uncomplicated  ;Clinical Decision Making MEDIUM Complexity : FOTO score of 26-74  Overall Complexity Rating: LOW     Problem List: pain affecting function, decrease ROM, decrease ADL/ functional abilitiies, decrease activity tolerance and decrease flexibility/ joint mobility   Treatment Plan may include any combination of the following: Therapeutic exercise, Therapeutic activities, Neuromuscular re-education, Physical agent/modality, Manual therapy, Patient education and Self Care training  Patient / Family readiness to learn indicated by: asking questions and interest  Persons(s) to be included in education: patient (P)  Barriers to Learning/Limitations: None  Patient Goal (s): some relief and better understanding of what's happening  Patient Self Reported Health Status: fair  Rehabilitation Potential: fair    Long Term Goals: To be accomplished in 6-8 weeks:   1. Pt will be able to drive without increase in symptoms   2. Pt will report at least a 50% reduction in muscle cramping   3. Pt will be able to self-manage care using updated HEP for improved independence   4. 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 Improved FOTO score to 69 or better to demonstrate improved function  Frequency / Duration: Patient to be seen 2 times per week for 6-8 weeks. Patient/ Caregiver education and instruction: self care, activity modification and exercises    [x]  Plan of care has been reviewed with FARSHAD Tobar, PT 11/11/2019     ________________________________________________________________________    I certify that the above Therapy Services are being furnished while the patient is under my care. I agree with the treatment plan and certify that this therapy is necessary.     [de-identified] Signature:____________________  Date:____________Time: _________

## 2019-11-11 NOTE — PROGRESS NOTES
PT INITIAL EVALUATION NOTE - [facility] 2-15    Patient Name: Yola Madison  Date:2019  : 1970  [x]  Patient  Verified  Payor: Gurinder Pattersondarek / Plan: Basim Archuleta / Product Type: HMO /    In time:1110a  Out time: 1225p  Total Treatment Time (min): 75  Total Timed Codes (min): 30  1:1 Treatment Time ( only): 30  Visit #: 1     Treatment Area: Muscle cramping [R25.2]    SUBJECTIVE  Pain Level (0-10 scale): 3-4/10  Any medication changes, allergies to medications, adverse drug reactions, diagnosis change, or new procedure performed?: [] No    [x] Yes (see summary sheet for update)  Subjective:    Patient reports with 10 year history of neck pain, R sided. Went through PT and injections and the pain subsided for a while and then returned after a few years and he repeated the protocol. He again had a flare up a few years ago and he went for an MRI which revealed some nerve root compression. MRI to his R shoulder revealed a small tear of his subscapularis. In the last year, he has noticed more cramping in his chest, shoulder, triceps and palmar side forearm. It also goes into the right side of his back (lats?). Says that he notices it when he tenses the muscles, says they last anywhere from 10-30 seconds. 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 He saw a neurosurgeon recently and he didn't think he was a surgical candidate right now. 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  55deg    Shoulder ROM   Flexion and abduction WFLs  ER: T4 bilaterally  IR: T10 on R, painful   T8 on L    UPPER QUARTER   MUSCLE STRENGTH  KEY       R  L  0 - No Contraction  C1, C2 Neck Flex --  --  1 - Trace   C3 Side Flex  --  --  2 - Poor   C4 Sh Elev  5  5  3 - Fair    C5 Deltoid/Biceps 5  5  4 - Good   C6 Wrist Ext  5  5  5 - Normal   C7 Triceps  5  5      C8 Thumb Ext  5  5      T1 Hand Inst  5  5    Mobility Assessment: Cervical P-A mobility: hypomobile, particularly in lower cervical segments     Upsloping glides: reduced pain with R upsloping mobilization to lower cervical segments     Downsloping glides: reduced pain with bilateral lower cervical mobilization      MMT: Shoulder ER/IR: 5/5 bilaterally    Neurological: Reflexes / Sensations: intact to light touch  Special Tests:    Spurlings: (+) to R   Cervical Compression (+) for pain  Cervical Distraction: (+) for relief   Lift off (+) for subscap    ULTT: (+) for median nerve>radial>ulnar   Adson's (+) for weakening pulse  Allens (+) for absent pulse in test position    25 min Therapeutic Exercise:  [x] See flow sheet : implemented HEP, discussed altering positioning and emphasis on improving posture and reducing muscle/nerve tension   Rationale: increase ROM to improve the patients ability to drive without pain          With   [] TE   [] TA   [] neuro   [] other: Patient Education: [x] Review HEP    [] Progressed/Changed HEP based on:   [] positioning   [] body mechanics   [] transfers   [] heat/ice application    [] other:      Other Objective/Functional Measures:  FOTO: 61    Pain Level (0-10 scale) post treatment: 3-4/10    ASSESSMENT/Changes in Function:     [x]  See Plan of [place] PT 11/11/2019

## 2019-11-19 ENCOUNTER — HOSPITAL ENCOUNTER (OUTPATIENT)
Dept: PHYSICAL THERAPY | Age: 49
Discharge: HOME | End: 2019-11-19
Payer: COMMERCIAL

## 2019-11-19 PROCEDURE — 97140 MANUAL THERAPY 1/> REGIONS: CPT

## 2019-11-19 PROCEDURE — 97110 THERAPEUTIC EXERCISES: CPT

## 2019-11-19 NOTE — PROGRESS NOTES
PT DAILY TREATMENT NOTE - [facility] 2-15    Patient Name: Lauri Magallanes  Date:2019  : 1970  [x]  Patient  Verified  Payor: Yasmin Neely / Plan: Caprice Arroyo / Product Type: HMO /    In time:8:47A  Out time:9:25A  Total Treatment Time (min): 38  Total Timed Codes (min): 38  1:1 Treatment Time ( only): 23   Visit #:  2    Treatment Area: Right shoulder pain [M25.511]  Neck pain [M54.2]    SUBJECTIVE  Pain Level (0-10 scale): 3/10  Any medication changes, allergies to medications, adverse drug reactions, diagnosis change, or new procedure performed?: [x] No    [] Yes (see summary sheet for update)  Subjective functional status/changes:   [] No changes reported  Pt reports feeling about the same but notices that doorway stretch really make him feel better. OBJECTIVE      30 min Therapeutic Exercise:  [x] See flow sheet :   Rationale: increase ROM, increase strength and improve coordination to improve the patients ability to increase function abd mobilty      8 min Manual Therapy: STM/MFR B cervical paraspinals, UT, LS, manual cervical traction    Rationale: decrease pain, increase ROM, increase tissue extensibility and decrease trigger points to improve the patients ability to increase mobility            With   [] TE   [] TA   [] neuro   [] other: Patient Education: [x] Review HEP    [] Progressed/Changed HEP based on:   [] positioning   [] body mechanics   [] transfers   [] heat/ice application    [] other:      Other Objective/Functional Measures: --     Pain Level (0-10 scale) post treatment: 3/10    ASSESSMENT/Changes in Function:   Pt reported no change in symptoms with manual therapy or theraband rows today. spoke with pt regarding where he can put his -estim pads for his hime TENs unit.  Patient will continue to benefit from skilled PT services to modify and progress therapeutic interventions, address functional mobility deficits, address ROM deficits, address strength deficits, analyze and address soft tissue restrictions, analyze and cue movement patterns, analyze and modify body mechanics/ergonomics and assess and modify postural abnormalities to attain remaining goals. []  See Plan of Care  []  See progress note/recertification  []  See Discharge Summary         Progress towards goals / Updated goals:  Long Term Goals:  To be accomplished in 6-8 weeks:               1. Pt will be able to drive without increase in symptoms               2. Pt will report at least a 50% reduction in muscle cramping               3. Pt will be able to self-manage care using updated HEP for improved independence               4. 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 Improved FOTO score to 69 or better to demonstrate improved function  Frequency / Duration: Patient to be seen 2 times per week for 6-8 weeks.       PLAN  []  Upgrade activities as tolerated     []  Continue plan of care  []  Update interventions per flow sheet       []  Discharge due to:_  []  Other:_      Maite Thomas PTA, [facility] 11/19/2019

## 2019-11-21 ENCOUNTER — HOSPITAL ENCOUNTER (OUTPATIENT)
Dept: PHYSICAL THERAPY | Age: 49
Discharge: HOME | End: 2019-11-21
Payer: COMMERCIAL

## 2019-11-21 PROCEDURE — 97110 THERAPEUTIC EXERCISES: CPT | Performed by: PHYSICAL THERAPIST

## 2019-11-21 PROCEDURE — 97140 MANUAL THERAPY 1/> REGIONS: CPT | Performed by: PHYSICAL THERAPIST

## 2019-11-21 NOTE — PROGRESS NOTES
PT DAILY TREATMENT NOTE - [facility] 2-15    Patient Name: Jesus Gamboa  Date:2019  : 1970  [x]  Patient  Verified  Payor: Dianna Andrews / Plan: Cristal Correia / Product Type: HMO /    In time: 1100a  Out time: 1150a  Total Treatment Time (min): 50  Total Timed Codes (min): 50  1:1 Treatment Time ( only): 40   Visit #:  3    Treatment Area: Right shoulder pain [M25.511]  Neck pain [M54.2]    SUBJECTIVE  Pain Level (0-10 scale): 3  Any medication changes, allergies to medications, adverse drug reactions, diagnosis change, or new procedure performed?: [x] No    [] Yes (see summary sheet for update)  Subjective functional status/changes:   [] No changes reported  Still feeling about the same. OBJECTIVE    25 min Therapeutic Exercise:  [x]? See flow sheet :   Rationale: increase ROM, increase strength and improve coordination to improve the patients ability to increase function abd mobilty     25 min Manual Therapy: STM/MFR B cervical paraspinals, UT, LS, manual cervical traction. Downsloping glides to C5-6, C6-7, and C7-T1 on L side. Concurrent downsloping glide to L side with R median nerve glide. Upsloping glides to lower cervical segments on R, particularly C5-6 and C6-7. Rationale: decrease pain, increase ROM, increase tissue extensibility and decrease trigger points to improve the patients ability to increase mobility                                                                  With   []? TE   []? TA   []? neuro   []? other: Patient Education: [x]? Review HEP    []? Progressed/Changed HEP based on:   []? positioning   []? body mechanics   []? transfers   []? heat/ice application    []? other:       Other Objective/Functional Measures: --        Pain Level (0-10 scale) post treatment: 3/10     ASSESSMENT/Changes in Function:   Some improved nerve glide mobility with concurrent downsloping glide to L side. Added cervical rotation w/ towel to HEP today.    Patient will continue to benefit from skilled PT services to modify and progress therapeutic interventions, address functional mobility deficits, address ROM deficits, address strength deficits, analyze and address soft tissue restrictions, analyze and cue movement patterns, analyze and modify body mechanics/ergonomics and assess and modify postural abnormalities to attain remaining goals. []? See Plan of Care  []? See progress note/recertification  []? See Discharge Summary         Progress towards goals / Updated goals:  Long Term Goals: To be accomplished in 6-8 weeks:               1. Pt will be able to drive without increase in symptoms               2. Pt will report at least a 50% reduction in muscle cramping NOT MET               3. Pt will be able to self-manage care using updated HEP for improved independence               4. 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  Improved FOTO score to 69 or better to demonstrate improved function      PLAN  [x]  Upgrade activities as tolerated     [x]  Continue plan of care  []  Update interventions per flow sheet       []  Discharge due to:_  []  Other:_      Graeme Vaughn, PT 11/21/2019

## 2019-11-25 ENCOUNTER — HOSPITAL ENCOUNTER (OUTPATIENT)
Dept: PHYSICAL THERAPY | Age: 49
Discharge: HOME | End: 2019-11-25
Payer: COMMERCIAL

## 2019-11-25 PROCEDURE — 97140 MANUAL THERAPY 1/> REGIONS: CPT | Performed by: PHYSICAL THERAPIST

## 2019-11-25 PROCEDURE — 97110 THERAPEUTIC EXERCISES: CPT | Performed by: PHYSICAL THERAPIST

## 2019-11-25 NOTE — PROGRESS NOTES
PT DAILY TREATMENT NOTE - [facility] 2-15    Patient Name: Oral Asa  Date:2019  : 1970  [x]  Patient  Verified  Payor: Tomás Alexis / Plan: David Ramos / Product Type: HMO /    In time: 526p  Out time:615p  Total Treatment Time (min): 49  Total Timed Codes (min): 49  1:1 Treatment Time ( only): 52   Visit #:  4    Treatment Area: Right shoulder pain [M25.511]  Neck pain [M54.2]    SUBJECTIVE  Pain Level (0-10 scale): 3  Any medication changes, allergies to medications, adverse drug reactions, diagnosis change, or new procedure performed?: [x] No    [] Yes (see summary sheet for update)  Subjective functional status/changes:   [] No changes reported  Doing okay today. Tired because he worked all night and all day today. Says that he thinks the stretches are helping somewhat. OBJECTIVE    19 min Therapeutic Exercise:  [x]? ? See flow sheet :   Rationale: increase ROM, increase strength and improve coordination to improve the patients ability to increase function abd mobilty     30 min Manual Therapy: STM/MFR B cervical paraspinals, UT, LS, manual cervical traction. Downsloping glides to C5-6, C6-7, and C7-T1 on L side. Concurrent downsloping glide to L side with R median nerve glide. Upsloping glides to lower cervical segments on R, particularly C5-6 and C6-7. Rationale: decrease pain, increase ROM, increase tissue extensibility and decrease trigger points to improve the patients ability to increase mobility                                                                  With   []?? TE   []?? TA   []?? neuro   []?? other: Patient Education: [x]? ? Review HEP    []? ? Progressed/Changed HEP based on:   []?? positioning   []? ? body mechanics   []? ? transfers   []? ? heat/ice application    []? ? other:       Other Objective/Functional Measures: --        Pain Level (0-10 scale) post treatment: 3/10     ASSESSMENT/Changes in Function:   Appears to be making slow progress.    Patient will continue to benefit from skilled PT services to modify and progress therapeutic interventions, address functional mobility deficits, address ROM deficits, address strength deficits, analyze and address soft tissue restrictions, analyze and cue movement patterns, analyze and modify body mechanics/ergonomics and assess and modify postural abnormalities to attain remaining goals.     []? ?  See Plan of Care  []? ?  See progress note/recertification  []? ?  See Discharge Summary     Progress towards goals / Updated goals:  Long Term Goals: To be accomplished in 6-8 weeks:               1. Pt will be able to drive without increase in symptoms               2. Pt will report at least a 50% reduction in muscle cramping NOT MET               3. Pt will be able to self-manage care using updated HEP for improved independence               4. 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  Improved FOTO score to 69 or better to demonstrate improved function      PLAN  [x]  Upgrade activities as tolerated     [x]  Continue plan of care  []  Update interventions per flow sheet       []  Discharge due to:_  []  Other:_      Ajit Minor, PT 11/25/2019

## 2019-12-02 ENCOUNTER — HOSPITAL ENCOUNTER (OUTPATIENT)
Dept: PHYSICAL THERAPY | Age: 49
Discharge: HOME | End: 2019-12-02
Payer: COMMERCIAL

## 2019-12-02 PROCEDURE — 97110 THERAPEUTIC EXERCISES: CPT | Performed by: PHYSICAL THERAPIST

## 2019-12-02 PROCEDURE — 97140 MANUAL THERAPY 1/> REGIONS: CPT | Performed by: PHYSICAL THERAPIST

## 2019-12-02 NOTE — PROGRESS NOTES
PT DAILY TREATMENT NOTE - [facility] -15    Patient Name: Russ Rachel  Date:2019  : 1970  [x]  Patient  Verified  Payor: Eligio Griffith / Plan: Josey Okeefe / Product Type: HMO /    In time: 4970L  Out time: 1120a  Total Treatment Time (min): 50  Total Timed Codes (min): 50  1:1 Treatment Time ( [address] only): 40   Visit #:  5    Treatment Area: Right shoulder pain [M25.511]  Neck pain [M54.2]    SUBJECTIVE  Pain Level (0-10 scale): 2  Any medication changes, allergies to medications, adverse drug reactions, diagnosis change, or new procedure performed?: [x] No    [] Yes (see summary sheet for update)  Subjective functional status/changes:   [] No changes reported  Doing better. Less frequency of the pain and cramping issues with the R UE.     OBJECTIVE    25 min Therapeutic Exercise:  [x]? ?? See flow sheet :   Rationale: increase ROM, increase strength and improve coordination to improve the patients ability to increase function abd mobilty     25 min Manual Therapy: STM/MFR B cervical paraspinals, UT, LS, manual cervical traction. Downsloping glides to C5-6, C6-7, and C7-T1 on L side. Concurrent downsloping glide to L side with R median nerve glide. Upsloping glides to lower cervical segments on R, particularly C5-6 and C6-7.    Rationale: decrease pain, increase ROM, increase tissue extensibility and decrease trigger points to improve the patients ability to increase mobility                                                                  With   []??? TE   []??? TA   []??? neuro   []? ?? other: Patient Education: [x]??? Review HEP    []? ?? Progressed/Changed HEP based on:   []??? positioning   []? ?? body mechanics   []? ?? transfers   []? ?? heat/ice application    []? ?? other:       Other Objective/Functional Measures: R rotation: 66deg, no pain       Pain Level (0-10 scale) post treatment: 3/10     ASSESSMENT/Changes in Function:   Improved neural mobility and improved cervical rotation today.  Making progress the last few weeks. Patient will continue to benefit from skilled PT services to modify and progress therapeutic interventions, address functional mobility deficits, address ROM deficits, address strength deficits, analyze and address soft tissue restrictions, analyze and cue movement patterns, analyze and modify body mechanics/ergonomics and assess and modify postural abnormalities to attain remaining goals.     []? ??  See Plan of Care  []? ??  See progress note/recertification  []? ??  See Discharge Summary     Progress towards goals / Updated goals:  Long Term Goals: To be accomplished in 6-8 weeks:               1. Pt will be able to drive without increase in symptoms               2. Pt will report at least a 50% reduction in muscle cramping NOT MET               3. Pt will be able to self-manage care using updated HEP for improved independence               4. 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 Improved FOTO score to 69 or better to demonstrate improved function        PLAN  [x]? Upgrade activities as tolerated     [x]? Continue plan of care  []? Update interventions per flow sheet       []? Discharge due to:_  []?   Other:_        Davidson Thomas, PT 12/2/2019

## 2019-12-04 ENCOUNTER — HOSPITAL ENCOUNTER (OUTPATIENT)
Dept: PHYSICAL THERAPY | Age: 49
Discharge: HOME | End: 2019-12-04
Payer: COMMERCIAL

## 2019-12-04 PROCEDURE — 97110 THERAPEUTIC EXERCISES: CPT | Performed by: PHYSICAL THERAPIST

## 2019-12-04 PROCEDURE — 97140 MANUAL THERAPY 1/> REGIONS: CPT | Performed by: PHYSICAL THERAPIST

## 2019-12-04 NOTE — PROGRESS NOTES
PT DAILY TREATMENT NOTE - [facility] 2-15    Patient Name: Yasir Anderson  Date:2019  : 1970  [x]  Patient  Verified  Payor: Clemente Goodpasture / Plan: Chicho Pitch / Product Type: HMO /    In time: 3017H  Out time: 1115a  Total Treatment Time (min): 45  Total Timed Codes (min): 40  1:1 Treatment Time ([address] only): 40   Visit #: 6    Treatment Area: Right shoulder pain [M25.511]  Neck pain [M54.2]    SUBJECTIVE  Pain Level (0-10 scale): 2  Any medication changes, allergies to medications, adverse drug reactions, diagnosis change, or new procedure performed?: [x] No    [] Yes (see summary sheet for update)  Subjective functional status/changes:   [] No changes reported  Concordant pain is doing better, but just some soreness today. Noticing some definite improvement over the last week and a half. OBJECTIVE    15 min Therapeutic Exercise:  [x]? ??? See flow sheet :   Rationale: increase ROM, increase strength and improve coordination to improve the patients ability to increase function abd mobilty     25 min Manual Therapy: STM/MFR B cervical paraspinals, UT, LS, manual cervical traction. Downsloping glides to C5-6, C6-7, and C7-T1 on L side. Concurrent downsloping glide to L side with R median nerve glide.  Upsloping glides to lower cervical segments on R, particularly C5-6 and C6-7.    Rationale: decrease pain, increase ROM, increase tissue extensibility and decrease trigger points to improve the patients ability to increase mobility                                                                  With   []???? TE   []???? TA   []???? neuro   []???? other: Patient Education: [x]???? Review HEP    []???? Progressed/Changed HEP based on:   []???? positioning   []???? body mechanics   []???? transfers   []???? heat/ice application    []???? other:       Other Objective/Functional Measures: --       Pain Level (0-10 scale) post treatment: 2/10     ASSESSMENT/Changes in Function:   Improved neural mobility today. Added in radial nerve glides to HEP. Patient will continue to benefit from skilled PT services to modify and progress therapeutic interventions, address functional mobility deficits, address ROM deficits, address strength deficits, analyze and address soft tissue restrictions, analyze and cue movement patterns, analyze and modify body mechanics/ergonomics and assess and modify postural abnormalities to attain remaining goals.     []????  See Plan of Care  []????  See progress note/recertification  []????  See Discharge Summary     Progress towards goals / Updated goals:  Long Term Goals: To be accomplished in 6-8 weeks:               1. Pt will be able to drive without increase in symptoms               2. Pt will report at least a 50% reduction in muscle cramping NOT MET               3. Pt will be able to self-manage care using updated HEP for improved independence               4. 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  Improved FOTO score to 69 or better to demonstrate improved function      PLAN  [x]  Upgrade activities as tolerated     [x]  Continue plan of care  []  Update interventions per flow sheet       []  Discharge due to:_  []  Other:_      Solange Bhandari, PT 12/4/2019

## 2019-12-10 ENCOUNTER — HOSPITAL ENCOUNTER (OUTPATIENT)
Dept: PHYSICAL THERAPY | Age: 49
Discharge: HOME | End: 2019-12-10
Payer: COMMERCIAL

## 2019-12-10 PROCEDURE — 97140 MANUAL THERAPY 1/> REGIONS: CPT | Performed by: PHYSICAL THERAPIST

## 2019-12-10 PROCEDURE — 97110 THERAPEUTIC EXERCISES: CPT | Performed by: PHYSICAL THERAPIST

## 2019-12-10 NOTE — PROGRESS NOTES
PT DAILY TREATMENT NOTE - [facility] 2-15    Patient Name: Checo Carmona  Date:12/10/2019  : 1970  [x]  Patient  Verified  Payor: Barbara Mott / Plan: Archana Cloud / Product Type: HMO /    In time:   Out time:   Total Treatment Time (min): 48  Total Timed Codes (min): 48  1:1 Treatment Time ( only): 40   Visit #:  7    Treatment Area: Right shoulder pain [M25.511]  Neck pain [M54.2]    SUBJECTIVE  Pain Level (0-10 scale): 3  Any medication changes, allergies to medications, adverse drug reactions, diagnosis change, or new procedure performed?: [x] No    [] Yes (see summary sheet for update)  Subjective functional status/changes:   [] No changes reported  Neck and shoulder are doing pretty well; having a pretty big HA. Seems to start on the back of his head on the R side. OBJECTIVE    30 min Therapeutic Exercise:  [x]? ???? See flow sheet :   Rationale: increase ROM, increase strength and improve coordination to improve the patients ability to increase function abd mobilty     18 min Manual Therapy: STM/MFR B cervical paraspinals, UT, LS, manual cervical traction. Downsloping glides to C5-6, C6-7, and C7-T1 on L side. [place] [facility]  Upsloping glides to lower cervical segments on R, particularly C5-6 and C6-7. Suboccipital (SO) release    Held today:  Concurrent downsloping glide to L side with R median nerve glide   Rationale: decrease pain, increase ROM, increase tissue extensibility and decrease trigger points to improve the patients ability to increase mobility                                                                  With   []????? TE   []????? TA   []????? neuro   []????? other: Patient Education: [x]????? Review HEP    []????? Progressed/Changed HEP based on:   []????? positioning   []????? body mechanics   []????? transfers   []????? heat/ice application    []????? other:       Other Objective/Functional Measures: --       Pain Level (0-10 scale) post treatment: 3/10     ASSESSMENT/Changes in Function:   SO release gave 90% relief for headache. Instructed patient to set up tennis ball in tube sock in order self-manage at home. Patient will continue to benefit from skilled PT services to modify and progress therapeutic interventions, address functional mobility deficits, address ROM deficits, address strength deficits, analyze and address soft tissue restrictions, analyze and cue movement patterns, analyze and modify body mechanics/ergonomics and assess and modify postural abnormalities to attain remaining goals.     []?????  See Plan of Care  []?????  See progress note/recertification  []?????  See Discharge Summary     Progress towards goals / Updated goals:  Long Term Goals: To be accomplished in 6-8 weeks:               1. Pt will be able to drive without increase in symptoms               2. Pt will report at least a 50% reduction in muscle cramping NOT MET               3. Pt will be able to self-manage care using updated HEP for improved independence               4. 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 Improved FOTO score to 69 or better to demonstrate improved function        PLAN  [x]? Upgrade activities as tolerated     [x]? Continue plan of care  []? Update interventions per flow sheet       []? Discharge due to:_  []?   Other:_        Solange Bhandari, PT 12/10/2019

## 2019-12-12 ENCOUNTER — HOSPITAL ENCOUNTER (OUTPATIENT)
Dept: PHYSICAL THERAPY | Age: 49
Discharge: HOME | End: 2019-12-12
Payer: COMMERCIAL

## 2019-12-12 PROCEDURE — 97110 THERAPEUTIC EXERCISES: CPT | Performed by: PHYSICAL THERAPIST

## 2019-12-12 PROCEDURE — 97140 MANUAL THERAPY 1/> REGIONS: CPT | Performed by: PHYSICAL THERAPIST

## 2019-12-12 NOTE — PROGRESS NOTES
PT DAILY TREATMENT NOTE - [facility] 2-15    Patient Name: Jabier Sit  Date:2019  : 1970  [x]  Patient  Verified  Payor: Christina Richard / Plan: Kimberlee Like / Product Type: HMO /    In time: 6010A  Out time: 1130a  Total Treatment Time (min): 55  Total Timed Codes (min): 55  1:1 Treatment Time ( [address] only): 45   Visit #: 8    Treatment Area: Right shoulder pain [M25.511]  Neck pain [M54.2]    SUBJECTIVE  Pain Level (0-10 scale): 2  Any medication changes, allergies to medications, adverse drug reactions, diagnosis change, or new procedure performed?: [x] No    [] Yes (see summary sheet for update)  Subjective functional status/changes:   [] No changes reported  Noticing about 50% improvement overall since beginning PT. Still having tightness and cramping, but they are better overall. OBJECTIVE    30 min Therapeutic Exercise:  [x]?????? See flow sheet :   Rationale: increase ROM, increase strength and improve coordination to improve the patients ability to increase function abd mobilty     25 min Manual Therapy: STM/MFR B cervical paraspinals, UT, LS, manual cervical traction. Downsloping glides to C5-6, C6-7, and C7-T1 on L side. Geofm Dowse  Upsloping glides to lower cervical segments on R, particularly C5-6 and C6-7. Suboccipital (SO) release     Held today:  Concurrent downsloping glide to L side with R median nerve glide   Rationale: decrease pain, increase ROM, increase tissue extensibility and decrease trigger points to improve the patients ability to increase mobility                                                                  With   []?????? TE   []?????? TA   []?????? neuro   []?????? other: Patient Education: [x]?????? Review HEP    []?????? Progressed/Changed HEP based on:   []?????? positioning   []?????? body mechanics   []?????? transfers   []?????? heat/ice application    []?????? other:       Other Objective/Functional Measures: --       Pain Level (0-10 scale) post treatment: 2/10     ASSESSMENT/Changes in Function:   []??????  See Plan of Care  [x]??????  See progress note/recertification  []??????  See Discharge Summary     PLAN  [x]? ?  Upgrade activities as tolerated     [x]? ?  Continue plan of care  []? ?  Update interventions per flow sheet       []? ?  Discharge due to:_  []??  Other:Anton Schwab, PT 12/12/2019

## 2019-12-12 NOTE — PROGRESS NOTES
[facility]   [address] [address], [address] [place] [address], [address]  Phone: [phone] Fax: [phone]    Progress Note    Name: Madison Padilla   : 1970   MD: Danielito Rolon MD       Treatment Diagnosis: Right shoulder pain [M25.511]  Neck pain [M54.2]  Start of Care: 19    Visits from Start of Care: 8  Missed Visits: 0    Summary of Care: Mr. Bianka Burgess is making moderate progress since beginning PT. Still having cramping episodes, but are less frequent and painful. Overall reporting 50% improvement since beginning care. Will continue working on postural improvements and cervio-thoracic mobility to reduce symptoms. Long Term Goals: To be accomplished in 6-8 weeks:               1. Pt will be able to drive without increase in symptoms PROGRESSING               2. Pt will report at least a 50% reduction in muscle cramping PROGRESSING (30%)               3. Pt will be able to self-manage care using updated HEP for improved independence PROGRESSING               4. 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 Improved FOTO score to 69 or better to demonstrate improved function     Assessment / Recommendations: Other: Continue PT 1x/weekly for further 2-4 weeks. Zee Mauricio, PT 2019     ________________________________________________________________________  NOTE TO PHYSICIAN:  Please complete the following and fax to: [facility]: Fax: [phone] . [facility] [place] Retain this original for your records. If you are unable to process this request in 24 hours, please contact our office.        ____ I have read the above report and request that my patient continue therapy with the following changes/special instructions:  ____ I have read the above report and request that my patient be discharged from therapy    Physician's Signature:_________________ Date:___________Time:__________

## 2019-12-16 ENCOUNTER — HOSPITAL ENCOUNTER (OUTPATIENT)
Dept: PHYSICAL THERAPY | Age: 49
Discharge: HOME | End: 2019-12-16
Payer: COMMERCIAL

## 2019-12-16 PROCEDURE — 97140 MANUAL THERAPY 1/> REGIONS: CPT | Performed by: PHYSICAL THERAPIST

## 2019-12-16 PROCEDURE — 97110 THERAPEUTIC EXERCISES: CPT | Performed by: PHYSICAL THERAPIST

## 2019-12-16 NOTE — PROGRESS NOTES
PT DAILY TREATMENT NOTE - [facility] 2-15    Patient Name: Harjeet Schwab  Date:2019  : 1970  [x]  Patient  Verified  Payor: Devi Garcia / Plan: Erika Garza / Product Type: HMO /    In time: 9001S  Out time: 1150a  Total Treatment Time (min): 45  Total Timed Codes (min): 45  1:1 Treatment Time ([address] only): 30   Visit #:  9    Treatment Area: Right shoulder pain [M25.511]  Neck pain [M54.2]    SUBJECTIVE  Pain Level (0-10 scale): 2  Any medication changes, allergies to medications, adverse drug reactions, diagnosis change, or new procedure performed?: [x] No    [] Yes (see summary sheet for update)  Subjective functional status/changes:   [] No changes reported  Was mostly pain-free over the weekend, which was an improvement. OBJECTIVE    25 min Therapeutic Exercise:  [x]??????? See flow sheet :   Rationale: increase ROM, increase strength and improve coordination to improve the patients ability to increase function abd mobilty     20 min Manual Therapy: STM/MFR B cervical paraspinals, UT, LS, manual cervical traction. Downsloping glides to C5-6, C6-7, and C7-T1 on L side. Tresea Demar Upsloping glides to lower cervical segments on R, particularly C5-6 and C6-7. Suboccipital (SO) release. Prone thoracic P-A mobs T4-10     Held today:  Concurrent downsloping glide to L side with R median nerve glide   Rationale: decrease pain, increase ROM, increase tissue extensibility and decrease trigger points to improve the patients ability to increase mobility          With   [] TE   [] TA   [] neuro   [] other: Patient Education: [x] Review HEP    [] Progressed/Changed HEP based on:   [] positioning   [] body mechanics   [] transfers   [] heat/ice application    [] other:      Other Objective/Functional Measures: --     Pain Level (0-10 scale) post treatment: 2    ASSESSMENT/Changes in Function:   Discussed self-managing his care over the next 2 weeks during the holidays. Will f/u in 2 weeks .   Patient will continue to benefit from skilled PT services to modify and progress therapeutic interventions, address functional mobility deficits, address ROM deficits, address strength deficits, analyze and address soft tissue restrictions, analyze and cue movement patterns and assess and modify postural abnormalities to attain remaining goals. []  See Plan of Care  []  See progress note/recertification  []  See Discharge Summary         Progress towards goals / Updated goals:  Long Term Goals: To be accomplished in 6-8 weeks:               1. Pt will be able to drive without increase in symptoms PROGRESSING               2. Pt will report at least a 50% reduction in muscle cramping PROGRESSING (30%)               3. Pt will be able to self-manage care using updated HEP for improved independence PROGRESSING               4. 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  Improved FOTO score to 69 or better to demonstrate improved function     PLAN  [x]  Upgrade activities as tolerated     [x]  Continue plan of care  []  Update interventions per flow sheet       []  Discharge due to:_  []  Other:_      Zee Failing, PT 12/16/2019

## 2019-12-31 ENCOUNTER — HOSPITAL ENCOUNTER (OUTPATIENT)
Dept: PHYSICAL THERAPY | Age: 49
Discharge: HOME | End: 2019-12-31
Payer: COMMERCIAL

## 2019-12-31 PROCEDURE — 97140 MANUAL THERAPY 1/> REGIONS: CPT | Performed by: PHYSICAL THERAPIST

## 2019-12-31 PROCEDURE — 97110 THERAPEUTIC EXERCISES: CPT | Performed by: PHYSICAL THERAPIST

## 2019-12-31 NOTE — PROGRESS NOTES
PT DAILY TREATMENT NOTE - [facility] 2-15    Patient Name: Gold Gutierrez  Date:2019  : 1970  [x]  Patient  Verified  Payor: Jorge York / Plan: Donna Simeon / Product Type: HMO /    In time: 1110a  Out time: 1200p  Total Treatment Time (min): 50  Total Timed Codes (min): 50  1:1 Treatment Time ([facility] only): 38   Visit #: 10    Treatment Area: Right shoulder pain [M25.511]  Neck pain [M54.2]    SUBJECTIVE  Pain Level (0-10 scale): 2  Any medication changes, allergies to medications, adverse drug reactions, diagnosis change, or new procedure performed?: [x] No    [] Yes (see summary sheet for update)  Subjective functional status/changes:   [] No changes reported  Doing better, at least 50% better since last visit. Is getting a little bit of shoulder-specific pain the last few days. Is doing better with driving without pain. OBJECTIVE    35 min Therapeutic Exercise:  [x]???????? See flow sheet :   Rationale: increase ROM, increase strength and improve coordination to improve the patients ability to increase function abd mobilty     15 min Manual Therapy: STM/MFR B cervical paraspinals, UT, LS, manual cervical traction. Downsloping glides to C5-6, C6-7, and C7-T1 on L side. Adam Mcdonnell Upsloping glides to lower cervical segments on R, particularly C5-6 and C6-7. Suboccipital (SO) release. Prone thoracic P-A mobs T4-10     Held today:  Concurrent downsloping glide to L side with R median nerve glide   Rationale: decrease pain, increase ROM, increase tissue extensibility and decrease trigger points to improve the patients ability to increase mobility                                                                 With   []? TE   []? TA   []? neuro   []? other: Patient Education: [x]? Review HEP    []? Progressed/Changed HEP based on:   []? positioning   []? body mechanics   []? transfers   []? heat/ice application    []?  other:       Other Objective/Functional Measures: --        Pain Level (0-10 scale) post treatment: 2     ASSESSMENT/Changes in Function:   Maintaining overall progress since last session. Will continue to progress per tolerance. Patient will continue to benefit from skilled PT services to modify and progress therapeutic interventions, address functional mobility deficits, address ROM deficits, address strength deficits, analyze and address soft tissue restrictions, analyze and cue movement patterns and assess and modify postural abnormalities to attain remaining goals. []? See Plan of Care  []? See progress note/recertification  []? See Discharge Summary         Progress towards goals / Updated goals:  Long Term Goals: To be accomplished in 6-8 weeks:               1. Pt will be able to drive without increase in symptoms PROGRESSING               2. Pt will report at least a 50% reduction in muscle cramping PROGRESSING (30%)               3. Pt will be able to self-manage care using updated HEP for improved independence PROGRESSING               4. 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 Improved FOTO score to 69 or better to demonstrate improved function      PLAN  [x]? Upgrade activities as tolerated     [x]? Continue plan of care  []? Update interventions per flow sheet       []? Discharge due to:_  []?   Other:_      Yue Hoffmann, PT 12/31/2019

## 2020-01-02 ENCOUNTER — APPOINTMENT (OUTPATIENT)
Dept: PHYSICAL THERAPY | Age: 50
End: 2020-01-02
Payer: COMMERCIAL

## 2020-01-09 ENCOUNTER — HOSPITAL ENCOUNTER (OUTPATIENT)
Dept: PHYSICAL THERAPY | Age: 50
Discharge: HOME | End: 2020-01-09
Payer: COMMERCIAL

## 2020-01-09 PROCEDURE — 97016 VASOPNEUMATIC DEVICE THERAPY: CPT

## 2020-01-09 PROCEDURE — 97140 MANUAL THERAPY 1/> REGIONS: CPT

## 2020-01-09 NOTE — PROGRESS NOTES
PT DAILY TREATMENT NOTE - [facility] 2-15    Patient Name: Nola Dos Santos  Date:2020  : 1970  [x]  Patient  Verified  Payor: Rochelle Stuart / Plan: Ramona Del Valle / Product Type: HMO /    In time:  Out time: 7360  Total Treatment Time (min): 63  Total Timed Codes (min): 30  1:1 Treatment Time ( only): 30  Visit #: 11    Treatment Area: Right shoulder pain [M25.511]  Neck pain [M54.2]    SUBJECTIVE  Pain Level (0-10 scale): 4  Any medication changes, allergies to medications, adverse drug reactions, diagnosis change, or new procedure performed?: [x] No    [] Yes (see summary sheet for update)  Subjective functional status/changes:   [] No changes reported  Feels the neck is still progressing, had a few incidents of cramping but overall decreased.  Aggravate R shoulder in the last week then yesterday had training at work which made the shoulder more painful    OBJECTIVE      Modality rationale: decrease edema, decrease inflammation and decrease pain to improve the patients ability to lift and carry without pain   Min Type Additional Details       [] Estim: []Att   []Unatt    []TENS instruct                  []IFC  []Premod   []NMES                     []Other:  []w/US   []w/ice   []w/heat  Position:  Location:       []  Traction: [] Cervical       []Lumbar                       [] Prone          []Supine                       []Intermittent   []Continuous Lbs:  [] before manual  [] after manual  []w/heat    []  Ultrasound: []Continuous   [] Pulsed                       at: []1MHz   []3MHz Location:  W/cm2:    [] Paraffin         Location:   []w/heat    []  Ice     []  Heat  []  Ice massage Position:  Location:    []  Laser  []  Other: Position:  Location:     10 []  Vasopneumatic Device Pressure:       [x] lo [] med [] hi   Temperature: 34deg     [x] Skin assessment post-treatment:  [x]intact []redness- no adverse reaction    []redness  adverse reaction:     23 min Therapeutic Exercise:  [x]???????? See flow sheet :   Rationale: increase ROM, increase strength and improve coordination to improve the patients ability to increase function abd mobilty     30 min Manual Therapy: STM/MFR B cervical paraspinals, UT, LS, manual cervical traction. Downsloping glides to C5-6, C6-7, and C7-T1 on L side. .  particularly C5-6 and C6-7. Suboccipital (SO) release.       Rationale: decrease pain, increase ROM, increase tissue extensibility and decrease trigger points to improve the patients ability to increase mobility                                                                 With   []? TE   []? TA   []? neuro   []? other: Patient Education: [x]? Review HEP    []? Progressed/Changed HEP based on:   []? positioning   []? body mechanics   []? transfers   []? heat/ice application    []? other:       Other Objective/Functional Measures: --        Pain Level (0-10 scale) post treatment: 2     ASSESSMENT/Changes in Function:   Recent aggravation and irritation of R shoulder causing most pain today. Recommended rest and ice to reduce pain and inflammation. Still making progress with the neck and related symptoms. Patient will continue to benefit from skilled PT services to modify and progress therapeutic interventions, address functional mobility deficits, address ROM deficits, address strength deficits, analyze and address soft tissue restrictions, analyze and cue movement patterns and assess and modify postural abnormalities to attain remaining goals. []? See Plan of Care  []? See progress note/recertification  []? See Discharge Summary         Progress towards goals / Updated goals:  Long Term Goals: To be accomplished in 6-8 weeks:               1.  Pt will be able to drive without increase in symptoms PROGRESSING               2. Pt will report at least a 50% reduction in muscle cramping PROGRESSING (30%)               3. Pt will be able to self-manage care using updated HEP for improved independence PROGRESSING               4. 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 Improved FOTO score to 69 or better to demonstrate improved function      PLAN  [x]? Upgrade activities as tolerated     [x]? Continue plan of care  []? Update interventions per flow sheet       []? Discharge due to:_  []?   Other:_      Juan J Pantoja, PT 1/9/2020

## 2020-01-13 ENCOUNTER — HOSPITAL ENCOUNTER (OUTPATIENT)
Dept: PHYSICAL THERAPY | Age: 50
Discharge: HOME | End: 2020-01-13
Payer: COMMERCIAL

## 2020-01-13 PROCEDURE — 97110 THERAPEUTIC EXERCISES: CPT

## 2020-01-13 PROCEDURE — 97140 MANUAL THERAPY 1/> REGIONS: CPT

## 2020-01-13 NOTE — PROGRESS NOTES
PT DAILY TREATMENT NOTE - [facility] 2-15    Patient Name: Srinivas Lara  Date:2020  : 1970  [x]  Patient  Verified  Payor: BLUE AMAN / Plan: Ama Patricia [id number] PPO / Product Type: PPO /    In time:  1430 Out time: 1520  Total Treatment Time (min): 50  Total Timed Codes (min): 41  1:1 Treatment Time ( only): 41  Visit #: 12    Treatment Area: Right shoulder pain [M25.511]  Neck pain [M54.2]    SUBJECTIVE  Pain Level (0-10 scale): 2  Any medication changes, allergies to medications, adverse drug reactions, diagnosis change, or new procedure performed?: [x] No    [] Yes (see summary sheet for update)  Subjective functional status/changes:   [] No changes reported  AC joint is still irritated but better than last week.  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    OBJECTIVE      Modality rationale: decrease edema, decrease inflammation and decrease pain to improve the patients ability to lift and carry without pain   Min Type Additional Details       [] Estim: []Att   []Unatt    []TENS instruct                  []IFC  []Premod   []NMES                     []Other:  []w/US   []w/ice   []w/heat  Position:  Location:       []  Traction: [] Cervical       []Lumbar                       [] Prone          []Supine                       []Intermittent   []Continuous Lbs:  [] before manual  [] after manual  []w/heat    []  Ultrasound: []Continuous   [] Pulsed                       at: []1MHz   []3MHz Location:  W/cm2:    [] Paraffin         Location:   []w/heat   To go [x]  Ice     []  Heat  []  Ice massage Position:  Location: R shoulder    []  Laser  []  Other: Position:  Location:      []  Vasopneumatic Device Pressure:       [x] lo [] med [] hi   Temperature: 34deg     [x] Skin assessment post-treatment:  [x]intact []redness- no adverse reaction    []redness  adverse reaction:     30 min Therapeutic Exercise:  [x]???????? See flow sheet :   Rationale: increase ROM, increase strength and improve coordination to improve the patients ability to increase function abd mobilty     20 min Manual Therapy: STM/MFR B cervical paraspinals, UT, LS, R AC joint, pec mms, manual cervical traction. Downsloping glides to C5-6, C6-7, and C7-T1 on L side. .  particularly C5-6 and C6-7.       Rationale: decrease pain, increase ROM, increase tissue extensibility and decrease trigger points to improve the patients ability to increase mobility                                                                 With   []? TE   []? TA   []? neuro   []? other: Patient Education: [x]? Review HEP    []? Progressed/Changed HEP based on:   []? positioning   []? body mechanics   []? transfers   []? heat/ice application    []? other:       Other Objective/Functional Measures: --        Pain Level (0-10 scale) post treatment: 2     ASSESSMENT/Changes in Function:   Shoulder and AC joint is improving. Occurrence of pec and RUE spasms and cramping still sporadic. Recommended consult with a neurologist and patient agrees. Patient will continue to benefit from skilled PT services to modify and progress therapeutic interventions, address functional mobility deficits, address ROM deficits, address strength deficits, analyze and address soft tissue restrictions, analyze and cue movement patterns and assess and modify postural abnormalities to attain remaining goals. []? See Plan of Care  []? See progress note/recertification  []? See Discharge Summary         Progress towards goals / Updated goals:  Long Term Goals: To be accomplished in 6-8 weeks:               1.  Pt will be able to drive without increase in symptoms PROGRESSING               2. Pt will report at least a 50% reduction in muscle cramping PROGRESSING (30%)               3. Pt will be able to self-manage care using updated HEP for improved independence PROGRESSING               4. 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 Improved FOTO score to 69 or better to demonstrate improved function      PLAN  [x]? Upgrade activities as tolerated     [x]? Continue plan of care  []? Update interventions per flow sheet       []? Discharge due to:_  []?   Other:_      Rosaline Veloz, PT 1/13/2020

## 2020-01-15 ENCOUNTER — HOSPITAL ENCOUNTER (OUTPATIENT)
Dept: PHYSICAL THERAPY | Age: 50
Discharge: HOME | End: 2020-01-15
Payer: COMMERCIAL

## 2020-01-15 PROCEDURE — 97110 THERAPEUTIC EXERCISES: CPT

## 2020-01-15 PROCEDURE — 97140 MANUAL THERAPY 1/> REGIONS: CPT

## 2020-01-15 NOTE — PROGRESS NOTES
PT DAILY TREATMENT NOTE - [facility] 2-15    Patient Name: Danny Silva  Date:1/15/2020  : 1970  [x]  Patient  Verified  Payor: BLUE CROSS / Plan: Ama Patricia 5747 PPO / Product Type: PPO /    In time:  1500 Out time: 1550  Total Treatment Time (min): 50  Total Timed Codes (min): 30  1:1 Treatment Time ( only): 30  Visit #: 13    Treatment Area: Right shoulder pain [M25.511]  Neck pain [M54.2]    SUBJECTIVE  Pain Level (0-10 scale):  2  Any medication changes, allergies to medications, adverse drug reactions, diagnosis change, or new procedure performed?: [x] No    [] Yes (see summary sheet for update)  Subjective functional status/changes:   [] No changes reported  Sore after last session    OBJECTIVE      Modality rationale: decrease edema, decrease inflammation and decrease pain to improve the patients ability to lift and carry without pain   Min Type Additional Details       [] Estim: []Att   []Unatt    []TENS instruct                  []IFC  []Premod   []NMES                     []Other:  []w/US   []w/ice   []w/heat  Position:  Location:       []  Traction: [] Cervical       []Lumbar                       [] Prone          []Supine                       []Intermittent   []Continuous Lbs:  [] before manual  [] after manual  []w/heat    []  Ultrasound: []Continuous   [] Pulsed                       at: []1MHz   []3MHz Location:  W/cm2:    [] Paraffin         Location:   []w/heat   declined today [x]  Ice     []  Heat  []  Ice massage Position:  Location: R shoulder    []  Laser  []  Other: Position:  Location:      []  Vasopneumatic Device Pressure:       [x] lo [] med [] hi   Temperature: 34deg     [x] Skin assessment post-treatment:  [x]intact []redness- no adverse reaction    []redness  adverse reaction:     35 min Therapeutic Exercise:  [x]???????? See flow sheet :   Rationale: increase ROM, increase strength and improve coordination to improve the patients ability to increase function abd mobilty     15 min Manual Therapy: STM/MFR B cervical paraspinals, UT, LS, R AC joint, pec mms, manual cervical traction. Downsloping glides to C5-6, C6-7, and C7-T1 on L side. .  particularly C5-6 and C6-7.  Scapular mobs in sidelying      Rationale: decrease pain, increase ROM, increase tissue extensibility and decrease trigger points to improve the patients ability to increase mobility                                                                 With   [x]? TE   []? TA   []? neuro   []? other: Patient Education: [x]? Review HEP    []? Progressed/Changed HEP based on:   []? positioning   []? body mechanics   []? transfers   []? heat/ice application    []? other:       Other Objective/Functional Measures: --        Pain Level (0-10 scale) post treatment: 2     ASSESSMENT/Changes in Function:   Patient tolerating session today with no cramping. Still having pain and irritation in the [facility] joint and RTC. Patient will continue to benefit from skilled PT services to modify and progress therapeutic interventions, address functional mobility deficits, address ROM deficits, address strength deficits, analyze and address soft tissue restrictions, analyze and cue movement patterns and assess and modify postural abnormalities to attain remaining goals. []? See Plan of Care  []? See progress note/recertification  []? See Discharge Summary         Progress towards goals / Updated goals:  Long Term Goals: To be accomplished in 6-8 weeks:               1. Pt will be able to drive without increase in symptoms PROGRESSING               2. Pt will report at least a 50% reduction in muscle cramping PROGRESSING (30%)               3. Pt will be able to self-manage care using updated HEP for improved independence PROGRESSING               4. 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  Improved FOTO score to 69 or better to demonstrate improved function    PLAN  [x]? Upgrade activities as tolerated     [x]? Continue plan of care  []? Update interventions per flow sheet       []? Discharge due to:_  []?   Other:_      Dorothy Olson, PT 1/15/2020

## 2020-01-21 ENCOUNTER — APPOINTMENT (OUTPATIENT)
Dept: PHYSICAL THERAPY | Age: 50
End: 2020-01-21
Payer: COMMERCIAL

## 2020-01-28 ENCOUNTER — HOSPITAL ENCOUNTER (OUTPATIENT)
Dept: PHYSICAL THERAPY | Age: 50
Discharge: HOME | End: 2020-01-28
Payer: COMMERCIAL

## 2020-01-28 PROCEDURE — 97110 THERAPEUTIC EXERCISES: CPT | Performed by: PHYSICAL THERAPIST

## 2020-01-28 PROCEDURE — 97140 MANUAL THERAPY 1/> REGIONS: CPT | Performed by: PHYSICAL THERAPIST

## 2020-01-28 NOTE — PROGRESS NOTES
PT DAILY TREATMENT NOTE - [facility] 2-15    Patient Name: Connor Calhoun  Date:2020  : 1970  [x]  Patient  Verified  Payor: BLUE CROSS / Plan: [facility] PPO / Product Type: PPO /    In time:1202p  Out time: 1253p  Total Treatment Time (min): 51  Total Timed Codes (min): 51  1:1 Treatment Time ( only): 51   Visit #: 14    Treatment Area: Right shoulder pain [M25.511]  Neck pain [M54.2]    SUBJECTIVE  Pain Level (0-10 scale): \"1.78\"  Any medication changes, allergies to medications, adverse drug reactions, diagnosis change, or new procedure performed?: [x] No    [] Yes (see summary sheet for update)  Subjective functional status/changes:   [] No changes reported  AC joint pain is feeling better overall. Still reporting the cramping sensation in the UE. Says that he feels that is 25-30% reduced overall since beginning PT.     OBJECTIVE    35 min Therapeutic Exercise:  [x]????????? See flow sheet :review, updating HEP   Rationale: increase ROM, increase strength and improve coordination to improve the patients ability to increase function abd mobilty     16 min Manual Therapy: STM/MFR B cervical paraspinals, UT, LS, , SO release. manual cervical traction. Downsloping glides to C5-6, C6-7, and C7-T1 on L side. .  particularly C5-6 and C6-7. Cervical P-A mobs C5-7   Rationale: decrease pain, increase ROM, increase tissue extensibility and decrease trigger points to improve the patients ability to increase mobility     With   [x]? ? TE   []?? TA   []?? neuro   []?? other: Patient Education: [x]? ? Review HEP    []? ? Progressed/Changed HEP based on:   []?? positioning   []? ? body mechanics   []? ? transfers   []? ? heat/ice application    []? ? other:       Other Objective/Functional Measures: --        Pain Level (0-10 scale) post treatment: 1     ASSESSMENT/Changes in Function:      []? ?  See Plan of Care  [x]? ?  See progress note/recertification  []? ?[facility] Discharge Summary     PLAN  []??  Upgrade activities as tolerated     []? ?  Continue plan of care  []? ?  Update interventions per flow sheet       []? ?  Discharge due to:_  [x]? ?  Other: PT on hold, d/c if no return in 4 weeks         Tj Mcghee, PT 1/28/2020

## 2020-01-28 NOTE — PROGRESS NOTES
[facility]   [address] [address], [address] [place] [address]  Phone: [phone] Fax: [phone]    Progress Note    Name: Cecelia Singer   : 1970   MD: Cesar Thornton MD       Treatment Diagnosis: Right shoulder pain [M25.511]  Neck pain [M54.2]  Start of Care: 19    Visits from Start of Care: 14  Missed Visits: 0    Summary of Care: Mr. Nba Mendiola has made moderate progress over the course of PT. He is reporting reduced neck pain and 30% reduction in UE cramping. He has scheduled a Neurology appointment with Dr. Sue Koo in April. He has been working better with self-management of care, and will allow him to continue on his own as progress with PT has plateaued recently. Requested he follow up with Dr. Travis Blakely in the next 2-3 weeks for re-assessment. Long Term Goals: To be accomplished in 6-8 weeks:               1. Pt will be able to drive without increase in symptoms PROGRESSING               2. Pt will report at least a 50% reduction in muscle cramping PROGRESSING (30%)               3. Pt will be able to self-manage care using updated HEP for improved independence PROGRESSING               4. 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 Improved FOTO score to 69 or better to demonstrate improved function     Assessment / Recommendations:     Hold therapy and await physician's recommendations. Please advise. Blane Gaytan, PT 2020     ________________________________________________________________________  NOTE TO PHYSICIAN:  Please complete the following and fax to: [facility]: Fax: [phone] . Garth Vogel Retain this original for your records. If you are unable to process this request in 24 hours, please contact our office.        ____ I have read the above report and request that my patient continue therapy with the following changes/special instructions:  ____ I have read the above report and request that my patient be discharged from therapy    Physician's Signature:_________________ Date:___________Time:__________

## 2020-02-18 ENCOUNTER — OFFICE VISIT (OUTPATIENT)
Dept: INTERNAL MEDICINE CLINIC | Age: 50
End: 2020-02-18

## 2020-02-18 VITALS
SYSTOLIC BLOOD PRESSURE: 100 MMHG | HEART RATE: 66 BPM | DIASTOLIC BLOOD PRESSURE: 64 MMHG | OXYGEN SATURATION: 97 % | BODY MASS INDEX: 31.43 KG/M2 | WEIGHT: 195.6 LBS | HEIGHT: 66 IN | RESPIRATION RATE: 16 BRPM

## 2020-02-18 DIAGNOSIS — E78.00 HYPERCHOLESTEREMIA: ICD-10-CM

## 2020-02-18 DIAGNOSIS — S46.011D TRAUMATIC INCOMPLETE TEAR OF RIGHT ROTATOR CUFF, SUBSEQUENT ENCOUNTER: Primary | ICD-10-CM

## 2020-02-18 NOTE — PROGRESS NOTES
Chief Complaint   Patient presents with    Shoulder Pain     he is a 52y.o. year old male who presents for follow up of injury. Follow Up Pain Assessment Encounter      Onset of Symptoms: a couple years  ________________________________________________________________________  Description: Pain is now better but still not 100%    Pain Scale:(1-10): 6  Duration:  continuous  Radiation: down the arm   What makes it better?: OTC meds and rest  What makes it worse?:use and certain movements  Medications tried: acetaminophen, ibuprofen  Modalities tried: PT         Reviewed and agree with Nurse Note and duplicated in this note. Reviewed PmHx, RxHx, FmHx, SocHx, AllgHx and updated and dated in the chart. Family History   Problem Relation Age of Onset    Hypertension Mother     Arthritis-osteo Mother     High Cholesterol Mother     Cancer Father         melanoma, ?lung       Past Medical History:   Diagnosis Date    Anxiety state, unspecified     Basal cell carcinoma     Calculus of kidney 8/17/2014    Left 6mm. Left 1 mm. Dr. Ling Alcala. 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 Dr. Francis Pereyra.  Other and unspecified hyperlipidemia     Shin splints 04/24/08    Dr. Vineet Chang. [facility] Equines    Shoulder pain 05/16/01    Right. Tendinitis. Dr. Laguna Juan    Tinnitus 2007    Right. Dr. Jeanie Oconnor History     Socioeconomic History    Marital status: SINGLE     Spouse name: Not on file    Number of children: Not on file    Years of education: Not on file    Highest education level: Not on file   Tobacco Use    Smoking status: Current Every Day Smoker     Packs/day: 0.50     Years: 24.00     Pack years: 12.00     Types: Cigarettes    Smokeless tobacco: Never Used    Tobacco comment: Vapor   Substance and Sexual Activity    Alcohol use:  Yes     Alcohol/week: 5.0 - 8.3 standard drinks     Types: 6 - 10 Cans of beer per week    Drug use: No    Sexual activity: Yes     Partners: Female        Review of Systems - negative except as listed above      Objective:     Vitals:    02/18/20 1332   BP: 100/64   Pulse: 66   Resp: 16   SpO2: 97%   Weight: 195 lb 9.6 oz (88.7 kg)   Height: 5' 6\" (1.676 m)       Physical Examination:  General appearance - alert, well appearing, and in no distress  Neurological - alert, oriented, normal speech, no focal findings or movement disorder noted  Musculoskeletal - The right shoulder is  normal to inspection. The patient has full range of motion  . The shoulderis tender to palpation laterally. Bicep tendon: non-tender  The NEER test is negative. The Church test:is positive   The Cross over test:is  negative. The Empty Can test:is  negative. Stressed ext rotation is:  negative. Stressed int rotation: negative. The Apprehension Sign is negative. The Lift off test is:  negative   Examination reveals the Painful Arch:  negative. The Labral Test is:  negative. The Sulcus Sign is:  negative. Extremities - peripheral pulses normal, no pedal edema, no clubbing or cyanosis  Skin - normal coloration and turgor, no rashes, no suspicious skin lesions noted    Assessment/ Plan:   Diagnoses and all orders for this visit:    1. Traumatic incomplete tear of right rotator cuff, subsequent encounter  -     MRI SHOULDER RT WO CONT; Future    2.  Hypercholesteremia  -     LIPID PANEL       Patient has failed physical therapy and continues have pain along the subscap, MRI to rule out further subscap tear    Pathophysiology, recovery and rehabilitation process discussed and questions answered   Counseling for 30 Minutes of the total visit duration   Pictures and figures used as necessary   Provided reassurance   Monitor response to home exercises   Recommend activity modification   Recommend  lower impact activities-walking, Eliptical, Nordic Track, cycling or swimming   Follow up in 4 week(s)            I have discussed the diagnosis with the patient and the intended plan as seen in the above orders. The patient has received an after-visit summary and questions were answered concerning future plans. Medication Side Effects and Warnings were discussed with patient,  Patient Labs were reviewed and or requested, and  Patient Past Records were reviewed and or requested  yes     Pt agrees to call or return to clinic and/or go to closest ER with any worsening of symptoms. This may include, but not limited to increased fever (>100.4) with NSAIDS or Tylenol, increased edema, confusion, rash, worsening of presenting symptoms.

## 2020-02-19 LAB
CHOLEST SERPL-MCNC: 219 MG/DL (ref 100–199)
HDLC SERPL-MCNC: 33 MG/DL
LDLC SERPL CALC-MCNC: 167 MG/DL (ref 0–99)
TRIGL SERPL-MCNC: 97 MG/DL (ref 0–149)
VLDLC SERPL CALC-MCNC: 19 MG/DL (ref 5–40)

## 2020-02-19 NOTE — PROGRESS NOTES
Your cholesterol has gone up, I recommend starting the Crestor and repeating your blood work in 3 months

## 2020-02-24 ENCOUNTER — HOSPITAL ENCOUNTER (OUTPATIENT)
Dept: MRI IMAGING | Age: 50
Discharge: HOME | End: 2020-02-24
Attending: FAMILY MEDICINE
Payer: COMMERCIAL

## 2020-02-24 ENCOUNTER — DOCUMENTATION ONLY (OUTPATIENT)
Dept: INTERNAL MEDICINE CLINIC | Age: 50
End: 2020-02-24

## 2020-02-24 DIAGNOSIS — S46.011D TRAUMATIC INCOMPLETE TEAR OF RIGHT ROTATOR CUFF, SUBSEQUENT ENCOUNTER: ICD-10-CM

## 2020-02-24 PROCEDURE — 73221 MRI JOINT UPR EXTREM W/O DYE: CPT

## 2020-02-24 NOTE — PROGRESS NOTES
AC joint arthritis is worsened since last MRI. No full-thickness tear but you do have an unchanged small tear and the rotator cuff muscle on the front and shoulder.   PRP could be an option for this as well as continued physical therapy

## 2020-03-11 NOTE — ANCILLARY DISCHARGE INSTRUCTIONS
[facility] [address] [address], [address] Phone: [phone]  Fax: [phone] Discharge Summary  2-15 Patient name: Carleen Wells  : 1970  Provider#: [id number] Referral source: Cherri Councilman, MD     
Medical/Treatment Diagnosis: Right shoulder pain [M25.511] Neck pain [M54.2] Prior Hospitalization: see medical history Comorbidities: anxiety, hearing impaired, 
Prior Level of Function:chronic limitations Medications: Verified on Patient Summary List 
 
Start of Care: 19      Onset Date:10+ years ago Visits from Start of Care: 14     Missed Visits: 0 Reporting Period : 19 to 20 ASSESSMENT/SUMMARY OF CARE:  
Mr. Andrew Stover attended therapy from 2019 to 2020 for right sided neck pain and cramping. He was making moderate progress and as of last clinic visit decided to manage symptoms on his own with HEP. He has not called to make any more follow up appt. Below are his updated goals as of last session. Long Term Goals: To be accomplished in 6-8 weeks: 
             1. Pt will be able to drive without increase in symptoms PROGRESSING 
             2. Pt will report at least a 50% reduction in muscle cramping PROGRESSING (30%) 
             3. Pt will be able to self-manage care using updated HEP for improved independence PROGRESSING 
             4. 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 
             5. Improved FOTO score to 69 or better to demonstrate improved function  - not addressed 
  
 
 
RECOMMENDATIONS: 
[x]Discontinue therapy: []Patient has reached or is progressing toward set goals [x]Patient is non-compliant or has abdicated 
    []Due to lack of appreciable progress towards set goals Stephen Baker, PT 3/11/2020

## 2020-03-30 RX ORDER — ROSUVASTATIN CALCIUM 5 MG/1
5 TABLET, COATED ORAL
Qty: 30 TAB | Refills: 6 | Status: SHIPPED | OUTPATIENT
Start: 2020-03-30 | End: 2020-05-06 | Stop reason: SDUPTHER

## 2020-04-07 NOTE — PROGRESS NOTES
Neurology Note    Patient ID:  Stevenson Hilton  [id number]  45 y.o.  1970      Date of Consultation:  April 8, 2020    Referring Physician: Dr. Alaina Mason for Consultation:  Cramping    This is a telemedicine visit that was performed with in the originating site at patient's home and the distance site at [facility] outpatient clinic at [facility].  This telemedicine visit utilized synchronous (real-time) audio-video technology. Verbal consent to participate in the video visit was obtained. This visit occurred during the corona (COVID -19) public health emergency. I discussed with the patient the nature of our telemedicine visit, that:  - I would evaluate the patient and recommend diagnostic and treatment based on my assessment  - Our sessions are not being recorded and that personal health information is protected  - Our team will provide follow-up care in person if and when the patient needs it. Consent:  The patient is aware that this patient-initiated Telehealth encounter is a billable service, with coverage as determined by the patient's insurance carrier. The patient is aware that they may receive a bill and has provided verbal consent to proceed:     Subjective:       History of Present Illness:   Stevenson Hilton is a 52 y.o. male who was referred to the neurology clinic at [facility] for an evaluation. The patient states that he has had on and off neck pain for many years. It has been intermittent in regards to its severity and intensity but has been present. He did have imaging of his spine in the past and has been told that he has cervical spine degeneration which is worse at the C5 and C6 area. I do not have a copy of that for my review. He also does have a partial tear in his rotator cuff on his right side.     When has concerned him is that over the past 2 years he has developed twitching and cramping pain throughout his neck, shoulder, and right upper extremity. He will notice this twitching and cramping in his pectoralis muscle. He will also notice it in his triceps and in his latissimus dorsi. This can happen at rest or with physical activity. He also notices when he reaches behind himself to wipe after going to the bathroom. He has noticed a cramping in his forearm. He is also noticed intermittent leg cramps. He does feel that his right arm is also slightly weaker than his left. He feels that the definition of his tricep is not as precise. He did have  testing done recently for work. he does work for the fire department and there has been no change there. He tries to stay well-hydrated. He does limit his caffeine and alcohol utilization. He does feel that with physical activity that it does take him longer to recover. He has not noticed any specific changes in his vision. He did have Lasix surgery in the past and does wear readers but that is not new. He also feels that occasionally he has word finding difficulties and feels that he is in a brain fog. He is a bit more forgetful than in the past.  He was just started on a cholesterol-lowering medicine but this has not worsened his symptoms. He feels that he does suffer from anxiety more now than he did in the past.  Other symptoms that he wanted to mention is that he does have tinnitus in his right ear. He also does feel that his shoulder will pop on him. He is also noticing worsening headaches on his right side which he never got before. These headaches originate in the neck area and radiate into his head. Past Medical History:   Diagnosis Date    Anxiety state, unspecified     Basal cell carcinoma     Calculus of kidney 8/17/2014    Left 6mm. Left 1 mm. Dr. Felipe Monaco. 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 Dr. Selena Espinosa.  Other and unspecified hyperlipidemia     Shin splints 04/24/08    Dr. Alvaro Wade.   [address] [facility] Shoulder pain 05/16/01    Right. Tendinitis. Dr. Lila Dela Cruz    Tinnitus 2007    Right. Dr. Cristina Mims        Past Surgical History:   Procedure Laterality Date    HX LITHOTRIPSY  08/2014    Left. Dr. Jass Dorantes.  HX OTHER SURGICAL  1996    birth lizzeth removed from Rt buttock    HX REFRACTIVE SURGERY  12/2005    HX SKIN BIOPSY  2012    Dr. Joe Copeland from left back of leg    HX TONSILLECTOMY      HX UROLOGICAL  08/2014, 09/2014    Left KIDNEY STENT THEN REMOVAL. DUE TO KIDNEY STONES. Dr. German Situ.  HX WISDOM TEETH EXTRACTION      NERVE BLOCK  07/2010    C3-4, C4-5. Dr. Nadia Ramirez. Family History   Problem Relation Age of Onset    Hypertension Mother     Arthritis-osteo Mother     High Cholesterol Mother     Cancer Father         melanoma, ?lung        Social History     Tobacco Use    Smoking status: Former Smoker     Packs/day: 0.50     Years: 24.00     Pack years: 12.00     Types: Cigarettes    Smokeless tobacco: Current User    Tobacco comment: Vapor   Substance Use Topics    Alcohol use: Yes     Alcohol/week: 5.0 - 8.3 standard drinks     Types: 6 - 10 Cans of beer per week        Allergies   Allergen Reactions    Cymbalta [Duloxetine] Other (comments)     Erectile dysfunction      Prozac [Fluoxetine] Other (comments)     Decreased libido and erection        Prior to Admission medications    Medication Sig Start Date End Date Taking? Authorizing Provider   OTHER Using cbd oil for sleep   Yes Provider, Historical   rosuvastatin (CRESTOR) 5 mg tablet Take 1 Tab by mouth nightly. 3/30/20  Yes Francisco Austin MD   ibuprofen (MOTRIN) 200 mg tablet Take  by mouth. Yes Provider, Historical   pramoxine-hydrocortisone (ANALPRAM HC) 1-1 % topical cream Apply  to affected area three (3) times daily.  1/20/16  Yes Addison Check, NP       Review of Systems:    General, constitutional: negative  Eyes, vision: needs readers  Ears, nose, throat: negative  Cardiovascular, heart: negative  Respiratory: negative  Gastrointestinal: negative  Genitourinary: negative  Musculoskeletal: negative  Skin and integumentary: negative  Psychiatric: negative  Endocrine: negative  Neurological: negative, except for HPI  Hematologic/lymphatic: negative  Allergy/immunology: negative      Objective: There were no vitals taken for this visit. No vital signs were obtained via telemedicine today. There are limitations to the neurological examination due to the technological features of telemedicine    Physical Exam:      General:  appears well nourished in no acute distress  Respiratory:  Good  respiratory effort. No labored breathing  Skin: intact. No obvious erythematous rashes    Neurological exam:    Awake, alert, oriented to person, place and time  Recent and remote memory were normal  Attention and concentration were intact  Language was intact. There was no aphasia  Speech: no dysarthria  Fund of knowledge was preserved    Cranial nerves:     Visual fields were full  Eomi, no evidence of nystagmus  Facial motor: normal and symmetric  Hearing intact    Tongue: midline without fasciculations    Motor:   No evidence of fasciculations    Strength testing: upon examination, he is at least a 4+/5 thorughout    Sensory:  He denies sensory disturbance    Reflexes:  Unable to obtain via telemedicine    Cerebellar testing:  no tremor apparent, finger/nose and tico were intact    Romberg: absent    Gait: steady.      Labs:     Lab Results   Component Value Date/Time    Sodium 141 10/07/2019 02:49 PM    Potassium 4.1 10/07/2019 02:49 PM    Chloride 102 10/07/2019 02:49 PM    Glucose 87 10/07/2019 02:49 PM    BUN 13 10/07/2019 02:49 PM    Creatinine 0.97 10/07/2019 02:49 PM    Calcium 9.3 10/07/2019 02:49 PM    WBC 6.8 08/16/2018 08:57 AM    HCT 42.3 08/16/2018 08:57 AM    HGB 14.4 08/16/2018 08:57 AM    PLATELET 293 47/08/5678 08:57 AM       Imaging:    Results from Hospital Encounter encounter on 02/24/20 MRI SHOULDER RT WO CONT    Narrative EXAM: MRI SHOULDER RT WO CONT    INDICATION: Right shoulder pain. Subscapularis tear. COMPARISON: 2/22/2018    TECHNIQUE: Axial proton density fat-saturated; oblique coronal T1, T2  fat-saturated, and proton density fat-saturated; and oblique sagittal T2  fat-saturated MRI of the right shoulder . CONTRAST: None. FINDINGS: A.C. joint: Mild to moderate osteoarthritis with a mild joint effusion  and surrounding edema. There is a small synovial cyst superiorly measuring 5 mm. Anterior acromion process type: 2    Bone marrow: No acute fracture, dislocation, or marrow replacing process. Joint fluid: No significant joint effusion. Trace fluid in the subacromial and  subdeltoid bursa. Rotator cuff tendons: There is an unchanged small partial-thickness articular  sided tear in the superior fibers of the distal subscapularis measuring  approximately 3.5 mm in size. The remainder of the cuff is intact. Biceps tendon: Intact and located within the bicipital groove. Muscles: No edema or atrophy. Glenoid labrum: Intact. Glenohumeral joint capsule: within normal limits. Glenohumeral articular cartilage: Intact. No focal osteochondral lesion. Soft tissue mass: None. Impression IMPRESSION:   1. Interval development of mild to moderate acromioclavicular osteoarthritis  with significant surrounding edema, a mild joint effusion, and a small synovial  cyst superiorly. 3. Unchanged small partial-thickness articular sided tear in the superior fibers  of the distal subscapularis.        Results from [place] encounter on 06/12/19   CT ABD PELV WO CONT    Narrative INDICATION:  Unspecified abdominal pain  Flank pain, recurrent stone disease  suspected     EXAM: CT Abdomen and Pelvis without IV contrast. No oral contrast.  CT dose reduction was achieved through use of a standardized protocol tailored  for this examination and automatic exposure control for dose modulation. FINDINGS:   There is a punctate nonobstructing stone in each kidney. No other urinary tract  stones are seen. There is no hydroureteronephrosis. The kidneys are normal in  size. There is no perirenal fluid or ascites. Liver shows no apparent significant finding without contrast. Pancreas, adrenal  glands, spleen and aorta show no significant enlargement. There is mild  aortoiliac atherosclerotic calcification. There are a few colonic diverticula. No inflammation is seen. There is no  pneumoperitoneum or significant adenopathy. There is mild prostatomegaly. The bladder is not distended. The distal ureters  are not dilated. There is no apparent pelvic mass. The appendix is normal.  Bowels are nondilated. There is no significant stool. Impression IMPRESSION: No acute disease. Nonobstructive nephrolithiasis. I dependently reviewed the MRI of his shoulder which revealed a partial subscapularis tear. Upon review of his laboratory results his LDL was markedly elevated. I did review the physical therapy and primary care doctor notes, Dr. Segundo Allred, which are provided in our clinic care system. Assessment and Plan:    The patient is a pleasant 66-year-old gentleman with a multiple year history of progressive pain in his neck with cramping and stiffness with perceived weakness in his right upper extremity. His neurological examination, albeit limited via telemedicine reveals no clear evidence of weakness but subtle weakness could not be determined. Neck pain and cramping throughout predominantly the right upper extremity with progressive worsening: The differential for this does include a cervical radiculopathy, cramp fasciculation syndrome, metabolic abnormality, vitamin deficiency. I would like to start by getting an EMG/nerve conduction study of his right upper extremity.   I would like to test laboratory results looking for potential causes of cramping and metabolic abnormalities. This will include a thyroid, vitamin B12, vitamin D. I did place those orders today. Pending these results, he may need a follow-up MRI of his cervical spine. I discussed the rationale of this with him today. From a treatment standpoint, I would recommend starting magnesium supplementation at 400 mg twice a day to see if that will help him. I did discuss side effects of magnesium with him. I also talked to him about lifestyle modifications in regards to minimizing cramping. This would include ensuring hydration, decreasing caffeine utilization, improving sleep hygiene. Increasing headaches: The differential for this does include worsening cervical spine disease versus tension headaches. I would recommend using anti-inflammatories for the time being. Pending her response, other medications including muscle relaxants may need to be considered. Would also need to consider additional imaging of his cervical spine or brain pending the response. Concern over cognitive slowing: The differential for this does include pain induced, worsening anxiety, metabolic or vitamin abnormalities, mild cognitive impairment. I would like to start with trying to get his cramping pain under control as well as looking for metabolic or vitamin deficiencies. He should consider having a follow-up visit with his primary care doctor concerning anxiety. More formal testing may need to be considered.         Patient Active Problem List   Diagnosis Code    Vertigo R42    Allergic rhinitis J30.9    Shoulder pain M25.519    Neck pain M54.2    Other and unspecified hyperlipidemia E78.5    Depression F32.9    Tinnitus H93.19    Calculus of kidney N20.0    Recurrent depression ([facility] [address] F33.9             The patient should return to clinic in 3 months    Renewed medication: none today, but discussed magnesium    I spent     minutes with the patient  with over 50 % of the time counseling and coordinating the care plan in regards to the diagnosis, diagnostic testing, and treatment plan. The patient had the ability to ask questions and all questions were answered.            Signed By:  Marcin Ferrer DO FAAN    April 8, 2020

## 2020-04-08 ENCOUNTER — VIRTUAL VISIT (OUTPATIENT)
Dept: NEUROLOGY | Age: 50
End: 2020-04-08

## 2020-04-08 DIAGNOSIS — R20.9 DISTURBANCE OF SKIN SENSATION: Primary | ICD-10-CM

## 2020-04-08 DIAGNOSIS — M54.2 NECK PAIN: ICD-10-CM

## 2020-04-08 DIAGNOSIS — R25.2 MUSCLE CRAMPING: ICD-10-CM

## 2020-04-08 NOTE — PATIENT INSTRUCTIONS

## 2020-04-09 RX ORDER — ERGOCALCIFEROL 1.25 MG/1
50000 CAPSULE ORAL
Qty: 12 CAP | Refills: 2 | Status: SHIPPED | OUTPATIENT
Start: 2020-04-09 | End: 2020-07-08

## 2020-04-09 NOTE — PROGRESS NOTES
Andrea Gonzalez,    Your vitamin D level was low and you need to take prescription strength vitamin D. This can lead to muscle aches, fatigue, cramping. Let us get you started on that. I will send a prescription over to your pharmacy.     Sincerely,  Dr. Sina Elena

## 2020-04-10 LAB
25(OH)D3+25(OH)D2 SERPL-MCNC: 19.5 NG/ML (ref 30–100)
ALBUMIN SERPL ELPH-MCNC: 4.3 G/DL (ref 2.9–4.4)
ALBUMIN SERPL-MCNC: 4.8 G/DL (ref 4–5)
ALBUMIN/GLOB SERPL: 1.5 {RATIO} (ref 0.7–1.7)
ALBUMIN/GLOB SERPL: 2 {RATIO} (ref 1.2–2.2)
ALP SERPL-CCNC: 117 IU/L (ref 39–117)
ALPHA1 GLOB SERPL ELPH-MCNC: 0.2 G/DL (ref 0–0.4)
ALPHA2 GLOB SERPL ELPH-MCNC: 0.6 G/DL (ref 0.4–1)
ALT SERPL-CCNC: 30 IU/L (ref 0–44)
AST SERPL-CCNC: 24 IU/L (ref 0–40)
B-GLOBULIN SERPL ELPH-MCNC: 1.1 G/DL (ref 0.7–1.3)
BILIRUB SERPL-MCNC: 1 MG/DL (ref 0–1.2)
BUN SERPL-MCNC: 15 MG/DL (ref 6–24)
BUN/CREAT SERPL: 14 (ref 9–20)
CALCIUM SERPL-MCNC: 9.6 MG/DL (ref 8.7–10.2)
CHLORIDE SERPL-SCNC: 99 MMOL/L (ref 96–106)
CO2 SERPL-SCNC: 24 MMOL/L (ref 20–29)
CREAT SERPL-MCNC: 1.1 MG/DL (ref 0.76–1.27)
GAMMA GLOB SERPL ELPH-MCNC: 1 G/DL (ref 0.4–1.8)
GLOBULIN SER CALC-MCNC: 2.4 G/DL (ref 1.5–4.5)
GLOBULIN SER-MCNC: 2.9 G/DL (ref 2.2–3.9)
GLUCOSE SERPL-MCNC: 143 MG/DL (ref 65–99)
HBA1C MFR BLD: 5.6 % (ref 4.8–5.6)
IGA SERPL-MCNC: 124 MG/DL (ref 90–386)
IGG SERPL-MCNC: 978 MG/DL (ref 603–1613)
IGM SERPL-MCNC: 96 MG/DL (ref 20–172)
INTERPRETATION SERPL IEP-IMP: NORMAL
M PROTEIN SERPL ELPH-MCNC: NORMAL G/DL
PLEASE NOTE:, 149534: NORMAL
POTASSIUM SERPL-SCNC: 3.7 MMOL/L (ref 3.5–5.2)
PROT SERPL-MCNC: 7.2 G/DL (ref 6–8.5)
SODIUM SERPL-SCNC: 139 MMOL/L (ref 134–144)
T4 FREE SERPL-MCNC: 1.42 NG/DL (ref 0.82–1.77)
TSH SERPL DL<=0.005 MIU/L-ACNC: 1.1 UIU/ML (ref 0.45–4.5)
VIT B12 SERPL-MCNC: 440 PG/ML (ref 232–1245)

## 2020-04-21 ENCOUNTER — TELEPHONE (OUTPATIENT)
Dept: NEUROLOGY | Age: 50
End: 2020-04-21

## 2020-04-21 NOTE — TELEPHONE ENCOUNTER
Spoke to patient and results of labs relayed per Dr. Meehan    Patient picked up script for Linda D at pharmacy

## 2020-05-07 RX ORDER — ROSUVASTATIN CALCIUM 5 MG/1
5 TABLET, COATED ORAL
Qty: 30 TAB | Refills: 6 | Status: SHIPPED | OUTPATIENT
Start: 2020-05-07 | End: 2020-06-13 | Stop reason: SDUPTHER

## 2020-06-15 RX ORDER — ROSUVASTATIN CALCIUM 5 MG/1
5 TABLET, COATED ORAL
Qty: 30 TAB | Refills: 6 | Status: SHIPPED | OUTPATIENT
Start: 2020-06-15 | End: 2020-07-16 | Stop reason: SDUPTHER

## 2020-07-10 ENCOUNTER — OFFICE VISIT (OUTPATIENT)
Dept: NEUROLOGY | Age: 50
End: 2020-07-10

## 2020-07-10 DIAGNOSIS — R25.2 MUSCLE CRAMPING: Primary | ICD-10-CM

## 2020-07-10 DIAGNOSIS — E55.9 VITAMIN D DEFICIENCY: ICD-10-CM

## 2020-07-10 DIAGNOSIS — M54.12 CERVICAL RADICULOPATHY AT C7: ICD-10-CM

## 2020-07-10 DIAGNOSIS — M54.12 CERVICAL RADICULOPATHY AT C6: ICD-10-CM

## 2020-07-10 DIAGNOSIS — M54.2 NECK PAIN: ICD-10-CM

## 2020-07-10 RX ORDER — BACLOFEN 10 MG/1
10 TABLET ORAL 2 TIMES DAILY
Qty: 60 TAB | Refills: 5 | Status: SHIPPED | OUTPATIENT
Start: 2020-07-10 | End: 2020-08-13 | Stop reason: SDUPTHER

## 2020-07-10 NOTE — PROCEDURES
ELECTRODIAGNOSTIC REPORT      Test Date:  7/10/2020    Patient: Micaela Chowdary : 1970 Physician: Dr. Kamron Armando    ID#: [id number] SEX: Male Ref. Phys: Dr. Kamron Armando     Patient History / Exam:  The patient is a pleasant 54-year-old gentleman with pain, cramping, fasciculations in his upper extremities that is much worse on the right compared to the left. His examination does reveal 5 out of 5 strength with preserved reflexes. Rare fasciculations were noted. This was in his triceps    EMG & NCV Findings:    Nerve conduction studies as listed below in the bilateral upper extremities was normal.    Disposable concentric needle examination of the muscles listed below in the right upper extremity and cervical paraspinal muscles revealed no active denervation, however there was chronic neurogenic units seen in the right triceps and FCR. Impression: This study was abnormal.  There was electrodiagnostic evidence upon today's examination suggestin. A chronic right C6-C7 radiculopathy with no evidence of active denervation. 2.  There was no evidence of a focal or generalized neuropathy or myopathy. I did discuss these clinical results with the patient today. Given his persistent symptoms, I will start him on baclofen 10 mg twice a day. Side effects and toxicity of the medication were reviewed. I will recheck his vitamin D level as it was notably low and he has been on high-dose supplementation for the past 3 months. Pending those results, he may need additional vitamin D supplementation. He will follow-up in clinic in 3 months time      ___________________________  Jonna Elizondo. Daniel ANDERSON   FAAN      Nerve Conduction Studies  Anti Sensory Summary Table     Stim Site NR Onset (ms) Peak (ms) O-P Amp (µV) Norm Peak (ms) Norm O-P Amp Site1 Site2 Dist (cm) Norm Jj (m/s)   Left Median Anti Sensory (2nd Digit)  32.1°C   Wrist    2.3 3.5 33.5 <4 >11 Wrist 2nd Digit 14.0    Right Median Anti Sensory (2nd Digit)  32.1°C   Wrist    2.6 3.5 28.2 <4 >11 Wrist 2nd Digit 14.0    Left Radial Anti Sensory (Base 1st Digit)  32.1°C   Wrist    1.6 2.1 38.5 <2.8 7 Wrist Base 1st Digit 10.0    Right Radial Anti Sensory (Base 1st Digit)  32.1°C   Wrist    1.2 1.8 49.6 <2.8 7 Wrist Base 1st Digit 10.0    Left Ulnar Anti Sensory (5th Digit)  32.1°C   Wrist    2.8 3.6 23.6 <4.0 >10 Wrist 5th Digit 14.0    Right Ulnar Anti Sensory (5th Digit)  32.1°C   Wrist    2.4 3.3 30.6 <4.0 >10 Wrist 5th Digit 14.0      Motor Summary Table     Stim Site NR Onset (ms) Norm Onset (ms) O-P Amp (mV) Norm O-P Amp P-T Amp (mV) Site1 Site2 Dist (cm) Jj (m/s)   Left Median Motor (Abd Poll Brev)  32.1°C   Wrist    3.5 <4.5 11.3 >4.1  Wrist Abd Poll Brev 8.0 23   Elbow    8.0  9.8   Elbow Wrist 26.0 58   Right Median Motor (Abd Poll Brev)  32.1°C   Wrist    3.8 <4.5 11.6 >4.1  Wrist Abd Poll Brev 8.0 21   Elbow    8.0  10.9   Elbow Wrist 25.0 60   Left Ulnar Motor (Abd Dig Minimi)  32.1°C   Wrist    2.9 <3.1 7.7 >7.0  Wrist Abd Dig Minimi 8.0    B Elbow    6.9  7.4   B Elbow Wrist 22.0 55   A Elbow    8.4  7.3   A Elbow B Elbow 10.0 67   Right Ulnar Motor (Abd Dig Minimi)  32.1°C   Wrist    3.0 <3.1 10.2 >7.0  Wrist Abd Dig Minimi 8.0    B Elbow    6.9  9.2   B Elbow Wrist 23.0 59   A Elbow    8.3  8.7   A Elbow B Elbow 10.0 71     Comparison Summary Table     Stim Site NR Peak (ms) P-T Amp (µV) Site1 Site2 Dist (cm) Delta-P (ms)   Left Median/Ulnar Palm Comparison (Wrist)  32.1°C   Median Palm    1.8 37.4 Median Palm Ulnar Palm 8.0 0.2   Ulnar Palm    1.6 27.1       Right Median/Ulnar Palm Comparison (Wrist)  32.1°C   Median Palm    2.1 25.5 Median Palm Ulnar Palm 8.0 0.4   Ulnar Palm    1.7 32.8           EMG     Side Muscle Nerve Root Ins Act Fibs Psw Recrt Duration Amp Poly Comment   Right Deltoid Axillary C5-6 Nml Nml Nml Nml Nml Nml Nml    Right Triceps Radial C6-7-8 Nml Nml Nml Reduced Nml Incr Nml    Right Biceps Musculocut C5-6 Nml Nml Nml Nml Nml Incr Nml    Right PronatorTeres Median C6-7 Nml Nml Nml Reduced Nml Nml Nml    Right 1stDorInt Ulnar C8-T1 Nml Nml Nml Nml Nml Nml Nml    Right Upper Cerv Parasp Rami C3,4 Nml Nml Nml Nml Nml Nml Nml      Waveforms:

## 2020-07-11 LAB — 25(OH)D3+25(OH)D2 SERPL-MCNC: 49.4 NG/ML (ref 30–100)

## 2020-07-11 NOTE — PROGRESS NOTES
Hi,    Your vitamin D level has returned normal.  Please just take a multivitamin every day    Sincerely,  Dr. Jerome Crest

## 2020-07-16 ENCOUNTER — TELEPHONE (OUTPATIENT)
Dept: NEUROLOGY | Age: 50
End: 2020-07-16

## 2020-07-16 NOTE — TELEPHONE ENCOUNTER
Spoke to patient and gave him results of Vitamin D per [facility] and instruction to take multi vitamin daily.

## 2020-07-20 RX ORDER — ROSUVASTATIN CALCIUM 5 MG/1
5 TABLET, COATED ORAL
Qty: 30 TAB | Refills: 6 | Status: SHIPPED | OUTPATIENT
Start: 2020-07-20 | End: 2020-08-30 | Stop reason: SDUPTHER

## 2020-08-14 RX ORDER — BACLOFEN 10 MG/1
10 TABLET ORAL 2 TIMES DAILY
Qty: 60 TAB | Refills: 5 | Status: SHIPPED | OUTPATIENT
Start: 2020-08-14 | End: 2021-11-03 | Stop reason: ALTCHOICE

## 2020-08-31 RX ORDER — ROSUVASTATIN CALCIUM 5 MG/1
5 TABLET, COATED ORAL
Qty: 30 TAB | Refills: 6 | Status: SHIPPED | OUTPATIENT
Start: 2020-08-31 | End: 2020-10-18 | Stop reason: SDUPTHER

## 2020-10-20 RX ORDER — ROSUVASTATIN CALCIUM 5 MG/1
5 TABLET, COATED ORAL
Qty: 30 TAB | Refills: 6 | Status: SHIPPED | OUTPATIENT
Start: 2020-10-20 | End: 2020-11-30 | Stop reason: SDUPTHER

## 2020-11-27 NOTE — PROGRESS NOTES
Neurology Note    Patient ID:  Veva Kawasaki  [id number]  84 y.o.  1970      Date of Consultation:  November 30, 2020      Subjective: I still have cramping. History of Present Illness:   Veva Kawasaki is a 48 y.o. male who returns to the neurology clinic at [facility] for evaluation. He was initially seen as a virtual visit on April 8, 2020. Please see my history of present illness, examination, and treatment base plan from that day. He was being seen for a myriad of neck and muscle pain. After that visit he did have serology performed which revealed a low vitamin D level and he was placed on supplementation. He did have an EMG/nerve conduction study performed on July 10, 2020. There is evidence of a chronic right C6-C7 radiculopathy with no evidence of active denervation. He was placed on baclofen to help with his spasticity. His vitamin D was rechecked and it was now in the normal range. He was tried on magnesium and baclofen after last visit. Does notice the cramping. It can occur in his forearm, triceps, shoulder, calf muscles. Most of the symptoms are on the right but some do occur on the left. He is still able to work as a  and not having any difficulty with that. He does have injuries to both of his shoulders and has a partial tear of his right subscapularis. He denies any difficulty with vision, hearing, speech, or swallowing. He ultimately stopped the baclofen and the magnesium as he did not feel that it was helping much. Past Medical History:   Diagnosis Date    Anxiety state, unspecified     Basal cell carcinoma     Calculus of kidney 8/17/2014    Left 6mm. Left 1 mm. Dr. Milvia Collazo. 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 Dr. Dalton Bautista.  Other and unspecified hyperlipidemia     Shin splints 04/24/08    Dr. Bettina Madrid. Gastoc Equines    Shoulder pain 05/16/01    Right. Tendinitis.    Perales Nas    Tinnitus 2007    Right. Dr. Brennon Toney        Past Surgical History:   Procedure Laterality Date    HX LITHOTRIPSY  08/2014    Left. Dr. Cheryle Gonzales.  HX OTHER SURGICAL  1996    birth lizzeth removed from Rt buttock    HX REFRACTIVE SURGERY  12/2005    HX SKIN BIOPSY  2012    Dr. Claudette Blumenthal from left back of leg    HX TONSILLECTOMY      HX UROLOGICAL  08/2014, 09/2014    Left KIDNEY STENT THEN REMOVAL. DUE TO KIDNEY STONES. Dr. Nik Wade.  HX WISDOM TEETH EXTRACTION      NERVE BLOCK  07/2010    C3-4, C4-5. Dr. Kenisha Rodriguez. Family History   Problem Relation Age of Onset    Hypertension Mother     Arthritis-osteo Mother     High Cholesterol Mother     Cancer Father         melanoma, ?lung        Social History     Tobacco Use    Smoking status: Former Smoker     Packs/day: 0.50     Years: 24.00     Pack years: 12.00     Types: Cigarettes    Smokeless tobacco: Current User    Tobacco comment: Vapor   Substance Use Topics    Alcohol use: Yes     Alcohol/week: 5.0 - 8.3 standard drinks     Types: 6 - 10 Cans of beer per week        Allergies   Allergen Reactions    Cymbalta [Duloxetine] Other (comments)     Erectile dysfunction      Prozac [Fluoxetine] Other (comments)     Decreased libido and erection        Prior to Admission medications    Medication Sig Start Date End Date Taking? Authorizing Provider   rosuvastatin (CRESTOR) 5 mg tablet Take 1 Tab by mouth nightly. 10/20/20  Yes Nola Conway MD   OTHER Using cbd oil for sleep   Yes Provider, Historical   ibuprofen (MOTRIN) 200 mg tablet Take  by mouth. Yes Provider, Historical   pramoxine-hydrocortisone (ANALPRAM HC) 1-1 % topical cream Apply  to affected area three (3) times daily. 1/20/16  Yes Simba MCDOWELL, TIM   baclofen (LIORESAL) 10 mg tablet Take 1 Tab by mouth two (2) times a day.  8/14/20   Jeffery Sanchez, DO       Review of Systems:    General, constitutional: negative  Eyes, vision: negative  Ears, nose, throat: negative  Cardiovascular, heart: negative  Respiratory: negative  Gastrointestinal: negative  Genitourinary: negative  Musculoskeletal: negative  Skin and integumentary: negative  Psychiatric: negative  Endocrine: negative  Neurological: negative, except for HPI  Hematologic/lymphatic: negative  Allergy/immunology: negative      Objective:     Visit Vitals  Temp 97.5 °F (36.4 °C) (Temporal)   Wt 196 lb (88.9 kg)   BMI 31.64 kg/m²       Physical Exam:      General:  appears well nourished in no acute distress  Neck: no carotid bruits  Lungs: clear to auscultation  Heart:  no murmurs, regular rate  Lower extremity: peripheral pulses palpable and no edema  Skin: intact    Neurological exam:    Awake, alert, oriented to person, place and time  Recent and remote memory were normal  Attention and concentration were intact  Language was intact. There was no aphasia  Speech: no dysarthria  Fund of knowledge was preserved    Cranial nerves:   II-XII were tested    Perrrla  Visual fields were full  Eomi, no evidence of nystagmus  Facial sensation:  normal and symmetric  Facial motor: normal and symmetric  Hearing intact  SCM strength intact  Tongue: midline without fasciculations    Motor: Tone normal    No evidence of fasciculations    Strength testing:   deltoid triceps biceps Wrist ext. Wrist flex. intrinsics Hip flex. Hip ext. Knee ext. Knee flex Dorsi flex Plantar flex   Right 5 5 5 5 5 5 5 5 5 5 5 5   Left 5 5 5 5 5 5 5 5 5 5 5 5         Sensory:  Upper extremity: intact to pp, light touch, and vibration > 10 seconds  Lower extremity: intact to pp, light touch, and vibration > 10 seconds    Reflexes:    Right Left  Biceps  2 2  Triceps 2 2  Brachiorad. 2 2  Patella  2 2  Achilles 2 2    Plantar response:  flexor bilaterally      Cerebellar testing:  no tremor apparent, finger/nose and tico were intact    Romberg: absent    Gait: steady.      Labs:     Lab Results   Component Value Date/Time    Hemoglobin A1c 5.6 04/08/2020 04:48 PM    Sodium 139 04/08/2020 04:48 PM    Potassium 3.7 04/08/2020 04:48 PM    Chloride 99 04/08/2020 04:48 PM    Glucose 143 (H) 04/08/2020 04:48 PM    BUN 15 04/08/2020 04:48 PM    Creatinine 1.10 04/08/2020 04:48 PM    Calcium 9.6 04/08/2020 04:48 PM    WBC 6.8 08/16/2018 08:57 AM    HCT 42.3 08/16/2018 08:57 AM    HGB 14.4 08/16/2018 08:57 AM    PLATELET 796 30/79/9876 08:57 AM       Imaging:    Results from Hospital Encounter encounter on 02/24/20   MRI SHOULDER RT WO CONT    Narrative EXAM: MRI SHOULDER RT WO CONT    INDICATION: Right shoulder pain. Subscapularis tear. COMPARISON: 2/22/2018    TECHNIQUE: Axial proton density fat-saturated; oblique coronal T1, T2  fat-saturated, and proton density fat-saturated; and oblique sagittal T2  fat-saturated MRI of the right shoulder . CONTRAST: None. FINDINGS: A.C. joint: Mild to moderate osteoarthritis with a mild joint effusion  and surrounding edema. There is a small synovial cyst superiorly measuring 5 mm. Anterior acromion process type: 2    Bone marrow: No acute fracture, dislocation, or marrow replacing process. Joint fluid: No significant joint effusion. Trace fluid in the subacromial and  subdeltoid bursa. Rotator cuff tendons: There is an unchanged small partial-thickness articular  sided tear in the superior fibers of the distal subscapularis measuring  approximately 3.5 mm in size. The remainder of the cuff is intact. Biceps tendon: Intact and located within the bicipital groove. Muscles: No edema or atrophy. Glenoid labrum: Intact. Glenohumeral joint capsule: within normal limits. Glenohumeral articular cartilage: Intact. No focal osteochondral lesion. Soft tissue mass: None. Impression IMPRESSION:   1. Interval development of mild to moderate acromioclavicular osteoarthritis  with significant surrounding edema, a mild joint effusion, and a small synovial  cyst superiorly.   3. Unchanged small partial-thickness articular sided tear in the superior fibers  of the distal subscapularis. Results from [place] encounter on 06/12/19   CT ABD PELV WO CONT    Narrative INDICATION:  Unspecified abdominal pain  Flank pain, recurrent stone disease  suspected     EXAM: CT Abdomen and Pelvis without IV contrast. No oral contrast.  CT dose reduction was achieved through use of a standardized protocol tailored  for this examination and automatic exposure control for dose modulation. FINDINGS:   There is a punctate nonobstructing stone in each kidney. No other urinary tract  stones are seen. There is no hydroureteronephrosis. The kidneys are normal in  size. There is no perirenal fluid or ascites. Liver shows no apparent significant finding without contrast. Pancreas, adrenal  glands, spleen and aorta show no significant enlargement. There is mild  aortoiliac atherosclerotic calcification. There are a few colonic diverticula. No inflammation is seen. There is no  pneumoperitoneum or significant adenopathy. There is mild prostatomegaly. The bladder is not distended. The distal ureters  are not dilated. There is no apparent pelvic mass. The appendix is normal.  Bowels are nondilated. There is no significant stool. Impression IMPRESSION: No acute disease. Nonobstructive nephrolithiasis. Assessment and Plan:    The patient is a pleasant 27-year-old gentleman with a multiple year history of progressive pain in his neck with cramping and stiffness. He has a normal neurological examination.        Chronic cervical radiculopathy with no concern of active denervation. Discussed the importance of conservative management including stretching exercises. We did talk about higher doses of baclofen or considering medication such as gabapentin. He is not interested in medications such as that at this time. Vitamin D deficiency:  Now normalized.   Continue multivitamin. Cramping: This is most likely combination of his chronic cervical radiculopathy, degenerative spine disease, and a benign cramp fasciculation syndrome. I did talk to him about restarting magnesium if he is able. He should also continue with hydration. I did discuss the possibility of stopping vaping as vaping may be contributing some to the dehydration. I did reassure him that his neurological examination is normal today.       I also talked to him about lifestyle modifications in regards to minimizing cramping. This would include ensuring hydration, decreasing caffeine utilization, improving sleep hygiene.                   Patient Active Problem List   Diagnosis Code    Vertigo R42    Allergic rhinitis J30.9    Shoulder pain M25.519    Neck pain M54.2    Other and unspecified hyperlipidemia E78.5    Depression F32.9    Tinnitus H93.19    Calculus of kidney N20.0    Recurrent depression ([facility] [address] F33.9      The patient should return to clinic in 6 months    Renewed medication: none today    I spent   25  minutes with the patient  with over 50 % of the time counseling and coordinating the care plan in regards to the diagnosis, diagnostic testing, and treatment plan. The patient had the ability to ask questions and all questions were answered.                     Signed By:  Asia Jacobs DO FAAN    November 30, 2020

## 2020-11-30 ENCOUNTER — OFFICE VISIT (OUTPATIENT)
Dept: NEUROLOGY | Age: 50
End: 2020-11-30
Payer: COMMERCIAL

## 2020-11-30 VITALS
HEIGHT: 66 IN | HEART RATE: 80 BPM | OXYGEN SATURATION: 98 % | BODY MASS INDEX: 31.5 KG/M2 | DIASTOLIC BLOOD PRESSURE: 64 MMHG | TEMPERATURE: 97.5 F | SYSTOLIC BLOOD PRESSURE: 110 MMHG | RESPIRATION RATE: 18 BRPM | WEIGHT: 196 LBS

## 2020-11-30 DIAGNOSIS — R25.2 MUSCLE CRAMPING: Primary | ICD-10-CM

## 2020-11-30 DIAGNOSIS — E55.9 VITAMIN D DEFICIENCY: ICD-10-CM

## 2020-11-30 DIAGNOSIS — M54.12 CERVICAL RADICULOPATHY AT C7: ICD-10-CM

## 2020-11-30 PROCEDURE — 99214 OFFICE O/P EST MOD 30 MIN: CPT | Performed by: PSYCHIATRY & NEUROLOGY

## 2020-12-01 RX ORDER — ROSUVASTATIN CALCIUM 5 MG/1
5 TABLET, COATED ORAL
Qty: 30 TAB | Refills: 6 | Status: SHIPPED | OUTPATIENT
Start: 2020-12-01 | End: 2021-01-27 | Stop reason: SDUPTHER

## 2021-01-27 RX ORDER — ROSUVASTATIN CALCIUM 5 MG/1
5 TABLET, COATED ORAL
Qty: 30 TAB | Refills: 6 | Status: SHIPPED | OUTPATIENT
Start: 2021-01-27 | End: 2021-04-12 | Stop reason: SDUPTHER

## 2021-04-12 ENCOUNTER — OFFICE VISIT (OUTPATIENT)
Dept: INTERNAL MEDICINE CLINIC | Age: 51
End: 2021-04-12
Payer: COMMERCIAL

## 2021-04-12 VITALS
OXYGEN SATURATION: 96 % | HEIGHT: 66 IN | DIASTOLIC BLOOD PRESSURE: 73 MMHG | HEART RATE: 70 BPM | WEIGHT: 195 LBS | SYSTOLIC BLOOD PRESSURE: 117 MMHG | BODY MASS INDEX: 31.34 KG/M2

## 2021-04-12 DIAGNOSIS — Z11.59 NEED FOR HEPATITIS C SCREENING TEST: ICD-10-CM

## 2021-04-12 DIAGNOSIS — Z00.00 VISIT FOR WELL MAN HEALTH CHECK: Primary | ICD-10-CM

## 2021-04-12 DIAGNOSIS — E78.00 HYPERCHOLESTEREMIA: ICD-10-CM

## 2021-04-12 DIAGNOSIS — Z12.11 COLON CANCER SCREENING: ICD-10-CM

## 2021-04-12 DIAGNOSIS — F33.9 RECURRENT DEPRESSION (HCC): ICD-10-CM

## 2021-04-12 PROCEDURE — 99396 PREV VISIT EST AGE 40-64: CPT | Performed by: FAMILY MEDICINE

## 2021-04-12 RX ORDER — ROSUVASTATIN CALCIUM 5 MG/1
5 TABLET, COATED ORAL
Qty: 30 TAB | Refills: 6 | Status: SHIPPED | OUTPATIENT
Start: 2021-04-12 | End: 2021-04-13 | Stop reason: ALTCHOICE

## 2021-04-12 NOTE — PROGRESS NOTES
Chief Complaint   Patient presents with    Medication Refill    Complete Physical     he is a 48y.o. year old male who presents for CPE. Complete Physical Exam Questions:    1. Do you follow a low fat diet?  no  2. Are you up to date on your Tdap (<10 years)? Yes  3. Have you ever had a Pneumovax vaccine (>65)? Not applicable   MTX50 Not applicable   KVUQ27 Not applicable  4. Have you had Zoster vaccine (>60)? Not applicable  5. Have you had the HPV - Gardasil (13- 26)? Not applicable  6. Do you follow an exercise program?  no  7. Do you smoke? Yes, VAP If > 65 and smoker, have you had a abdominal aortic aneurysm ultrasound screen? Not applicable  8. Do you consider yourself overweight?  yes  9. Is there a family history of CAD< age 48? No  10. Is there a family history of Cancer? Yes  11. Do you know your Cancer risks? Yes  12. Have you had a colonoscopy? No  13. Have you been tested for HIV or other STI's? Yes HIV GOWDL(37-50 y/o)? Yes   14. Have you had an EKG in the last five years(>50)? Yes  15. Have you had a PSA test done this year (50-69)? Yes    Other complaints: medication refill on Crestor. Reviewed and agree with Nurse Note and duplicated in this note. Reviewed PmHx, RxHx, FmHx, SocHx, AllgHx and updated and dated in the chart. Family History   Problem Relation Age of Onset    Hypertension Mother     Arthritis-osteo Mother     High Cholesterol Mother     Cancer Father         melanoma, ?lung       Past Medical History:   Diagnosis Date    Anxiety state, unspecified     Basal cell carcinoma     Calculus of kidney 8/17/2014    Left 6mm. Left 1 mm. Dr. Asha Kirkland. 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 Dr. Landy Shoemaker.  Other and unspecified hyperlipidemia     Shin splints 04/24/08    Dr. Tracy Fleming. Gastoc Equines    Shoulder pain 05/16/01    Right. Tendinitis. Dr. Hermelinda Mckenzie    Tinnitus 2007    Right.  Dr. Franck Hayden Social History     Socioeconomic History    Marital status: SINGLE     Spouse name: Not on file    Number of children: Not on file    Years of education: Not on file    Highest education level: Not on file   Tobacco Use    Smoking status: Former Smoker     Packs/day: 0.50     Years: 24.00     Pack years: 12.00     Types: Cigarettes    Smokeless tobacco: Current User    Tobacco comment: Vapor   Substance and Sexual Activity    Alcohol use: Yes     Alcohol/week: 5.0 - 8.3 standard drinks     Types: 6 - 10 Cans of beer per week    Drug use: No    Sexual activity: Yes     Partners: Female        Review of Systems - negative except as listed above      Objective:     Vitals:    04/12/21 1404   Weight: 88.5 kg (195 lb)   Height: 5' 6\" (1.676 m)       Physical Examination: General appearance - alert, well appearing, and in no distress  Eyes - pupils equal and reactive, extraocular eye movements intact  Ears - bilateral TM's and external ear canals normal  Nose - normal and patent, no erythema, discharge or polyps  Mouth - mucous membranes moist, pharynx normal without lesions  Neck - supple, no significant adenopathy  Chest - clear to auscultation, no wheezes, rales or rhonchi, symmetric air entry  Heart - normal rate, regular rhythm, normal S1, S2, no murmurs, rubs, clicks or gallops  Abdomen - soft, nontender, nondistended, no masses or organomegaly  Back exam - full range of motion, no tenderness, palpable spasm or pain on motion  Neurological - alert, oriented, normal speech, no focal findings or movement disorder noted  Musculoskeletal - no joint tenderness, deformity or swelling  Extremities - peripheral pulses normal, no pedal edema, no clubbing or cyanosis  Skin - normal coloration and turgor, no rashes, no suspicious skin lesions noted       Assessment/ Plan:   Diagnoses and all orders for this visit:    1. Visit for [facility] health check  -     CBC W/O DIFF; Future  -     LIPID PANEL;  Future  - METABOLIC PANEL, COMPREHENSIVE; Future  -     PSA W/ REFLX FREE PSA; Future    2. Need for hepatitis C screening test  -     HEPATITIS C AB; Future    3. Recurrent depression ([facility] [address]    4. Hypercholesteremia    5. Colon cancer screening  -     REFERRAL TO GASTROENTEROLOGY    Other orders  -     rosuvastatin (CRESTOR) 5 mg tablet; Take 1 Tab by mouth nightly. Labs to be drawn: CBC, CMP, Lipid            I have discussed the diagnosis with the patient and the intended plan as seen in the above orders. The patient has received an after-visit summary and questions were answered concerning future plans. Medication Side Effects and Warnings were discussed with patient,  Patient Labs were reviewed and or requested, and  Patient Past Records were reviewed and or requested  yes         Pt agrees to call or return to clinic and/or go to closest ER with any worsening of symptoms. This may include, but not limited to increased fever (>100.4) with NSAIDS or Tylenol, increased edema, confusion, rash, worsening of presenting symptoms. Please note that this dictation was completed with [employer], the computer voice recognition software. Quite often unanticipated grammatical, syntax, homophones, and other interpretive errors are inadvertently transcribed by the computer software. Please disregard these errors. Please excuse any errors that have escaped final proofreading. Thank you.

## 2021-04-13 LAB
ALBUMIN SERPL-MCNC: 4.4 G/DL (ref 3.5–5)
ALBUMIN/GLOB SERPL: 1.5 {RATIO} (ref 1.1–2.2)
ALP SERPL-CCNC: 116 U/L (ref 45–117)
ALT SERPL-CCNC: 42 U/L (ref 12–78)
ANION GAP SERPL CALC-SCNC: 7 MMOL/L (ref 5–15)
AST SERPL-CCNC: 22 U/L (ref 15–37)
BILIRUB SERPL-MCNC: 1.1 MG/DL (ref 0.2–1)
BUN SERPL-MCNC: 16 MG/DL (ref 6–20)
BUN/CREAT SERPL: 18 (ref 12–20)
CALCIUM SERPL-MCNC: 9.4 MG/DL (ref 8.5–10.1)
CHLORIDE SERPL-SCNC: 104 MMOL/L (ref 97–108)
CHOLEST SERPL-MCNC: 220 MG/DL
CO2 SERPL-SCNC: 28 MMOL/L (ref 21–32)
CREAT SERPL-MCNC: 0.9 MG/DL (ref 0.7–1.3)
ERYTHROCYTE [DISTWIDTH] IN BLOOD BY AUTOMATED COUNT: 13.9 % (ref 11.5–14.5)
GLOBULIN SER CALC-MCNC: 2.9 G/DL (ref 2–4)
GLUCOSE SERPL-MCNC: 85 MG/DL (ref 65–100)
HCT VFR BLD AUTO: 43.4 % (ref 36.6–50.3)
HCV AB SERPL QL IA: NONREACTIVE
HCV COMMENT,HCGAC: NORMAL
HDLC SERPL-MCNC: 34 MG/DL
HDLC SERPL: 6.5 {RATIO} (ref 0–5)
HGB BLD-MCNC: 14 G/DL (ref 12.1–17)
LDLC SERPL CALC-MCNC: 161.8 MG/DL (ref 0–100)
LIPID PROFILE,FLP: ABNORMAL
MCH RBC QN AUTO: 28.7 PG (ref 26–34)
MCHC RBC AUTO-ENTMCNC: 32.3 G/DL (ref 30–36.5)
MCV RBC AUTO: 88.9 FL (ref 80–99)
NRBC # BLD: 0 K/UL (ref 0–0.01)
NRBC BLD-RTO: 0 PER 100 WBC
PLATELET # BLD AUTO: 247 K/UL (ref 150–400)
PMV BLD AUTO: 9.2 FL (ref 8.9–12.9)
POTASSIUM SERPL-SCNC: 4.2 MMOL/L (ref 3.5–5.1)
PROT SERPL-MCNC: 7.3 G/DL (ref 6.4–8.2)
RBC # BLD AUTO: 4.88 M/UL (ref 4.1–5.7)
SODIUM SERPL-SCNC: 139 MMOL/L (ref 136–145)
TRIGL SERPL-MCNC: 121 MG/DL (ref ?–150)
VLDLC SERPL CALC-MCNC: 24.2 MG/DL
WBC # BLD AUTO: 7.4 K/UL (ref 4.1–11.1)

## 2021-04-13 RX ORDER — ROSUVASTATIN CALCIUM 10 MG/1
10 TABLET, COATED ORAL
Qty: 30 TAB | Refills: 1 | Status: SHIPPED | OUTPATIENT
Start: 2021-04-13 | End: 2021-05-18 | Stop reason: SDUPTHER

## 2021-04-13 NOTE — PROGRESS NOTES
Cholesterol is still elevated, I am going to bump your Crestor up to 10 mg daily.   Please schedule appointment for cholesterol recheck in 3 months

## 2021-04-14 LAB
PSA SERPL-MCNC: 1 NG/ML (ref 0–4)
REFLEX CRITERIA: NORMAL

## 2021-05-19 RX ORDER — ROSUVASTATIN CALCIUM 10 MG/1
10 TABLET, COATED ORAL
Qty: 30 TABLET | Refills: 1 | Status: SHIPPED | OUTPATIENT
Start: 2021-05-19 | End: 2021-06-23 | Stop reason: SDUPTHER

## 2021-05-28 NOTE — PROGRESS NOTES
Neurology Note    Patient ID:  Daniele Mckeon  [id number]  82 y.o.  1970      Date of Consultation:  June 1, 2021      Subjective: I still have cramping. History of Present Illness:   Daniele Mckeon is a 48 y.o. male who returns to the neurology clinic at [facility] for evaluation. He was last seen in the clinic on November 30, 2020. Please see my history of present illness, examination, and treatment base plan from that day. He was being seen for neck and intermittent, diffuse muscle pain. His workup to date included serology performed which revealed a low vitamin D level and he was placed on supplementation. Follow up labs have revealed a normal level. He did have an EMG/nerve conduction study performed on July 10, 2020. There is evidence of a chronic right C6-C7 radiculopathy with no evidence of active denervation. He was placed on baclofen to help with his spasticity. He has tried magnesium and baclofen which he did not feel helped much, but did cause increasing gi symptoms. Cramping still occurs in multiple muscles. He is still able to work as a  and not having any difficulty with that. He does have injuries to both of his shoulders and has a partial tear of his right subscapularis. He did have a fall off of a truck which injured his left shoulder. He denies any difficulty with vision, hearing, speech, or swallowing. Past Medical History:   Diagnosis Date    Anxiety state, unspecified     Basal cell carcinoma     Calculus of kidney 8/17/2014    Left 6mm. Left 1 mm. Dr. Israel Curtis. 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 Dr. Gilles Shanks.  Other and unspecified hyperlipidemia     Shin splints 04/24/08    Dr. Jenni Brooks. Gastoc Equines    Shoulder pain 05/16/01    Right. Tendinitis. Dr. Ana Moran    Tinnitus 2007    Right.  Dr. Benjamin German        Past Surgical History:   Procedure Laterality Date  HX LITHOTRIPSY  08/2014    Left. Dr. Adilson Disla.  HX OTHER SURGICAL  1996    birth lizzeth removed from Rt buttock    HX REFRACTIVE SURGERY  12/2005    HX SKIN BIOPSY  2012    Dr. Stella Mccormack from left back of leg    HX TONSILLECTOMY      HX UROLOGICAL  08/2014, 09/2014    Left KIDNEY STENT THEN REMOVAL. DUE TO KIDNEY STONES. Dr. Marizol Coleman.  HX WISDOM TEETH EXTRACTION      NERVE BLOCK  07/2010    C3-4, C4-5. Dr. Stanley Rivera. Family History   Problem Relation Age of Onset    Hypertension Mother     Arthritis-osteo Mother     High Cholesterol Mother     Cancer Father         melanoma, ?lung        Social History     Tobacco Use    Smoking status: Former Smoker     Packs/day: 0.50     Years: 24.00     Pack years: 12.00     Types: Cigarettes    Smokeless tobacco: Current User    Tobacco comment: Vapor   Substance Use Topics    Alcohol use: Yes     Alcohol/week: 5.0 - 8.3 standard drinks     Types: 6 - 10 Cans of beer per week        Allergies   Allergen Reactions    Cymbalta [Duloxetine] Other (comments)     Erectile dysfunction      Prozac [Fluoxetine] Other (comments)     Decreased libido and erection        Prior to Admission medications    Medication Sig Start Date End Date Taking? Authorizing Provider   rosuvastatin (CRESTOR) 10 mg tablet Take 1 Tablet by mouth nightly. 5/19/21  Yes Trupti Unger MD   OTHER Using cbd oil for sleep   Yes Provider, Historical   ibuprofen (MOTRIN) 200 mg tablet Take  by mouth. Yes Provider, Historical   baclofen (LIORESAL) 10 mg tablet Take 1 Tab by mouth two (2) times a day. Patient not taking: Reported on 6/1/2021 8/14/20   Jennifer Class A, DO   pramoxine-hydrocortisone (ANALPRAM HC) 1-1 % topical cream Apply  to affected area three (3) times daily.   Patient not taking: Reported on 6/1/2021 1/20/16   Cyndi Calixto NP       Review of Systems:    General, constitutional: negative  Eyes, vision: negative  Ears, nose, throat: negative  Cardiovascular, heart: negative  Respiratory: negative  Gastrointestinal: negative  Genitourinary: negative  Musculoskeletal: negative  Skin and integumentary: negative  Psychiatric: negative  Endocrine: negative  Neurological: negative, except for HPI  Hematologic/lymphatic: negative  Allergy/immunology: negative      Objective:     Visit Vitals  /68 (BP 1 Location: Right arm, BP Patient Position: Sitting, BP Cuff Size: Adult)   Pulse (!) 51   Temp 98.4 °F (36.9 °C)   Resp 16   Wt 192 lb (87.1 kg)   SpO2 97%   BMI 30.99 kg/m²       Physical Exam:    General:  appears well nourished in no acute distress  Neck: no carotid bruits  Lungs: clear to auscultation  Heart:  no murmurs, regular rate  Lower extremity: peripheral pulses palpable and no edema  Skin: intact    Neurological exam:    Awake, alert, oriented to person, place and time  Recent and remote memory were normal  Attention and concentration were intact  Language was intact. There was no aphasia  Speech: no dysarthria  Fund of knowledge was preserved    Cranial nerves:   II-XII were tested    Perrrla  Visual fields were full  Eomi, no evidence of nystagmus  Facial sensation:  normal and symmetric  Facial motor: normal and symmetric  Hearing intact  SCM strength intact  Tongue: midline without fasciculations    Motor: Tone normal  Pronator drift was absent    No evidence of fasciculations    Strength testing:   deltoid triceps biceps Wrist ext. Wrist flex. intrinsics Hip flex. Hip ext. Knee ext. Knee flex Dorsi flex Plantar flex   Right 5 5 5 5 5 5 5 5 5 5 5 5   Left 5 5 5 5 5 5 5 5 5 5 5 5       Sensory:  Upper extremity: intact to pp, light touch, and vibration > 10 seconds  Lower extremity: intact to pp, light touch, and vibration > 10 seconds    Reflexes:    Right Left  Biceps  2 2  Triceps 2 2  Brachiorad.  2 2  Patella  2 2  Achilles 2 2    Plantar response:  flexor bilaterally    Cerebellar testing:  no tremor apparent, finger/nose and tico were intact    Romberg: absent    Gait: steady. Labs:     Lab Results   Component Value Date/Time    Hemoglobin A1c 5.6 04/08/2020 04:48 PM    Sodium 139 04/12/2021 02:46 PM    Potassium 4.2 04/12/2021 02:46 PM    Chloride 104 04/12/2021 02:46 PM    Glucose 85 04/12/2021 02:46 PM    BUN 16 04/12/2021 02:46 PM    Creatinine 0.90 04/12/2021 02:46 PM    Calcium 9.4 04/12/2021 02:46 PM    WBC 7.4 04/12/2021 02:46 PM    HCT 43.4 04/12/2021 02:46 PM    HGB 14.0 04/12/2021 02:46 PM    PLATELET 003 01/66/9024 02:46 PM       Imaging:    Results from Hospital Encounter encounter on 02/24/20    MRI SHOULDER RT WO CONT    Narrative  EXAM: MRI SHOULDER RT WO CONT    INDICATION: Right shoulder pain. Subscapularis tear. COMPARISON: 2/22/2018    TECHNIQUE: Axial proton density fat-saturated; oblique coronal T1, T2  fat-saturated, and proton density fat-saturated; and oblique sagittal T2  fat-saturated MRI of the right shoulder . CONTRAST: None. FINDINGS: A.C. joint: Mild to moderate osteoarthritis with a mild joint effusion  and surrounding edema. There is a small synovial cyst superiorly measuring 5 mm. Anterior acromion process type: 2    Bone marrow: No acute fracture, dislocation, or marrow replacing process. Joint fluid: No significant joint effusion. Trace fluid in the subacromial and  subdeltoid bursa. Rotator cuff tendons: There is an unchanged small partial-thickness articular  sided tear in the superior fibers of the distal subscapularis measuring  approximately 3.5 mm in size. The remainder of the cuff is intact. Biceps tendon: Intact and located within the bicipital groove. Muscles: No edema or atrophy. Glenoid labrum: Intact. Glenohumeral joint capsule: within normal limits. Glenohumeral articular cartilage: Intact. No focal osteochondral lesion. Soft tissue mass: None. Impression  IMPRESSION:  1.  Interval development of mild to moderate acromioclavicular osteoarthritis  with significant surrounding edema, a mild joint effusion, and a small synovial  cyst superiorly. 3. Unchanged small partial-thickness articular sided tear in the superior fibers  of the distal subscapularis. Results from [place] encounter on 06/12/19    CT ABD PELV WO CONT    Narrative  INDICATION:  Unspecified abdominal pain  Flank pain, recurrent stone disease  suspected    EXAM: CT Abdomen and Pelvis without IV contrast. No oral contrast.  CT dose reduction was achieved through use of a standardized protocol tailored  for this examination and automatic exposure control for dose modulation. FINDINGS:  There is a punctate nonobstructing stone in each kidney. No other urinary tract  stones are seen. There is no hydroureteronephrosis. The kidneys are normal in  size. There is no perirenal fluid or ascites. Liver shows no apparent significant finding without contrast. Pancreas, adrenal  glands, spleen and aorta show no significant enlargement. There is mild  aortoiliac atherosclerotic calcification. There are a few colonic diverticula. No inflammation is seen. There is no  pneumoperitoneum or significant adenopathy. There is mild prostatomegaly. The bladder is not distended. The distal ureters  are not dilated. There is no apparent pelvic mass. The appendix is normal.  Bowels are nondilated. There is no significant stool. Impression  IMPRESSION: No acute disease. Nonobstructive nephrolithiasis. Assessment and Plan:    The patient is a pleasant 54-year-old gentleman with a multiple year history of  pain in his neck with cramping and stiffness. He has a normal neurological examination.        Chronic cervical radiculopathy with no concern of active denervation. Discussed the importance of conservative management including stretching exercises. Muscle relaxants were not helpful. He is not interested in additional medication at this time.   I did also talk to the patient about considering yoga. Vitamin D deficiency:  Now normalized. Continue multivitamin. Cramping: This is most likely combination of his chronic cervical radiculopathy, degenerative spine disease, and a benign cramp fasciculation syndrome. I did talk to him about restarting magnesium if he is able. He felt that this loosen his stools too much. He should also continue with hydration. Needs to stop vaping. I did reassure him that his neurological examination is normal today.       I also talked to him about lifestyle modifications in regards to minimizing cramping. This would include ensuring hydration, decreasing caffeine utilization, improving sleep hygiene.       He is not interested in the covid vaccine.            Patient Active Problem List   Diagnosis Code    Vertigo R42    Allergic rhinitis J30.9    Shoulder pain M25.519    Neck pain M54.2    Other and unspecified hyperlipidemia E78.5    Depression F32.9    Tinnitus H93.19    Calculus of kidney N20.0    Recurrent depression ([address] F33.9      The patient should return to clinic in one year    Renewed medication: none today    I spent  20  minutes on the day of the encounter preparing the office visit by reviewing medical records, obtaining a history, performing examination, counseling and educating the patient and on diagnosis, documenting in the clinical medical record, and coordinating the care for the patient. The patient had the ability to ask questions and all questions were answered.                  Signed By:  Frederic Dickerson DO FAAN    June 1, 2021

## 2021-06-01 ENCOUNTER — OFFICE VISIT (OUTPATIENT)
Dept: NEUROLOGY | Age: 51
End: 2021-06-01
Payer: COMMERCIAL

## 2021-06-01 VITALS
TEMPERATURE: 98.4 F | DIASTOLIC BLOOD PRESSURE: 68 MMHG | WEIGHT: 192 LBS | HEART RATE: 51 BPM | BODY MASS INDEX: 30.99 KG/M2 | OXYGEN SATURATION: 97 % | RESPIRATION RATE: 16 BRPM | SYSTOLIC BLOOD PRESSURE: 106 MMHG

## 2021-06-01 DIAGNOSIS — M54.2 NECK PAIN: ICD-10-CM

## 2021-06-01 DIAGNOSIS — R25.2 MUSCLE CRAMPING: Primary | ICD-10-CM

## 2021-06-01 DIAGNOSIS — M54.12 CERVICAL RADICULOPATHY AT C6: ICD-10-CM

## 2021-06-01 PROCEDURE — 99213 OFFICE O/P EST LOW 20 MIN: CPT | Performed by: PSYCHIATRY & NEUROLOGY

## 2021-06-01 NOTE — LETTER
6/1/2021    Patient: Makenzie Solis   YOB: 1970   Date of Visit: 6/1/2021     Lennie Christie MD  [address] [facility] [address]  [place] [employer]    Dear Lennie Christie MD,      Thank you for referring Mr. Justus Mehta to [address] [place] [address] for evaluation. My notes for this consultation are attached. If you have questions, please do not hesitate to call me. I look forward to following your patient along with you.       Sincerely,    Jeffery Sanchez, DO

## 2021-06-28 RX ORDER — ROSUVASTATIN CALCIUM 10 MG/1
10 TABLET, COATED ORAL
Qty: 30 TABLET | Refills: 1 | Status: SHIPPED | OUTPATIENT
Start: 2021-06-28 | End: 2021-08-17 | Stop reason: SDUPTHER

## 2021-08-19 RX ORDER — ROSUVASTATIN CALCIUM 10 MG/1
10 TABLET, COATED ORAL
Qty: 30 TABLET | Refills: 1 | Status: SHIPPED | OUTPATIENT
Start: 2021-08-19 | End: 2021-10-03 | Stop reason: SDUPTHER

## 2021-10-04 RX ORDER — ROSUVASTATIN CALCIUM 10 MG/1
10 TABLET, COATED ORAL
Qty: 30 TABLET | Refills: 1 | Status: SHIPPED | OUTPATIENT
Start: 2021-10-04 | End: 2021-12-03 | Stop reason: SDUPTHER

## 2021-11-03 ENCOUNTER — OFFICE VISIT (OUTPATIENT)
Dept: INTERNAL MEDICINE CLINIC | Age: 51
End: 2021-11-03
Payer: COMMERCIAL

## 2021-11-03 VITALS
BODY MASS INDEX: 31.5 KG/M2 | TEMPERATURE: 98.7 F | WEIGHT: 196 LBS | HEART RATE: 90 BPM | DIASTOLIC BLOOD PRESSURE: 78 MMHG | RESPIRATION RATE: 16 BRPM | SYSTOLIC BLOOD PRESSURE: 117 MMHG | HEIGHT: 66 IN | OXYGEN SATURATION: 97 %

## 2021-11-03 DIAGNOSIS — M25.562 ACUTE PAIN OF LEFT KNEE: ICD-10-CM

## 2021-11-03 DIAGNOSIS — M76.892 HAMSTRING TENDONITIS OF LEFT THIGH: Primary | ICD-10-CM

## 2021-11-03 DIAGNOSIS — M79.645 FINGER PAIN, LEFT: ICD-10-CM

## 2021-11-03 PROCEDURE — 99214 OFFICE O/P EST MOD 30 MIN: CPT | Performed by: FAMILY MEDICINE

## 2021-11-03 NOTE — PROGRESS NOTES
Chief Complaint   Patient presents with    Knee Pain     Patient is here for left knee pain      he is a 48y.o. year old male who presents for evaluation of left knee   Pain Assessment Encounter      Ebenezer Win  11/3/2021  Onset of Symptoms: Patient states he has had pain for 2 months   ________________________________________________________________________  Description:Patient states no injures, Patient states knee only bothers him when doing certain movements     Frequency: 4-5 times a day  Pain Scale:(1-10): 3  Trauma Hx: none  Hx of similar symptoms: No:   Radiation: YES, calf  Duration:  intermittent      Progression: has worsened  What makes it better?: OTC meds  What makes it worse?:exercise and walking  Medications tried: acetaminophen, ibuprofen    Reviewed and agree with Nurse Note and duplicated in this note. Reviewed PmHx, RxHx, FmHx, SocHx, AllgHx and updated and dated in the chart. Family History   Problem Relation Age of Onset    Hypertension Mother     Arthritis-osteo Mother     High Cholesterol Mother     Cancer Father         melanoma, ?lung       Past Medical History:   Diagnosis Date    Anxiety state, unspecified     Basal cell carcinoma     Calculus of kidney 8/17/2014    Left 6mm. Left 1 mm. Dr. Danielle Whitaker. 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 Dr. Tellez Mess.  Other and unspecified hyperlipidemia     Shin splints 04/24/08    Dr. Adrian Lyle. Gastoc Equines    Shoulder pain 05/16/01    Right. Tendinitis. Dr. Belen Blanchard    Tinnitus 2007    Right. Dr. Lukasz Serna History     Socioeconomic History    Marital status: SINGLE   Tobacco Use    Smoking status: Former Smoker     Packs/day: 0.50     Years: 24.00     Pack years: 12.00     Types: Cigarettes    Smokeless tobacco: Current User    Tobacco comment: Vapor   Substance and Sexual Activity    Alcohol use:  Yes     Alcohol/week: 5.0 - 8.3 standard drinks Types: 6 - 10 Cans of beer per week    Drug use: No    Sexual activity: Yes     Partners: Female        Review of Systems - negative except as listed above      Objective:     Vitals:    11/03/21 1453   BP: 117/78   Pulse: 90   Resp: 16   Temp: 98.7 °F (37.1 °C)   SpO2: 97%   Weight: 196 lb (88.9 kg)   Height: 5' 6\" (1.676 m)       Physical Examination: General appearance - alert, well appearing, and in no distress  Back exam - full range of motion, no tenderness, palpable spasm or pain on motion  Neurological - alert, oriented, normal speech, no focal findings or movement disorder noted  Musculoskeletal - left hand -pain with palpation of left MCP joint with no deformities noted, slightly decreased flexion, normal extension  left knee exam:  The patient'sleft Knee  is  normal to inspection. The ROM is normal and there is flexion to Normal Effusion is: mild The joint exhibits Negative warmth and Crepitus is: absent. The Madi test is:  negative Joint Line Tenderness is  negative medial.  The Thessaly Test is negative  and the Lachman is  negative. The Anterior Drawer is negative. The Posterior Drawer is:  negative.  Valgus Stress (for MCL) is:  normal . Varus Stress (for LCL) is  normal  . The Jihan Test is negative and the Apprehension Sign:  negative  Patellar Grind negative  Extremities - peripheral pulses normal, no pedal edema, no clubbing or cyanosis  Skin - normal coloration and turgor, no rashes, no suspicious skin lesions noted   Indications for study:  Left knee pain  Limited musculoskeletal ultrasound examination was performed on the posterior left knee with the following findings: Medial gastrocnemius / semimembranosis intertendinous interface (crossover) - long: no Baker cyst   Medial gastrocnemius / semimembranosis intertendinous interface (crossover) - trans: no Baker cyst   Biceps femoris distal: Long-normal fibular structure with slight swelling at 2 cm from distal attachment    Impression: Distal hamstring tendinitis otherwise normal knee    Scanned and Interpreted by:  Ronnie Thomas MD CAQSM RMSK  Assessment/ Plan:   Diagnoses and all orders for this visit:    1. Hamstring tendonitis of left thigh  -     AMB POC US,EXTREMITY,NONVASCULAR,REAL-TIME IMAGE,LIMITED    2. Acute pain of left knee  -     XR KNEE LT MIN 4 V; Future    3. Finger pain, left  -     XR 3RD FINGER LT MIN 2 V; Future    Pain is likely sprain of the MCP joint, this may take up to 3 to 6 months to fully heal    Pathophysiology, recovery and rehabilitation process discussed and questions answered   Counseling for 30 Minutes of the total visit duration   Pictures and figures used as necessary   Provided reassurance   Monitor response to home exercises   Recommend activity modification   Recommend  lower impact activities-walking, Eliptical, Nordic Track, cycling or swimming   Follow up in 4 week(s)             1) Remember to stay active and/or exercise regularly (I suggest 30-45 minutes daily)   2) For reliable dietary information, go to www. EATRIGHT.org. You may wish to consider seeing the nutritionist at [facility] [phone], also consider the [address] I have discussed the diagnosis with the patient and the intended plan as seen in the above orders. The patient has received an after-visit summary and questions were answered concerning future plans. Medication Side Effects and Warnings were discussed with patient,  Patient Labs were reviewed and or requested, and  Patient Past Records were reviewed and or requested  yes      Pt agrees to call or return to clinic and/or go to closest ER with any worsening of symptoms. This may include, but not limited to increased fever (>100.4) with NSAIDS or Tylenol, increased edema, confusion, rash, worsening of presenting symptoms. Please note that this dictation was completed with [employer], the [employer] voice recognition software.   Quite often unanticipated grammatical, syntax, homophones, and other interpretive errors are inadvertently transcribed by the computer software. Please disregard these errors. Please excuse any errors that have escaped final proofreading. Thank you.

## 2021-12-01 ENCOUNTER — OFFICE VISIT (OUTPATIENT)
Dept: INTERNAL MEDICINE CLINIC | Age: 51
End: 2021-12-01
Payer: COMMERCIAL

## 2021-12-01 VITALS
RESPIRATION RATE: 16 BRPM | TEMPERATURE: 98.7 F | HEIGHT: 66 IN | HEART RATE: 73 BPM | BODY MASS INDEX: 31.24 KG/M2 | OXYGEN SATURATION: 97 % | DIASTOLIC BLOOD PRESSURE: 83 MMHG | SYSTOLIC BLOOD PRESSURE: 128 MMHG | WEIGHT: 194.4 LBS

## 2021-12-01 DIAGNOSIS — M79.645 FINGER PAIN, LEFT: ICD-10-CM

## 2021-12-01 DIAGNOSIS — M79.644 FINGER PAIN, RIGHT: ICD-10-CM

## 2021-12-01 DIAGNOSIS — M76.892 HAMSTRING TENDONITIS OF LEFT THIGH: Primary | ICD-10-CM

## 2021-12-01 PROCEDURE — 99214 OFFICE O/P EST MOD 30 MIN: CPT | Performed by: FAMILY MEDICINE

## 2021-12-01 NOTE — PROGRESS NOTES
Chief Complaint   Patient presents with    Leg Pain     Patient is here for a follow upof  left hamstring      he is a 46y.o. year old male who presents for follow up of injury. Follow Up Pain Assessment Encounter      Onset of Symptoms: Patient states he has had pain for months   ________________________________________________________________________  Description: Pain has slightly improved      Pain Scale:(1-10): 1  Duration:  intermittent  Radiation: thigh  What makes it better?: exercise and heat  What makes it worse?:sitting  Medications tried: ibuprofen  Modalities tried:         Reviewed and agree with Nurse Note and duplicated in this note. Reviewed PmHx, RxHx, FmHx, SocHx, AllgHx and updated and dated in the chart. Family History   Problem Relation Age of Onset    Hypertension Mother     Arthritis-osteo Mother     High Cholesterol Mother     Cancer Father         melanoma, ?lung       Past Medical History:   Diagnosis Date    Anxiety state, unspecified     Basal cell carcinoma     Calculus of kidney 8/17/2014    Left 6mm. Left 1 mm. Dr. Jurgen Faustin. 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 Dr. Nunu Flowers.  Other and unspecified hyperlipidemia     Shin splints 04/24/08    Dr. Tomasita Hodgkins. Gastoc Equines    Shoulder pain 05/16/01    Right. Tendinitis. Dr. Petit Barview    Tinnitus 2007    Right. Dr. Hurley Codding History     Socioeconomic History    Marital status: SINGLE   Tobacco Use    Smoking status: Former Smoker     Packs/day: 0.50     Years: 24.00     Pack years: 12.00     Types: Cigarettes    Smokeless tobacco: Current User    Tobacco comment: Vapor   Substance and Sexual Activity    Alcohol use:  Yes     Alcohol/week: 5.0 - 8.3 standard drinks     Types: 6 - 10 Cans of beer per week    Drug use: No    Sexual activity: Yes     Partners: Female        Review of Systems - negative except as listed above      Objective:     Vitals: 12/01/21 1011   BP: 128/83   Pulse: 73   Resp: 16   Temp: 98.7 °F (37.1 °C)   SpO2: 97%   Weight: 194 lb 6.4 oz (88.2 kg)   Height: 5' 6\" (1.676 m)       Physical Examination: General appearance - alert, well appearing, and in no distress  Back exam - full range of motion, no tenderness, palpable spasm or pain on motion  Neurological - alert, oriented, normal speech, no focal findings or movement disorder noted  Musculoskeletal - left hand -pain with palpation of left MCP joint with no deformities noted, slightly decreased flexion, normal extension  left knee exam:  The patient'sleft Knee  is  normal to inspection. The ROM is normal and there is flexion to Normal Effusion is: mild The joint exhibits Negative warmth and Crepitus is: absent. The Madi test is:  negative Joint Line Tenderness is  negative medial.  The Thessaly Test is negative  and the Lachman is  negative. The Anterior Drawer is negative. The Posterior Drawer is:  negative. Valgus Stress (for MCL) is:  normal . Varus Stress (for LCL) is  normal  . The Jihan Test is negative and the Apprehension Sign:  negative  Patellar Grind negative  Extremities - peripheral pulses normal, no pedal edema, no clubbing or cyanosis  Skin - normal coloration and turgor, no rashes, no suspicious skin lesions noted   Indications for study:  Left knee pain  Limited musculoskeletal ultrasound examination was performed on the posterior left knee with the following findings: Medial gastrocnemius / semimembranosis intertendinous interface (crossover) - long: no Baker cyst   Medial gastrocnemius / semimembranosis intertendinous interface (crossover) - trans: no Baker cyst   Biceps femoris distal: Long-normal fibular structure with slight swelling at 2 cm from distal attachment    Impression: Distal hamstring tendinitis otherwise normal knee    Scanned and Interpreted by:  Theodora Brown MD CALIL[facility] RMSK     Assessment/ Plan:   Diagnoses and all orders for this visit:    1. Hamstring tendonitis of left thigh    2. Finger pain, left  -     REFERRAL TO ORTHOPEDICS    3. Finger pain, right  -     REFERRAL TO ORTHOPEDICS       Patient would like to continue doing home therapy at this time for his hamstring tendinitis, if no resolution will consider further imaging    Pathophysiology, recovery and rehabilitation process discussed and questions answered   Counseling for 30 Minutes of the total visit duration   Pictures and figures used as necessary   Provided reassurance   Monitor response to home exercises   Recommend activity modification   Recommend  lower impact activities-walking, Eliptical, Nordic Track, cycling or swimming   Follow up in 4 week(s)              I have discussed the diagnosis with the patient and the intended plan as seen in the above orders. The patient has received an after-visit summary and questions were answered concerning future plans. Medication Side Effects and Warnings were discussed with patient,  Patient Labs were reviewed and or requested, and  Patient Past Records were reviewed and or requested  yes     Pt agrees to call or return to clinic and/or go to closest ER with any worsening of symptoms. This may include, but not limited to increased fever (>100.4) with NSAIDS or Tylenol, increased edema, confusion, rash, worsening of presenting symptoms. Please note that this dictation was completed with [employer], the [employer] voice recognition software. Quite often unanticipated grammatical, syntax, homophones, and other interpretive errors are inadvertently transcribed by the computer software. Please disregard these errors. Please excuse any errors that have escaped final proofreading. Thank you.

## 2021-12-03 RX ORDER — ROSUVASTATIN CALCIUM 10 MG/1
10 TABLET, COATED ORAL
Qty: 30 TABLET | Refills: 1 | Status: SHIPPED | OUTPATIENT
Start: 2021-12-03 | End: 2022-01-19 | Stop reason: SDUPTHER

## 2022-01-05 ENCOUNTER — OFFICE VISIT (OUTPATIENT)
Dept: ORTHOPEDIC SURGERY | Age: 52
End: 2022-01-05
Payer: COMMERCIAL

## 2022-01-05 VITALS — BODY MASS INDEX: 29.73 KG/M2 | HEIGHT: 66 IN | WEIGHT: 185 LBS

## 2022-01-05 DIAGNOSIS — M79.641 BILATERAL HAND PAIN: Primary | ICD-10-CM

## 2022-01-05 DIAGNOSIS — S63.652A SPRAIN OF METACARPOPHALANGEAL (MCP) JOINT OF RIGHT MIDDLE FINGER, INITIAL ENCOUNTER: ICD-10-CM

## 2022-01-05 DIAGNOSIS — S63.653A SPRAIN OF METACARPOPHALANGEAL (MCP) JOINT OF LEFT MIDDLE FINGER, INITIAL ENCOUNTER: ICD-10-CM

## 2022-01-05 DIAGNOSIS — M79.642 BILATERAL HAND PAIN: Primary | ICD-10-CM

## 2022-01-05 PROCEDURE — 99203 OFFICE O/P NEW LOW 30 MIN: CPT | Performed by: ORTHOPAEDIC SURGERY

## 2022-01-05 RX ORDER — MV/FA/DHA/EPA/FISH OIL/SAW/GNK 400MCG-200
COMBINATION PACKAGE (EA) ORAL
COMMUNITY

## 2022-01-05 NOTE — PATIENT INSTRUCTIONS
Hand Exercises      Tendon glides   1. In this exercise, the steps follow one another to a make a continuous movement. 2. With your affected hand, point your fingers and thumb straight up. Your wrist should be relaxed, following the line of your fingers and thumb. 3. Curl your fingers so that the top two joints in them are bent, and your fingers wrap down. Your fingertips should touch or be near the base of your fingers. Your fingers will look like a hook. 4. Make a fist by bending your knuckles. Your thumb can gently rest against your index (pointing) finger. 5. Unwind your fingers slightly so that your fingertips can touch the base of your palm. Your thumb can rest against your index finger. 6. Move back to your starting position, with your fingers and thumb pointing up. 7. Repeat the series of motions 8 to 12 times. 8. Switch hands and repeat steps 1 through 6, even if only one hand is sore. Intrinsic flexion   1. Rest your affected hand on a table and bend the large joints where your fingers connect to your hand. Keep your thumb and the other joints in your fingers straight. 2. Slowly straighten your fingers. Your wrist should be relaxed, following the line of your fingers and thumb. 3. Move back to your starting position, with your hand bent. 4. Repeat 8 to 12 times. 5. Switch hands and repeat steps 1 through 4, even if only one hand is sore. Finger extension   1. Place your affected hand flat on a table. 2. Lift and then lower one finger at a time off the table. 3. Repeat 8 to 12 times. 4. Switch hands and repeat steps 1 through 3, even if only one hand is sore. MP extension   1. Place your good hand on a table, palm up.  Put your affected hand on top of your good hand with your fingers wrapped around the thumb of your good hand like you are making a fist.  2. Slowly uncurl the joints of your affected hand where your fingers connect to your hand so that only the top two joints of your fingers are bent. Your fingers will look like a hook. 3. Move back to your starting position, with your fingers wrapped around your good thumb. 4. Repeat 8 to 12 times. 5. Switch hands and repeat steps 1 through 4, even if only one hand is sore.

## 2022-01-05 NOTE — PROGRESS NOTES
HPI: Cara Colbert (: 1970) is a 46 y.o. male, patient, here for evaluation of the following chief complaint(s):    Hand Pain (bilateral middle finger injuries May 2021 lifting a lawnmower box from truck)  Patient seen today to evaluate his hands. He works as a  and reports that he injured his hands while at home lifting a heavy lawnmower out of a box in 2021. He felt an immediate pop and pain involving each middle finger at the metacarpal phalangeal joint. He thinks he had temporary subluxation near dislocation of these MP joints that self reduced. He has been able to restore range of motion but he has pain when he applies force stress testing the radial collateral ligament of the right more than the left middle finger and is seen for further treatment. No true digital clicking or locking. Vitals:  Ht 5' 6\" (1.676 m)   Wt 185 lb (83.9 kg)   BMI 29.86 kg/m²    Body mass index is 29.86 kg/m². Allergies   Allergen Reactions    Cymbalta [Duloxetine] Other (comments)     Erectile dysfunction      Prozac [Fluoxetine] Other (comments)     Decreased libido and erection       Current Outpatient Medications   Medication Sig    krill oil 500 mg cap Take  by mouth.  rosuvastatin (CRESTOR) 10 mg tablet Take 1 Tablet by mouth nightly.  OTHER Using cbd oil for sleep    ibuprofen (MOTRIN) 200 mg tablet Take  by mouth. No current facility-administered medications for this visit. Past Medical History:   Diagnosis Date    Anxiety state, unspecified     Basal cell carcinoma     Calculus of kidney 2014    Left 6mm. Left 1 mm. Dr. Kylee Sharma. 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 Dr. Tushar Monique.  Other and unspecified hyperlipidemia     Shin splints 08    Dr. Chambers. Gastoc Equines    Shoulder pain 01    Right. Tendinitis. Dr. Roselyn Potter    Tinnitus     Right.  Dr. Mya Lopez        Past Surgical History:   Procedure Laterality Date    HX LITHOTRIPSY  08/2014    Left. Dr. Driver Sep.  HX OTHER SURGICAL  1996    birth lizzeth removed from Rt buttock    HX REFRACTIVE SURGERY  12/2005    HX SKIN BIOPSY  2012    Dr. Meri Recio from left back of leg    HX TONSILLECTOMY      HX UROLOGICAL  08/2014, 09/2014    Left KIDNEY STENT THEN REMOVAL. DUE TO KIDNEY STONES. Dr. Isabel Adam.  HX WISDOM TEETH EXTRACTION      NERVE BLOCK  07/2010    C3-4, C4-5. Dr. Jovon Meza. Family History   Problem Relation Age of Onset    Hypertension Mother     OSTEOARTHRITIS Mother     High Cholesterol Mother     Cancer Father         melanoma, ?lung        Social History     Tobacco Use    Smoking status: Former Smoker     Packs/day: 0.50     Years: 24.00     Pack years: 12.00     Types: Cigarettes    Smokeless tobacco: Current User    Tobacco comment: Vapor   Substance Use Topics    Alcohol use: Yes     Alcohol/week: 5.0 - 8.3 standard drinks     Types: 6 - 10 Cans of beer per week    Drug use: No        Review of Systems    ROS     Positive for: Musculoskeletal    Last edited by Crissy Olivera LPN on 2/4/5532  9:02 PM. (History)             Physical Exam    Both hands have full range of motion but he does have tenderness to the radial aspect of the metacarpal phalangeal joints of each middle finger. Stress testing aggravated pain but did not seem to increase gapping. Full extension and flexion of MP PIP and DIP joints. No instability. No cyst.    Examination of the right wrist reveals no swelling, edema or warmth, no tenderness to palpation, no pain, normal strength and tone, normal range of motion, no crepitus, normal sensation, no instability or laxity and no known fractures or deformities.   Examination of the left wrist reveals no swelling, edema or warmth, no tenderness to palpation, no pain, normal strength and tone, normal range of motion, no crepitus, normal sensation, no instability or laxity and no known fractures or deformities. Imaging:    XR Results (maximum last 3): Results from Appointment encounter on 11/03/21    XR 3RD FINGER LT MIN 2 V    Narrative  EXAM: XR 3RD FINGER LT MIN 2 V    INDICATION: Left third finger pain after injury lifting a box in June, 2021. COMPARISON: None. FINDINGS: Three views of the left third finger demonstrate no fracture or other  acute osseous or articular abnormality. The soft tissues are within normal  limits. 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 Tiny osteophytes. No  erosion or periosteal reaction. 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 No evidence of inflammatory  arthritis. ASSESSMENT/PLAN:  Below is the assessment and plan developed based on review of pertinent history, physical exam, labs, studies, and medications. Patient examination was consistent with at least chronic sprains if not ruptures of the radial collateral ligament at each middle finger metacarpal phalangeal joint. His injury lifting a lawnmower out of a box occurred in June 2021. He has treated conservatively and has been more than 6 months and not fully healed. We spoke about injections and further diagnostic intervention. I recommend an MRI studies of each middle finger to evaluate for radiocarpal ligament tears at the metacarpal phalangeal joints and we will see him back once completed for further review. 1. Bilateral hand pain  2. Sprain of metacarpophalangeal (MCP) joint of right middle finger, initial encounter  3. Sprain of metacarpophalangeal (MCP) joint of left middle finger, initial encounter      Return for Follow-up after MRI test completion. An electronic signature was used to authenticate this note.   -- Estiven Singer MD

## 2022-01-20 RX ORDER — ROSUVASTATIN CALCIUM 10 MG/1
10 TABLET, COATED ORAL
Qty: 30 TABLET | Refills: 1 | Status: SHIPPED | OUTPATIENT
Start: 2022-01-20 | End: 2022-03-12 | Stop reason: SDUPTHER

## 2022-01-25 NOTE — PROGRESS NOTES
HPI: Narayan Tracy (: 1970) is a 46 y.o. male, patient, here for evaluation of the following chief complaint(s):    Hand Pain (discuss MRI middle fingers )  Patient seen today to evaluate his hands. He works as a  and reports that he injured his hands while at home lifting a heavy lawnmower out of a box in 2021. He felt an immediate pop and pain involving each middle finger at the metacarpal phalangeal joint. He thinks he had temporary subluxation near dislocation of these MP joints that self reduced. He has been able to restore range of motion but he has pain when he applies force stress testing the radial collateral ligament of the right more than the left middle finger and is seen for further treatment. No true digital clicking or locking. Vitals:  Ht 5' 6\" (1.676 m)   Wt 185 lb (83.9 kg)   BMI 29.86 kg/m²    Body mass index is 29.86 kg/m². Allergies   Allergen Reactions    Cymbalta [Duloxetine] Other (comments)     Erectile dysfunction      Prozac [Fluoxetine] Other (comments)     Decreased libido and erection       Current Outpatient Medications   Medication Sig    rosuvastatin (CRESTOR) 10 mg tablet Take 1 Tablet by mouth nightly.  krill oil 500 mg cap Take  by mouth.  OTHER Using cbd oil for sleep    ibuprofen (MOTRIN) 200 mg tablet Take  by mouth. No current facility-administered medications for this visit. Past Medical History:   Diagnosis Date    Anxiety state, unspecified     Basal cell carcinoma     Calculus of kidney 2014    Left 6mm. Left 1 mm. Dr. Tim Garcia. 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 Dr. Etienne Avilez.  Other and unspecified hyperlipidemia     Shin splints 08    Dr. Ignacio Kelly. Gastoc Equines    Shoulder pain 01    Right. Tendinitis. Dr. Juliana Gandara    Tinnitus     Right.  Dr. Wilfrid Stanley        Past Surgical History:   Procedure Laterality Date    HX LITHOTRIPSY 08/2014    Left. Dr. Nohelia Shelby.  HX OTHER SURGICAL  1996    birth lizzeth removed from Rt buttock    HX REFRACTIVE SURGERY  12/2005    HX SKIN BIOPSY  2012    Dr. Melissa Hawkins from left back of leg    HX TONSILLECTOMY      HX UROLOGICAL  08/2014, 09/2014    Left KIDNEY STENT THEN REMOVAL. DUE TO KIDNEY STONES. Dr. Uma Carpenter.  HX WISDOM TEETH EXTRACTION      NERVE BLOCK  07/2010    C3-4, C4-5. Dr. Dharmesh Mitchell. Family History   Problem Relation Age of Onset    Hypertension Mother     OSTEOARTHRITIS Mother     High Cholesterol Mother     Cancer Father         melanoma, ?lung        Social History     Tobacco Use    Smoking status: Former Smoker     Packs/day: 0.50     Years: 24.00     Pack years: 12.00     Types: Cigarettes    Smokeless tobacco: Current User    Tobacco comment: Vapor   Substance Use Topics    Alcohol use: Yes     Alcohol/week: 5.0 - 8.3 standard drinks     Types: 6 - 10 Cans of beer per week     Comment: occasional use    Drug use: No        Review of Systems   All other systems reviewed and are negative. ROS     Positive for: Musculoskeletal    Last edited by Kwesi Ocampo LPN on 9/27/7890  7:10 AM. (History)             Physical Exam    Both hands have full range of motion but he does have tenderness to the radial aspect of the metacarpal phalangeal joints of each middle finger. Stress testing aggravated pain but did not seem to increase gapping. Full extension and flexion of MP PIP and DIP joints. No instability. No cyst.    Examination of the right wrist reveals no swelling, edema or warmth, no tenderness to palpation, no pain, normal strength and tone, normal range of motion, no crepitus, normal sensation, no instability or laxity and no known fractures or deformities.   Examination of the left wrist reveals no swelling, edema or warmth, no tenderness to palpation, no pain, normal strength and tone, normal range of motion, no crepitus, normal sensation, no instability or laxity and no known fractures or deformities. Imaging:    XR Results (maximum last 3): Results from Appointment encounter on 11/03/21    XR 3RD FINGER LT MIN 2 V    Narrative  EXAM: XR 3RD FINGER LT MIN 2 V    INDICATION: Left third finger pain after injury lifting a box in June, 2021. COMPARISON: None. FINDINGS: Three views of the left third finger demonstrate no fracture or other  acute osseous or articular abnormality. The soft tissues are within normal  limits. 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 Tiny osteophytes. No  erosion or periosteal reaction. 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 No evidence of inflammatory  arthritis. MRI Results (most recent):  Results from Appointment encounter on 01/18/22    MRI FINGER/HAND JT LT WO CONT    Narrative  EXAM:  MRI FINGER/HAND JT LT WO CONT    INDICATION:  Sprain of MCP joint of left middle finger. COMPARISON: Radiographs 11/3/2021    TECHNIQUE: Axial, coronal, and sagittal MRI of the left middle finger in the T1,  T2, and inversion-recovery pulse sequences with and without fat saturation . CONTRAST: None. FINDINGS: Bone marrow: Small subchondral cysts and trace degenerative edema are  seen in the middle finger metacarpal head. No fracture, dislocation, or marrow  replacing process. Left middle finger MCP joint: The radial and ulnar collateral ligaments are  intact. Joint fluid:  No significant joint effusion. Tendons: Intact. Muscles: Within normal limits. Neurovascular bundles: Within normal limits. Articular cartilage: Small subchondral cysts are present in the third metacarpal  head. Soft tissue mass: None. Impression  Small subchondral cysts and trace degenerative edema in the third metacarpal  head.     Study Result    Narrative & Impression   EXAM:  MRI FINGER/HAND JT RT WO CONT     INDICATION:  Sprain of MCP joint of right middle finger.     COMPARISON: Radiographs 11/3/2021     TECHNIQUE: Axial, coronal, and sagittal MRI of the right middle finger in the  T1, T2, and inversion-recovery pulse sequences with and without fat saturation .     CONTRAST: None.     FINDINGS: Bone marrow: There is a small subchondral cyst and mild degenerative  edema in the middle finger metacarpal head. No fracture, dislocation, or marrow  replacing process.      Right middle finger MCP joint: The radial and ulnar collateral ligaments are  intact.     Joint fluid:  No significant joint effusion.     Tendons: Intact.     Muscles: Within normal limits.     Neurovascular bundles: Within normal limits.     Articular cartilage: There is a small subchondral cyst in the third metacarpal  head.     Soft tissue mass: None.     IMPRESSION  Small subchondral cyst and mild edema in the third metacarpal head. ASSESSMENT/PLAN:  Below is the assessment and plan developed based on review of pertinent history, physical exam, labs, studies, and medications. Patient examination was consistent with at least chronic sprains if not ruptures of the radial collateral ligament at each middle finger metacarpal phalangeal joint. His injury lifting a lawnmower out of a box occurred in June 2021. He has treated conservatively and has been more than 6 months and not fully healed. We spoke about injections and further diagnostic intervention. MRI studies of each middle finger to evaluate for radiocarpal ligament tears at the metacarpal phalangeal joints or otherwise negative only showed some small subchondral cyst in each metacarpal head indicative of some mild degree of arthritis. This is good news and there is no evidence of any complete tear. 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 There is no instability. He may continue with motion and strength to tolerance and return anytime for further treatment.      1. Sprain of metacarpophalangeal (MCP) joint of right middle finger, subsequent encounter  2. Sprain of metacarpophalangeal (MCP) joint of left middle finger, subsequent encounter  3. Bilateral hand pain      Return in about 4 weeks (around 2/23/2022). An electronic signature was used to authenticate this note.   -- Dede Arora MD

## 2022-01-26 ENCOUNTER — OFFICE VISIT (OUTPATIENT)
Dept: ORTHOPEDIC SURGERY | Age: 52
End: 2022-01-26
Payer: COMMERCIAL

## 2022-01-26 VITALS — BODY MASS INDEX: 29.73 KG/M2 | HEIGHT: 66 IN | WEIGHT: 185 LBS

## 2022-01-26 DIAGNOSIS — M79.642 BILATERAL HAND PAIN: ICD-10-CM

## 2022-01-26 DIAGNOSIS — M79.641 BILATERAL HAND PAIN: ICD-10-CM

## 2022-01-26 DIAGNOSIS — S63.652D SPRAIN OF METACARPOPHALANGEAL (MCP) JOINT OF RIGHT MIDDLE FINGER, SUBSEQUENT ENCOUNTER: Primary | ICD-10-CM

## 2022-01-26 DIAGNOSIS — S63.653D SPRAIN OF METACARPOPHALANGEAL (MCP) JOINT OF LEFT MIDDLE FINGER, SUBSEQUENT ENCOUNTER: ICD-10-CM

## 2022-01-26 PROCEDURE — 99213 OFFICE O/P EST LOW 20 MIN: CPT | Performed by: ORTHOPAEDIC SURGERY

## 2022-01-26 NOTE — LETTER
1/26/2022    Patient: Cara Colbert   YOB: 1970   Date of Visit: 1/26/2022     Timothy Arriola MD  [address] [facility] [address]  [place]    Dear Timothy Arriola MD,      Thank you for referring Mr. Nicola Blanchard to [facility] for evaluation. My notes for this consultation are attached. If you have questions, please do not hesitate to call me. I look forward to following your patient along with you.       Sincerely,    Akila Davis MD

## 2022-03-14 RX ORDER — ROSUVASTATIN CALCIUM 10 MG/1
10 TABLET, COATED ORAL
Qty: 30 TABLET | Refills: 1 | Status: SHIPPED | OUTPATIENT
Start: 2022-03-14 | End: 2022-05-09 | Stop reason: SDUPTHER

## 2022-03-19 PROBLEM — F33.9 RECURRENT DEPRESSION (HCC): Status: ACTIVE | Noted: 2018-01-04

## 2022-05-09 RX ORDER — ROSUVASTATIN CALCIUM 10 MG/1
10 TABLET, COATED ORAL
Qty: 30 TABLET | Refills: 1 | Status: SHIPPED | OUTPATIENT
Start: 2022-05-09 | End: 2022-08-04 | Stop reason: SDUPTHER

## 2022-07-29 NOTE — PROGRESS NOTES
Neurology Note    Patient ID:  Elizabeth Farah  [id number]  84 y.o.  1970      Date of Consultation:  August 1, 2022      Subjective: I still have cramping. History of Present Illness:   Elizabeth Farah is a 46 y.o. male who returns to the neurology clinic at [facility] for evaluation. He was last seen in the clinic on June 1, 2021. Please see my history of present illness, examination, and treatment base plan from that day. He was being seen for neck and intermittent, diffuse muscle pain. His workup to date included serology performed which revealed a low vitamin D level and he was placed on supplementation. Follow up labs have revealed a normal level. He did have an EMG/nerve conduction study performed on July 10, 2020. There is evidence of a chronic right C6-C7 radiculopathy with no evidence of active denervation. He was placed on baclofen to help with his spasticity. He has tried both magnesium and baclofen which he did not feel helped much, but did cause increasing gi symptoms. Cramping was still occurring in multiple muscles. Since that time, he does notice intermittent cramping that does occur. He still does have neck pain and sees a chiropractor now on a regular basis. He notices intermittent cramping in his pectoralis, latissimus, triceps. Occasionally he will notice twitching in his right arm. It can be random. He is still able to perform all of his activities as a . 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 He did gain a significant mount of weight and is on a new diet which has caused him to lose 25 pounds. He is doing well on that. He denies any difficulty with vision, hearing, speech, or swallowing.     The only new medical condition that did arise is that he did have kidney stones and required lithotripsy    Past Medical History: Diagnosis Date    Anxiety state, unspecified     Basal cell carcinoma     Calculus of kidney 8/17/2014    Left 6mm. Left 1 mm. Dr. Jurgen Faustin. Depression     Neck pain 2010    C3-4, C4-5 arthropathy. Dr. Nunu Flowers. Other and unspecified hyperlipidemia     Thurman splints 04/24/08    Dr. Tomasita Hodgkins. Gastoc Equines    Shoulder pain 05/16/01    Right. Tendinitis. Dr. Marisabel Brown    Tinnitus 2007    Right. Dr. Kalyn Samuels        Past Surgical History:   Procedure Laterality Date    HX LITHOTRIPSY  08/2014    Left. Dr. Jurgen Faustin. HX OTHER SURGICAL  1996    birth lizzeth removed from Rt buttock    HX REFRACTIVE SURGERY  12/2005    HX SKIN BIOPSY  2012    Dr. Christal Lawson from left back of leg    HX TONSILLECTOMY      HX UROLOGICAL  08/2014, 09/2014    Left KIDNEY STENT THEN REMOVAL. DUE TO KIDNEY STONES. Dr. Eve Rawls. HX WISDOM TEETH EXTRACTION      NERVE BLOCK  07/2010    C3-4, C4-5. Dr. Cruz Treviño. Family History   Problem Relation Age of Onset    Hypertension Mother     OSTEOARTHRITIS Mother     High Cholesterol Mother     Cancer Father         melanoma, ?lung        Social History     Tobacco Use    Smoking status: Former     Packs/day: 0.50     Years: 24.00     Pack years: 12.00     Types: Cigarettes    Smokeless tobacco: Current    Tobacco comments:     Vapor   Substance Use Topics    Alcohol use: Yes     Alcohol/week: 5.0 - 8.3 standard drinks     Types: 6 - 10 Cans of beer per week     Comment: occasional use        Allergies   Allergen Reactions    Cymbalta [Duloxetine] Other (comments)     Erectile dysfunction      Prozac [Fluoxetine] Other (comments)     Decreased libido and erection        Prior to Admission medications    Medication Sig Start Date End Date Taking? Authorizing Provider   multivitamin (ONE A DAY) tablet Take 1 Tablet by mouth in the morning.    Yes Provider, Historical   calcium carbonate/vitamin D3 (CALCIUM 500 + D PO)    Yes Provider, Historical omega-3/dha/epa/fish oil (OMEGA-3 FISH OIL PO)    Yes Provider, Historical   rosuvastatin (CRESTOR) 10 mg tablet Take 1 Tablet by mouth nightly. 5/9/22  Yes Lou Quiñonez MD   krill oil 500 mg cap Take  by mouth. Yes Provider, Historical   OTHER Using cbd oil for sleep   Yes Provider, Historical   ibuprofen (MOTRIN) 200 mg tablet Take 200 mg by mouth every six (6) hours as needed. Yes Provider, Historical       Review of Systems:    General, constitutional: negative  Eyes, vision: negative  Ears, nose, throat: negative  Cardiovascular, heart: negative  Respiratory: negative  Gastrointestinal: negative  Genitourinary: negative  Musculoskeletal: negative  Skin and integumentary: negative  Psychiatric: negative  Endocrine: negative  Neurological: negative, except for HPI  Hematologic/lymphatic: negative  Allergy/immunology: negative      Objective:     Visit Vitals  /64 (BP 1 Location: Left upper arm, BP Patient Position: Sitting, BP Cuff Size: Large adult)   Pulse 63   Temp 98.1 °F (36.7 °C) (Oral)   Resp 18   Ht 5' 6\" (1.676 m)   Wt 173 lb (78.5 kg)   SpO2 98%   BMI 27.92 kg/m²         Physical Exam:    General:  appears well nourished in no acute distress  Neck: no carotid bruits  Lungs: clear to auscultation  Heart:  no murmurs, regular rate  Lower extremity: peripheral pulses palpable and no edema  Skin: intact    Neurological exam:    Awake, alert, oriented to person, place and time  Recent and remote memory were normal  Attention and concentration were intact  Language was intact. There was no aphasia  Speech: no dysarthria  Fund of knowledge was preserved    Cranial nerves:   II-XII were tested    Perrrla  Visual fields were full  Eomi, no evidence of nystagmus  Facial sensation:  normal and symmetric  Facial motor: normal and symmetric  Hearing intact  SCM strength intact  Tongue: midline without fasciculations    Motor:    Tone normal  Pronator drift was absent    No evidence of fasciculations    Strength testing:   deltoid triceps biceps Wrist ext. Wrist flex. intrinsics Hip flex. Hip ext. Knee ext. Knee flex Dorsi flex Plantar flex   Right 5 5 5 5 5 5 5 5 5 5 5 5   Left 5 5 5 5 5 5 5 5 5 5 5 5       Sensory:  Upper extremity: Vibration was symmetric. Possible mild decrease to pinprick in his right hand in a nondermatomal pattern  Lower extremity: intact to pp, light touch, and vibration > 10 seconds    Reflexes:    Right Left  Biceps  2 2  Triceps             2 2  Brachiorad. 2 2  Patella  2 2  Achilles 2 2    Cerebellar testing:  no tremor apparent, finger/nose and tico were intact      Gait: steady. Labs:     Lab Results   Component Value Date/Time    Hemoglobin A1c 5.6 04/08/2020 04:48 PM    Sodium 139 04/12/2021 02:46 PM    Potassium 4.2 04/12/2021 02:46 PM    Chloride 104 04/12/2021 02:46 PM    Glucose 85 04/12/2021 02:46 PM    BUN 16 04/12/2021 02:46 PM    Creatinine 0.90 04/12/2021 02:46 PM    Calcium 9.4 04/12/2021 02:46 PM    WBC 7.4 04/12/2021 02:46 PM    HCT 43.4 04/12/2021 02:46 PM    HGB 14.0 04/12/2021 02:46 PM    PLATELET 646 74/05/6441 02:46 PM       Imaging:    Results from Appointment encounter on 01/18/22    MRI FINGER/HAND JT LT WO CONT    Narrative  EXAM:  MRI FINGER/HAND JT LT WO CONT    INDICATION:  Sprain of MCP joint of left middle finger. COMPARISON: Radiographs 11/3/2021    TECHNIQUE: Axial, coronal, and sagittal MRI of the left middle finger in the T1,  T2, and inversion-recovery pulse sequences with and without fat saturation . CONTRAST: None. FINDINGS: Bone marrow: Small subchondral cysts and trace degenerative edema are  seen in the middle finger metacarpal head. No fracture, dislocation, or marrow  replacing process. Left middle finger MCP joint: The radial and ulnar collateral ligaments are  intact. Joint fluid:  No significant joint effusion. Tendons: Intact. Muscles: Within normal limits.     Neurovascular bundles: Within normal limits. Articular cartilage: Small subchondral cysts are present in the third metacarpal  head. Soft tissue mass: None. Impression  Small subchondral cysts and trace degenerative edema in the third metacarpal  head. Results from [place] encounter on 06/12/19    CT ABD PELV WO CONT    Narrative  INDICATION:  Unspecified abdominal pain  Flank pain, recurrent stone disease  suspected    EXAM: CT Abdomen and Pelvis without IV contrast. No oral contrast.  CT dose reduction was achieved through use of a standardized protocol tailored  for this examination and automatic exposure control for dose modulation. FINDINGS:  There is a punctate nonobstructing stone in each kidney. No other urinary tract  stones are seen. There is no hydroureteronephrosis. The kidneys are normal in  size. There is no perirenal fluid or ascites. Liver shows no apparent significant finding without contrast. Pancreas, adrenal  glands, spleen and aorta show no significant enlargement. There is mild  aortoiliac atherosclerotic calcification. There are a few colonic diverticula. No inflammation is seen. There is no  pneumoperitoneum or significant adenopathy. There is mild prostatomegaly. The bladder is not distended. The distal ureters  are not dilated. There is no apparent pelvic mass. The appendix is normal.  Bowels are nondilated. There is no significant stool. Impression  IMPRESSION: No acute disease. Nonobstructive nephrolithiasis. Assessment and Plan:    The patient is a pleasant 46year-old gentleman with a multiple year history of  pain in his neck with cramping and stiffness. He has a normal neurological examination. Chronic cervical radiculopathy with no concern of active denervation. Discussed the importance of conservative management including stretching exercises. Muscle relaxants were not helpful. He is not interested in additional medication at this time.   He does continue with chiropractic work. If there is new weakness, numbness or tingling, he will notify me. Vitamin D deficiency:  Now normalized. Continue multivitamin. Cramping: This is most likely combination of his chronic cervical radiculopathy, degenerative spine disease, and a benign cramp fasciculation syndrome. He was unable to tolerate magnesium due to GI side effects. I did reassure him that his neurological examination is normal today. Patient Active Problem List   Diagnosis Code    Vertigo R42    Allergic rhinitis J30.9    Shoulder pain M25.519    Neck pain M54.2    Other and unspecified hyperlipidemia E78.5    Depression F32. A    Tinnitus H93.19    Calculus of kidney N20.0    Recurrent depression ([facility] [address] F33.9      The patient should return to clinic in one year    Renewed medication: none today    I spent  25  minutes on the day of the encounter preparing the office visit by reviewing medical records, obtaining a history, performing examination, counseling and educating the patient and on diagnosis, documenting in the clinical medical record, and coordinating the care for the patient. The patient had the ability to ask questions and all questions were answered.                  Signed By:  Guilherme Fitch DO FAAN    August 1, 2022

## 2022-08-01 ENCOUNTER — OFFICE VISIT (OUTPATIENT)
Dept: NEUROLOGY | Age: 52
End: 2022-08-01
Payer: COMMERCIAL

## 2022-08-01 VITALS
OXYGEN SATURATION: 98 % | HEIGHT: 66 IN | DIASTOLIC BLOOD PRESSURE: 64 MMHG | SYSTOLIC BLOOD PRESSURE: 100 MMHG | WEIGHT: 173 LBS | BODY MASS INDEX: 27.8 KG/M2 | HEART RATE: 63 BPM | TEMPERATURE: 98.1 F | RESPIRATION RATE: 18 BRPM

## 2022-08-01 DIAGNOSIS — M54.2 NECK PAIN: ICD-10-CM

## 2022-08-01 DIAGNOSIS — R25.2 MUSCLE CRAMPING: ICD-10-CM

## 2022-08-01 DIAGNOSIS — M54.12 CERVICAL RADICULOPATHY AT C6: Primary | ICD-10-CM

## 2022-08-01 PROCEDURE — 99213 OFFICE O/P EST LOW 20 MIN: CPT | Performed by: PSYCHIATRY & NEUROLOGY

## 2022-08-01 RX ORDER — BISMUTH SUBSALICYLATE 262 MG
1 TABLET,CHEWABLE ORAL DAILY
COMMUNITY

## 2022-08-01 NOTE — PROGRESS NOTES
Chief Complaint   Patient presents with    Muscle Pain     Annual follow up - continues with muscle pain - having pain on left side of neck      1. Have you been to the ER, urgent care clinic since your last visit? Hospitalized since your last visit? Yes [facility] on [address] for kidney stones     2. Have you seen or consulted any other health care providers outside of the [address] [place] [address] since your last visit? Include any pap smears or colon screening.   Yes [address] [facility] urology for lithotripsy

## 2022-08-01 NOTE — LETTER
8/1/2022    Patient: Wendi Stuart   YOB: 1970   Date of Visit: 8/1/2022     Gillian Weaver MD  [address] [facility] [address]  [place] [facility] [address]    Dear Gillian Weaver MD,      Thank you for referring Mr. Sima Knowles to [address] [place] [address] for evaluation. My notes for this consultation are attached. If you have questions, please do not hesitate to call me. I look forward to following your patient along with you.       Sincerely,    Jeffery Sanchez, DO

## 2022-08-15 RX ORDER — ROSUVASTATIN CALCIUM 10 MG/1
10 TABLET, COATED ORAL
Qty: 30 TABLET | Refills: 1 | Status: SHIPPED | OUTPATIENT
Start: 2022-08-15 | End: 2022-09-12 | Stop reason: SDUPTHER

## 2022-09-12 RX ORDER — ROSUVASTATIN CALCIUM 10 MG/1
10 TABLET, COATED ORAL
Qty: 30 TABLET | Refills: 1 | Status: SHIPPED | OUTPATIENT
Start: 2022-09-12

## 2023-06-19 ENCOUNTER — OFFICE VISIT (OUTPATIENT)
Facility: CLINIC | Age: 53
End: 2023-06-19
Payer: COMMERCIAL

## 2023-06-19 VITALS
DIASTOLIC BLOOD PRESSURE: 69 MMHG | HEIGHT: 66 IN | SYSTOLIC BLOOD PRESSURE: 109 MMHG | WEIGHT: 172 LBS | BODY MASS INDEX: 27.64 KG/M2 | TEMPERATURE: 98.1 F | OXYGEN SATURATION: 98 % | HEART RATE: 68 BPM | RESPIRATION RATE: 16 BRPM

## 2023-06-19 DIAGNOSIS — N43.3 HYDROCELE IN ADULT: ICD-10-CM

## 2023-06-19 DIAGNOSIS — M54.12 CERVICAL RADICULOPATHY: ICD-10-CM

## 2023-06-19 DIAGNOSIS — Z00.00 VISIT FOR WELL MAN HEALTH CHECK: Primary | ICD-10-CM

## 2023-06-19 DIAGNOSIS — H04.302 DACRYOCYSTITIS OF LEFT LACRIMAL SAC: ICD-10-CM

## 2023-06-19 DIAGNOSIS — R94.30 LOW LEFT VENTRICULAR EJECTION FRACTION: ICD-10-CM

## 2023-06-19 DIAGNOSIS — E78.00 HYPERCHOLESTEREMIA: ICD-10-CM

## 2023-06-19 PROCEDURE — 99214 OFFICE O/P EST MOD 30 MIN: CPT | Performed by: FAMILY MEDICINE

## 2023-06-19 PROCEDURE — 99396 PREV VISIT EST AGE 40-64: CPT | Performed by: FAMILY MEDICINE

## 2023-06-19 RX ORDER — ROSUVASTATIN CALCIUM 10 MG/1
10 TABLET, COATED ORAL DAILY
Qty: 90 TABLET | Refills: 2 | Status: SHIPPED | OUTPATIENT
Start: 2023-06-19

## 2023-06-19 SDOH — ECONOMIC STABILITY: HOUSING INSECURITY
IN THE LAST 12 MONTHS, WAS THERE A TIME WHEN YOU DID NOT HAVE A STEADY PLACE TO SLEEP OR SLEPT IN A SHELTER (INCLUDING NOW)?: NO

## 2023-06-19 SDOH — ECONOMIC STABILITY: FOOD INSECURITY: WITHIN THE PAST 12 MONTHS, THE FOOD YOU BOUGHT JUST DIDN'T LAST AND YOU DIDN'T HAVE MONEY TO GET MORE.: NEVER TRUE

## 2023-06-19 SDOH — ECONOMIC STABILITY: INCOME INSECURITY: HOW HARD IS IT FOR YOU TO PAY FOR THE VERY BASICS LIKE FOOD, HOUSING, MEDICAL CARE, AND HEATING?: NOT HARD AT ALL

## 2023-06-19 SDOH — ECONOMIC STABILITY: FOOD INSECURITY: WITHIN THE PAST 12 MONTHS, YOU WORRIED THAT YOUR FOOD WOULD RUN OUT BEFORE YOU GOT MONEY TO BUY MORE.: NEVER TRUE

## 2023-06-19 ASSESSMENT — PATIENT HEALTH QUESTIONNAIRE - PHQ9
2. FEELING DOWN, DEPRESSED OR HOPELESS: 0
1. LITTLE INTEREST OR PLEASURE IN DOING THINGS: 0
SUM OF ALL RESPONSES TO PHQ9 QUESTIONS 1 & 2: 0
SUM OF ALL RESPONSES TO PHQ QUESTIONS 1-9: 0

## 2023-06-19 NOTE — PROGRESS NOTES
Chief Complaint   Patient presents with    Annual Exam     Patient is here for a wellness visit. he is a 46y.o. year old male who presents for CPE. Complete Physical Exam Questions:    1. Do you follow a low fat diet?  no  2. Are you up to date on your Tdap (<10 years)? Yes  3. Have you ever had a Pneumovax vaccine (>65)? No   PCV13 No   PPSV23 No  4. Have you had Zoster vaccine (>60)? No  5. Have you had the HPV - Gardasil (13- 26)? Not applicable  6. Do you follow an exercise program?  Yes and No  7. Do you smoke? Yes If > 65 and smoker, have you had a abdominal aortic aneurysm ultrasound screen? No  8. Do you consider yourself overweight? No  9. Is there a family history of CAD< age 48? No  10. Is there a family history of Cancer? Yes  11. Do you know your Cancer risks? Yes  12. Have you had a colonoscopy? Yes  13. Have you been tested for HIV or other STI's? No HIV today(18-64 y/o)? Yes   14. Have you had an EKG in the last five years(>50)? Yes  15. Have you had a PSA test done this year (50-69)? No    Other complaints: none    Reviewed and agree with Nurse Note and duplicated in this note. Reviewed PmHx, RxHx, FmHx, SocHx, AllgHx and updated and dated in the chart. Family History   Problem Relation Age of Onset    Osteoarthritis Mother     Cancer Father         melanoma, ?lung    High Cholesterol Mother     Hypertension Mother        Past Medical History:   Diagnosis Date    Anxiety state, unspecified     Basal cell carcinoma     Calculus of kidney 8/17/2014    Left 6mm. Left 1 mm. Dr. Yousif Escobar. Depression     Neck pain 2010    C3-4, C4-5 arthropathy. Dr. Yeny Pate. Other and unspecified hyperlipidemia     Recinos splints 04/24/08    Dr. Vieyra Members. Gastoc Equines    Shoulder pain 05/16/01    Right. Tendinitis. Dr. Zakia Shipley    Tinnitus 2007    Right.  Dr. Kinsey April History     Socioeconomic History    Marital status: Single   Tobacco Use

## 2023-06-20 DIAGNOSIS — M54.12 CERVICAL RADICULOPATHY: Primary | ICD-10-CM

## 2023-06-20 LAB
ALBUMIN SERPL-MCNC: 4.7 G/DL (ref 3.8–4.9)
ALBUMIN/GLOB SERPL: 2.4 {RATIO} (ref 1.2–2.2)
ALP SERPL-CCNC: 103 IU/L (ref 44–121)
ALT SERPL-CCNC: 14 IU/L (ref 0–44)
AST SERPL-CCNC: 17 IU/L (ref 0–40)
BASOPHILS # BLD AUTO: 0 X10E3/UL (ref 0–0.2)
BASOPHILS NFR BLD AUTO: 0 %
BILIRUB SERPL-MCNC: 0.8 MG/DL (ref 0–1.2)
BUN SERPL-MCNC: 16 MG/DL (ref 6–24)
BUN/CREAT SERPL: 17 (ref 9–20)
CALCIUM SERPL-MCNC: 9.5 MG/DL (ref 8.7–10.2)
CHLORIDE SERPL-SCNC: 102 MMOL/L (ref 96–106)
CHOLEST SERPL-MCNC: 233 MG/DL (ref 100–199)
CO2 SERPL-SCNC: 27 MMOL/L (ref 20–29)
CREAT SERPL-MCNC: 0.93 MG/DL (ref 0.76–1.27)
EGFRCR SERPLBLD CKD-EPI 2021: 99 ML/MIN/1.73
EOSINOPHIL # BLD AUTO: 0.1 X10E3/UL (ref 0–0.4)
EOSINOPHIL NFR BLD AUTO: 2 %
ERYTHROCYTE [DISTWIDTH] IN BLOOD BY AUTOMATED COUNT: 13.2 % (ref 11.6–15.4)
GLOBULIN SER CALC-MCNC: 2 G/DL (ref 1.5–4.5)
GLUCOSE SERPL-MCNC: 88 MG/DL (ref 70–99)
HCT VFR BLD AUTO: 41.2 % (ref 37.5–51)
HDLC SERPL-MCNC: 37 MG/DL
HGB BLD-MCNC: 14 G/DL (ref 13–17.7)
HIV 1+2 AB+HIV1 P24 AG SERPL QL IA: NON REACTIVE
IMM GRANULOCYTES # BLD AUTO: 0 X10E3/UL (ref 0–0.1)
IMM GRANULOCYTES NFR BLD AUTO: 0 %
LDLC SERPL CALC-MCNC: 176 MG/DL (ref 0–99)
LYMPHOCYTES # BLD AUTO: 1.8 X10E3/UL (ref 0.7–3.1)
LYMPHOCYTES NFR BLD AUTO: 24 %
MCH RBC QN AUTO: 29.7 PG (ref 26.6–33)
MCHC RBC AUTO-ENTMCNC: 34 G/DL (ref 31.5–35.7)
MCV RBC AUTO: 87 FL (ref 79–97)
MONOCYTES # BLD AUTO: 0.4 X10E3/UL (ref 0.1–0.9)
MONOCYTES NFR BLD AUTO: 5 %
NEUTROPHILS # BLD AUTO: 5.1 X10E3/UL (ref 1.4–7)
NEUTROPHILS NFR BLD AUTO: 69 %
PLATELET # BLD AUTO: 250 X10E3/UL (ref 150–450)
POTASSIUM SERPL-SCNC: 4.2 MMOL/L (ref 3.5–5.2)
PROT SERPL-MCNC: 6.7 G/DL (ref 6–8.5)
PSA SERPL-MCNC: 0.9 NG/ML (ref 0–4)
RBC # BLD AUTO: 4.72 X10E6/UL (ref 4.14–5.8)
SODIUM SERPL-SCNC: 140 MMOL/L (ref 134–144)
TRIGL SERPL-MCNC: 108 MG/DL (ref 0–149)
VLDLC SERPL CALC-MCNC: 20 MG/DL (ref 5–40)
WBC # BLD AUTO: 7.5 X10E3/UL (ref 3.4–10.8)

## 2023-06-22 ENCOUNTER — TELEPHONE (OUTPATIENT)
Facility: CLINIC | Age: 53
End: 2023-06-22

## 2023-06-22 NOTE — TELEPHONE ENCOUNTER
The neurology dept that was referred to patient doesn't treat general pain and he would need a different provider that can help patient. Thank you!!  Neurology called and gave this information

## 2023-08-15 NOTE — PROGRESS NOTES
Neurology Note    Patient ID:  Raghu Mehta  [id number]  60 y.o.  1970        Subjective: I still have cramping and muscle pain. History of Present Illness:   Raghu Mehta is a 46 y.o. male who returns to the neurology clinic at [facility] for evaluation. He was last seen in the clinic on August 1, 2022. Please see my history of present illness, examination, and treatment base plan from that day. He was being seen for neck and intermittent, diffuse muscle pain. His workup to date included serology performed which revealed a low vitamin D level and he was placed on supplementation. Follow up labs have revealed a normal level. He did have an EMG/nerve conduction study performed on July 10, 2020. There is evidence of a chronic right C6-C7 radiculopathy with no evidence of active denervation. He was placed on baclofen to help with his spasticity. He has tried both magnesium and baclofen which he did not feel helped much, but did cause increasing gi symptoms. Cramping was still occurring in multiple muscles. Since that time, did sprain his ankle. 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 Still with cramping in his arm. Not preventing him from doing his activities. He also has noticed more pain in his neck/shoulder and collar bone. Some left sided neck pain - less so now. He feels that his right arm is smaller. He denies any difficulty with vision, hearing, speech, or swallowing. Past Medical History:   Diagnosis Date    Anxiety state, unspecified     Basal cell carcinoma     Calculus of kidney 8/17/2014    Left 6mm. Left 1 mm. Dr. Shweta Judge. Depression     Neck pain 2010    C3-4, C4-5 arthropathy. Dr. Efe Baird. Other and unspecified hyperlipidemia     Recinos splints 04/24/08    Dr. Tova Walls. Gastoc Equines    Shoulder pain 05/16/01    Right. Tendinitis.   Dr. Jem Mueller

## 2023-08-16 ENCOUNTER — OFFICE VISIT (OUTPATIENT)
Age: 53
End: 2023-08-16
Payer: COMMERCIAL

## 2023-08-16 VITALS
OXYGEN SATURATION: 96 % | RESPIRATION RATE: 16 BRPM | HEART RATE: 78 BPM | SYSTOLIC BLOOD PRESSURE: 102 MMHG | DIASTOLIC BLOOD PRESSURE: 70 MMHG | WEIGHT: 170 LBS | HEIGHT: 66 IN | BODY MASS INDEX: 27.32 KG/M2

## 2023-08-16 DIAGNOSIS — M54.2 CERVICALGIA: Primary | ICD-10-CM

## 2023-08-16 DIAGNOSIS — M54.12 RADICULOPATHY, CERVICAL REGION: ICD-10-CM

## 2023-08-16 DIAGNOSIS — R25.2 CRAMP AND SPASM: ICD-10-CM

## 2023-08-16 PROCEDURE — 99214 OFFICE O/P EST MOD 30 MIN: CPT | Performed by: PSYCHIATRY & NEUROLOGY

## 2023-08-16 RX ORDER — VALACYCLOVIR HYDROCHLORIDE 1 G/1
TABLET, FILM COATED ORAL
COMMUNITY
Start: 2023-07-25

## 2023-08-16 RX ORDER — CHLORAL HYDRATE 500 MG
CAPSULE ORAL DAILY
COMMUNITY

## 2023-08-16 RX ORDER — DICLOFENAC SODIUM 75 MG/1
75 TABLET, DELAYED RELEASE ORAL 2 TIMES DAILY
COMMUNITY
Start: 2023-08-07

## 2023-08-16 ASSESSMENT — PATIENT HEALTH QUESTIONNAIRE - PHQ9
2. FEELING DOWN, DEPRESSED OR HOPELESS: 0
1. LITTLE INTEREST OR PLEASURE IN DOING THINGS: 0
SUM OF ALL RESPONSES TO PHQ QUESTIONS 1-9: 0
SUM OF ALL RESPONSES TO PHQ9 QUESTIONS 1 & 2: 0
SUM OF ALL RESPONSES TO PHQ QUESTIONS 1-9: 0

## 2023-08-16 NOTE — PROGRESS NOTES
1. Have you been to the ER, urgent care clinic since your last visit? Hospitalized since your last visit? No.    2. Have you seen or consulted any other health care providers outside of the [address] [place] [address] Avenue since your last visit? Include any pap smears or colon screening. Seen by Ortho on call for rt foot.         Chief Complaint   Patient presents with    Neurologic Problem    Neck Pain     On and off

## 2023-11-30 ENCOUNTER — OFFICE VISIT (OUTPATIENT)
Facility: CLINIC | Age: 53
End: 2023-11-30
Payer: COMMERCIAL

## 2023-11-30 VITALS
TEMPERATURE: 97.7 F | WEIGHT: 177.2 LBS | DIASTOLIC BLOOD PRESSURE: 77 MMHG | HEART RATE: 71 BPM | SYSTOLIC BLOOD PRESSURE: 122 MMHG | RESPIRATION RATE: 16 BRPM | BODY MASS INDEX: 28.48 KG/M2 | OXYGEN SATURATION: 96 % | HEIGHT: 66 IN

## 2023-11-30 DIAGNOSIS — E78.00 HYPERCHOLESTEREMIA: ICD-10-CM

## 2023-11-30 DIAGNOSIS — M25.571 ACUTE RIGHT ANKLE PAIN: ICD-10-CM

## 2023-11-30 DIAGNOSIS — M25.811 IMPINGEMENT OF RIGHT SHOULDER: Primary | ICD-10-CM

## 2023-11-30 DIAGNOSIS — M25.812 IMPINGEMENT OF LEFT SHOULDER: ICD-10-CM

## 2023-11-30 PROCEDURE — 20610 DRAIN/INJ JOINT/BURSA W/O US: CPT | Performed by: FAMILY MEDICINE

## 2023-11-30 PROCEDURE — 99214 OFFICE O/P EST MOD 30 MIN: CPT | Performed by: FAMILY MEDICINE

## 2023-11-30 RX ORDER — TRIAMCINOLONE ACETONIDE 40 MG/ML
40 INJECTION, SUSPENSION INTRA-ARTICULAR; INTRAMUSCULAR ONCE
Status: COMPLETED | OUTPATIENT
Start: 2023-11-30 | End: 2023-11-30

## 2023-11-30 RX ADMIN — TRIAMCINOLONE ACETONIDE 40 MG: 40 INJECTION, SUSPENSION INTRA-ARTICULAR; INTRAMUSCULAR at 11:30

## 2023-11-30 ASSESSMENT — PATIENT HEALTH QUESTIONNAIRE - PHQ9
8. MOVING OR SPEAKING SO SLOWLY THAT OTHER PEOPLE COULD HAVE NOTICED. OR THE OPPOSITE, BEING SO FIGETY OR RESTLESS THAT YOU HAVE BEEN MOVING AROUND A LOT MORE THAN USUAL: 0
SUM OF ALL RESPONSES TO PHQ9 QUESTIONS 1 & 2: 0
1. LITTLE INTEREST OR PLEASURE IN DOING THINGS: 0
SUM OF ALL RESPONSES TO PHQ QUESTIONS 1-9: 0
6. FEELING BAD ABOUT YOURSELF - OR THAT YOU ARE A FAILURE OR HAVE LET YOURSELF OR YOUR FAMILY DOWN: 0
3. TROUBLE FALLING OR STAYING ASLEEP: 0
4. FEELING TIRED OR HAVING LITTLE ENERGY: 0
5. POOR APPETITE OR OVEREATING: 0
SUM OF ALL RESPONSES TO PHQ QUESTIONS 1-9: 0
7. TROUBLE CONCENTRATING ON THINGS, SUCH AS READING THE NEWSPAPER OR WATCHING TELEVISION: 0
9. THOUGHTS THAT YOU WOULD BE BETTER OFF DEAD, OR OF HURTING YOURSELF: 0
2. FEELING DOWN, DEPRESSED OR HOPELESS: 0
SUM OF ALL RESPONSES TO PHQ QUESTIONS 1-9: 0
SUM OF ALL RESPONSES TO PHQ QUESTIONS 1-9: 0
10. IF YOU CHECKED OFF ANY PROBLEMS, HOW DIFFICULT HAVE THESE PROBLEMS MADE IT FOR YOU TO DO YOUR WORK, TAKE CARE OF THINGS AT HOME, OR GET ALONG WITH OTHER PEOPLE: 0

## 2023-11-30 NOTE — PROGRESS NOTES
Chief Complaint   Patient presents with    Shoulder Pain     Patient is here for a both shoulder pain      he is a 48y.o. year old male who presents for evaluation of right shoulder pain   Pain Assessment Encounter      Hailey Corral  11/30/2023  Onset of Symptoms: Patient states he has had pain for months   ________________________________________________________________________  Description:Patient states no injures     Frequency: 4-5 times a day  Pain Scale:(1-10): 4  Trauma Hx: none  Hx of similar symptoms: No:   Radiation: Yes, shoulder pain   Duration:  continuous      Progression: has worsened  What makes it better?: exercise and OTC meds  What makes it worse?:strecthing  Medications tried: acetaminophen    Reviewed and agree with Nurse Note and duplicated in this note. Reviewed PmHx, RxHx, FmHx, SocHx, AllgHx and updated and dated in the chart. Family History   Problem Relation Age of Onset    Osteoarthritis Mother     Cancer Father         melanoma, ?lung    High Cholesterol Mother     Hypertension Mother        Past Medical History:   Diagnosis Date    Anxiety state, unspecified     Basal cell carcinoma     Calculus of kidney 8/17/2014    Left 6mm. Left 1 mm. Dr. Viral Perrin. Depression     Neck pain 2010    C3-4, C4-5 arthropathy. Dr. Teddy Christianson. Other and unspecified hyperlipidemia     Recinos splints 04/24/08    Dr. Deepti Euceda. Gastoc Equines    Shoulder pain 05/16/01    Right. Tendinitis. Dr. Jake Mosley    Tinnitus 2007    Right.  Dr. Cosby Simple History     Socioeconomic History    Marital status: Single   Tobacco Use    Smoking status: Former     Packs/day: .5     Types: Cigarettes    Smokeless tobacco: Never   Vaping Use    Vaping Use: Every day    Substances: Nicotine    Devices: Refillable tank   Substance and Sexual Activity    Alcohol use: Yes     Comment: occ    Drug use: No     Social Determinants of Health     Financial Resource Strain: Low Risk

## 2023-11-30 NOTE — PATIENT INSTRUCTIONS
license by [facility] ([facility]). If you have questions about a medical condition or this instruction, always ask your healthcare professional. [address] any warranty or liability for your use of this information.

## 2023-12-01 LAB
ALBUMIN SERPL-MCNC: 4.7 G/DL (ref 3.8–4.9)
ALBUMIN/GLOB SERPL: 2.2 {RATIO} (ref 1.2–2.2)
ALP SERPL-CCNC: 99 IU/L (ref 44–121)
ALT SERPL-CCNC: 23 IU/L (ref 0–44)
AST SERPL-CCNC: 19 IU/L (ref 0–40)
BILIRUB SERPL-MCNC: 0.6 MG/DL (ref 0–1.2)
BUN SERPL-MCNC: 11 MG/DL (ref 6–24)
BUN/CREAT SERPL: 11 (ref 9–20)
CALCIUM SERPL-MCNC: 9.5 MG/DL (ref 8.7–10.2)
CHLORIDE SERPL-SCNC: 101 MMOL/L (ref 96–106)
CHOLEST SERPL-MCNC: 168 MG/DL (ref 100–199)
CO2 SERPL-SCNC: 23 MMOL/L (ref 20–29)
CREAT SERPL-MCNC: 1.04 MG/DL (ref 0.76–1.27)
EGFRCR SERPLBLD CKD-EPI 2021: 86 ML/MIN/1.73
GLOBULIN SER CALC-MCNC: 2.1 G/DL (ref 1.5–4.5)
GLUCOSE SERPL-MCNC: 80 MG/DL (ref 70–99)
HDLC SERPL-MCNC: 36 MG/DL
LDLC SERPL CALC-MCNC: 105 MG/DL (ref 0–99)
POTASSIUM SERPL-SCNC: 4.3 MMOL/L (ref 3.5–5.2)
PROT SERPL-MCNC: 6.8 G/DL (ref 6–8.5)
SODIUM SERPL-SCNC: 140 MMOL/L (ref 134–144)
TRIGL SERPL-MCNC: 149 MG/DL (ref 0–149)
VLDLC SERPL CALC-MCNC: 27 MG/DL (ref 5–40)

## 2023-12-11 ENCOUNTER — HOSPITAL ENCOUNTER (OUTPATIENT)
Dept: PHYSICAL THERAPY | Facility: HOSPITAL | Age: 53
Setting detail: RECURRING SERIES
Discharge: HOME | End: 2023-12-14
Payer: COMMERCIAL

## 2023-12-11 PROCEDURE — 97161 PT EVAL LOW COMPLEX 20 MIN: CPT

## 2023-12-11 NOTE — THERAPY EVALUATION
Physical Therapy at [facility],   a part of [address] [place] [address] [address] [facility], [address]  Phone: [phone]  Fax: [phone]           PHYSICAL THERAPY - MEDICARE EVALUATION/PLAN OF CARE NOTE (updated 3/23)      Date: 2023          Patient Name:  Job Ball :  1970   Medical   Diagnosis:  Impingement of right shoulder [M25.811]  Acute right ankle pain [M25.571] Treatment Diagnosis:  M25.511  RIGHT SHOULDER PAIN    Referral Source:  Don Centeno MD Provider #:  [id number]                Insurance: Payor: Galina Vega / Plan: Lottie Metcalf / Product Type: *No Product type* /      Patient  verified yes     Visit #   Current  / Total 1 5   Time   In / Out 1:15P 2:05P   Total Treatment Time 50   Total Timed Codes 7   1:1 Treatment Time 7      [facility] Totals Reminder:  bill using total billable   min of TIMED therapeutic procedures and modalities. 8-22 min = 1 unit; 23-37 min = 2 units; 38-52 min = 3 units;  53-67 min = 4 units; 68-82 min = 5 units           SUBJECTIVE  Pain Level (0-10 scale): 4/10  []constant []intermittent []improving []worsening []no change since onset    Any medication changes, allergies to medications, adverse drug reactions, diagnosis change, or new procedure performed?: [x] No    [] Yes (see summary sheet for update)  Medications: Verified on Patient Summary List    Subjective functional status/changes: The pt reports that he has had issues with neck for past 12 years ago. Last 5-6 months right shoulder pain has increased. He has increased pain with pressure on collarbone and shoulder increases pain the pt reports a lot of popping. He is  and requires use of his shoulder at all times. Also, does dryer vent cleaning and throwing ladders. Pain is present from the time he wakes up. The pt is able to sleep on his right shoulder, but is recently sleeping more on left side.      The pt has

## 2023-12-21 ENCOUNTER — HOSPITAL ENCOUNTER (OUTPATIENT)
Dept: PHYSICAL THERAPY | Facility: HOSPITAL | Age: 53
Setting detail: RECURRING SERIES
Discharge: HOME | End: 2023-12-24
Payer: COMMERCIAL

## 2023-12-21 PROCEDURE — 97140 MANUAL THERAPY 1/> REGIONS: CPT

## 2023-12-21 PROCEDURE — 97112 NEUROMUSCULAR REEDUCATION: CPT

## 2023-12-21 PROCEDURE — 97110 THERAPEUTIC EXERCISES: CPT

## 2023-12-21 NOTE — PROGRESS NOTES
PHYSICAL THERAPY - MEDICARE DAILY TREATMENT NOTE (updated 3/23)      Date: 2023          Patient Name:  Craolin Guardado :  1970   Medical   Diagnosis:  Impingement of right shoulder [M25.811]  Acute right ankle pain [M25.571] Treatment Diagnosis:  M25.511  RIGHT SHOULDER PAIN    Referral Source:  Leila Joe MD Insurance:   Payor: [facility] Single / Plan: nprogresss / Product Type: *No Product type* /                     Patient  verified yes     Visit #   Current  / Total 2 5   Time   In / Out 3:45P 4:55P   Total Treatment Time 70   Total Timed Codes 70   1:1 Treatment Time 59      [facility] Totals Reminder:  bill using total billable   min of TIMED therapeutic procedures and modalities. 8-22 min = 1 unit; 23-37 min = 2 units; 38-52 min = 3 units; 53-67 min = 4 units; 68-82 min = 5 units            SUBJECTIVE    Pain Level (0-10 scale): 2-3/10    Any medication changes, allergies to medications, adverse drug reactions, diagnosis change, or new procedure performed?: [x] No    [] Yes (see summary sheet for update)  Medications: Verified on Patient Summary List    Subjective functional status/changes:     Pt reports no pain at rest. Does have discomfort across anterior shoulder and into pec with scap squeezes. OBJECTIVE      Therapeutic Procedures: Tx Min Billable or 1:1 Min (if diff from Tx Min) Procedure, Rationale, Specifics   [id number] Therapeutic Exercise (timed):  increase ROM, strength, coordination, balance, and proprioception to improve patient's ability to progress to PLOF and address remaining functional goals.  (see flow sheet as applicable)     Details if applicable:  PROM shoulder flexion and ER   15 [id number] Neuromuscular Re-Education (timed):  improve balance, coordination, kinesthetic sense, posture, core stability and proprioception to improve patient's ability to develop conscious control of individual muscles and awareness of position of extremities in order to

## 2023-12-26 ENCOUNTER — APPOINTMENT (OUTPATIENT)
Dept: PHYSICAL THERAPY | Facility: HOSPITAL | Age: 53
End: 2023-12-26
Payer: COMMERCIAL

## 2023-12-28 ENCOUNTER — HOSPITAL ENCOUNTER (OUTPATIENT)
Dept: PHYSICAL THERAPY | Facility: HOSPITAL | Age: 53
Setting detail: RECURRING SERIES
Discharge: HOME | End: 2023-12-31
Payer: COMMERCIAL

## 2023-12-28 PROCEDURE — 97110 THERAPEUTIC EXERCISES: CPT

## 2023-12-28 PROCEDURE — 97112 NEUROMUSCULAR REEDUCATION: CPT

## 2023-12-28 PROCEDURE — 97140 MANUAL THERAPY 1/> REGIONS: CPT

## 2023-12-28 NOTE — PROGRESS NOTES
Frequency / Duration: Patient to be seen 1-2 times per week for 6  treatments.      PLAN  Yes  Continue plan of care  Re-Cert Due: 6 visits  [x]  Upgrade activities as tolerated  []  Discharge due to :  []  Other:      Yohana Gregory, PT       12/28/2023       10:21 AM

## 2024-01-09 ENCOUNTER — HOSPITAL ENCOUNTER (OUTPATIENT)
Dept: PHYSICAL THERAPY | Facility: HOSPITAL | Age: 54
Setting detail: RECURRING SERIES
Discharge: HOME | End: 2024-01-12
Payer: COMMERCIAL

## 2024-01-09 PROCEDURE — 97110 THERAPEUTIC EXERCISES: CPT

## 2024-01-09 PROCEDURE — 97112 NEUROMUSCULAR REEDUCATION: CPT

## 2024-01-09 PROCEDURE — 97140 MANUAL THERAPY 1/> REGIONS: CPT

## 2024-01-09 NOTE — PROGRESS NOTES
PHYSICAL THERAPY - MEDICARE DAILY TREATMENT NOTE (updated 3/23)      Date: 2024          Patient Name:  Monty Reyes :  1970   Medical   Diagnosis:  Impingement of right shoulder [M25.811]  Acute right ankle pain [M25.571] Treatment Diagnosis:  M25.511  RIGHT SHOULDER PAIN    Referral Source:  Alvarado Paul MD Insurance:   Payor: [facility] / Plan: [facility] [employer]EEPERS / Product Type: *No Product type* /                     Patient  verified yes     Visit #   Current  / Total 4 5   Time   In / Out 8:30A 9:45A   Total Treatment Time 75   Total Timed Codes 65   1:1 Treatment Time 60      [facility] Totals Reminder:  bill using total billable   min of TIMED therapeutic procedures and modalities.   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 Still reporting cramping in his lat, triceps, forearm and calf muscles    OBJECTIVE      Therapeutic Procedures:  Tx Min Billable or 1:1 Min (if diff from Tx Min) Procedure, Rationale, Specifics   40 35 13571 Therapeutic Exercise (timed):  increase ROM, strength, coordination, balance, and proprioception to improve patient's ability to progress to PLOF and address remaining functional goals. (see flow sheet as applicable)     Details if applicable:  PROM shoulder flexion and ER   15 15 06532 Neuromuscular Re-Education (timed):  improve balance, coordination, kinesthetic sense, posture, core stability and proprioception to improve patient's ability to develop conscious

## 2024-01-18 ENCOUNTER — HOSPITAL ENCOUNTER (OUTPATIENT)
Dept: PHYSICAL THERAPY | Facility: HOSPITAL | Age: 54
Setting detail: RECURRING SERIES
Discharge: HOME | End: 2024-01-21
Payer: COMMERCIAL

## 2024-01-18 PROCEDURE — 97112 NEUROMUSCULAR REEDUCATION: CPT

## 2024-01-18 PROCEDURE — 97110 THERAPEUTIC EXERCISES: CPT

## 2024-01-18 PROCEDURE — 97140 MANUAL THERAPY 1/> REGIONS: CPT

## 2024-01-18 NOTE — PROGRESS NOTES
PHYSICAL THERAPY - MEDICARE DAILY TREATMENT NOTE (updated 3/23)      Date: 2024          Patient Name:  Monty Reyes :  1970   Medical   Diagnosis:  Impingement of right shoulder [M25.811]  Acute right ankle pain [M25.571] Treatment Diagnosis:  M25.511  RIGHT SHOULDER PAIN    Referral Source:  Alvarado Paul MD Insurance:   Payor: [facility] / Plan: [facility] [employer]KEEPERS / Product Type: *No Product type* /                     Patient  verified yes     Visit #   Current  / Total 5 5   Time   In / Out 10:20P 11:36A   Total Treatment Time 76   Total Timed Codes 76   1:1 Treatment Time 71      [facility] Totals Reminder:  bill using total billable   min of TIMED therapeutic procedures and modalities.   8-22 min = 1 unit; 23-37 min = 2 units; 38-52 min = 3 units; 53-67 min = 4 units; 68-82 min = 5 units            SUBJECTIVE    Pain Level (0-10 scale): 2-3/10    Any medication changes, allergies to medications, adverse drug reactions, diagnosis change, or new procedure performed?: [x] No    [] Yes (see summary sheet for update)  Medications: Verified on Patient Summary List    Subjective functional status/changes:     Pt reports L shoulder is feeling tight this morning     OBJECTIVE      Therapeutic Procedures:  Tx Min Billable or 1:1 Min (if diff from Tx Min) Procedure, Rationale, Specifics   46 41 29177 Therapeutic Exercise (timed):  increase ROM, strength, coordination, balance, and proprioception to improve patient's ability to progress to PLOF and address remaining functional goals. (see flow sheet as applicable)     Details if applicable:  PROM shoulder flexion and ER   15 15 03791 Neuromuscular Re-Education (timed):  improve balance, coordination, kinesthetic sense, posture, core stability and proprioception to improve patient's ability to develop conscious control of individual muscles and awareness of position of extremities in order to progress to PLOF and address remaining functional goals.

## 2024-01-23 ENCOUNTER — OFFICE VISIT (OUTPATIENT)
Facility: CLINIC | Age: 54
End: 2024-01-23
Payer: COMMERCIAL

## 2024-01-23 VITALS
HEIGHT: 66 IN | DIASTOLIC BLOOD PRESSURE: 77 MMHG | WEIGHT: 178 LBS | OXYGEN SATURATION: 98 % | TEMPERATURE: 98 F | BODY MASS INDEX: 28.61 KG/M2 | SYSTOLIC BLOOD PRESSURE: 115 MMHG | HEART RATE: 76 BPM | RESPIRATION RATE: 16 BRPM

## 2024-01-23 DIAGNOSIS — M25.811 IMPINGEMENT OF RIGHT SHOULDER: Primary | ICD-10-CM

## 2024-01-23 DIAGNOSIS — M54.2 NECK PAIN ON RIGHT SIDE: ICD-10-CM

## 2024-01-23 PROCEDURE — 99214 OFFICE O/P EST MOD 30 MIN: CPT | Performed by: FAMILY MEDICINE

## 2024-01-23 RX ORDER — OFLOXACIN 3 MG/ML
5 SOLUTION AURICULAR (OTIC) 2 TIMES DAILY
Qty: 5 ML | Refills: 0 | Status: SHIPPED | OUTPATIENT
Start: 2024-01-23 | End: 2024-02-02

## 2024-01-23 SDOH — ECONOMIC STABILITY: TRANSPORTATION INSECURITY
IN THE PAST 12 MONTHS, HAS LACK OF TRANSPORTATION KEPT YOU FROM MEETINGS, WORK, OR FROM GETTING THINGS NEEDED FOR DAILY LIVING?: NO

## 2024-01-23 SDOH — ECONOMIC STABILITY: TRANSPORTATION INSECURITY
IN THE PAST 12 MONTHS, HAS THE LACK OF TRANSPORTATION KEPT YOU FROM MEDICAL APPOINTMENTS OR FROM GETTING MEDICATIONS?: NO

## 2024-01-23 SDOH — ECONOMIC STABILITY: FOOD INSECURITY: WITHIN THE PAST 12 MONTHS, YOU WORRIED THAT YOUR FOOD WOULD RUN OUT BEFORE YOU GOT MONEY TO BUY MORE.: NEVER TRUE

## 2024-01-23 SDOH — ECONOMIC STABILITY: FOOD INSECURITY: WITHIN THE PAST 12 MONTHS, THE FOOD YOU BOUGHT JUST DIDN'T LAST AND YOU DIDN'T HAVE MONEY TO GET MORE.: NEVER TRUE

## 2024-01-23 SDOH — ECONOMIC STABILITY: HOUSING INSECURITY: IN THE LAST 12 MONTHS, HOW MANY PLACES HAVE YOU LIVED?: 1

## 2024-01-23 SDOH — ECONOMIC STABILITY: INCOME INSECURITY: IN THE LAST 12 MONTHS, WAS THERE A TIME WHEN YOU WERE NOT ABLE TO PAY THE MORTGAGE OR RENT ON TIME?: NO

## 2024-01-23 NOTE — PROGRESS NOTES
follow-up.    Diagnoses and all orders for this visit:    Impingement of right shoulder  -     [facility] - Physical Therapy at the [facility]  -     External Referral To Chiropractic    Neck pain on right side  -     [facility] - Physical Therapy at the [facility]  -     External Referral To Chiropractic    Other orders  -     ofloxacin (FLOXIN) 0.3 % otic solution; Place 5 drops into the left ear 2 times daily for 10 days      Patient will continue to benefit from physical therapy, recommend extended PT  Return in about 6 weeks (around 3/5/2024) for Injury/Pain FU.    Pathophysiology, recovery and rehabilitation process discussed and questions answered   Counseling for 30 Minutes of the total visit duration   Pictures and figures used as necessary   Provided reassurance   Monitor response to Physical Therapy   Recommend activity modification   Recommend  lower impact activities-walking, Eliptical, Nordic Track, cycling or swimming   Follow up in 4 week(s)              I have discussed the diagnosis with the patient and the intended plan as seen in the above orders.  The patient has received an after-visit summary and questions were answered concerning future plans.     Medication Side Effects and Warnings were discussed with patient,  Patient Labs were reviewed and or requested, and  Patient Past Records were reviewed and or requested  yes     Pt agrees to call or return to clinic and/or go to closest ER with any worsening of symptoms.  This may include, but not limited to increased fever (>100.4) with NSAIDS or Tylenol, increased edema, confusion, rash, worsening of presenting symptoms.    Please note that this dictation was completed with [employer], the computer voice recognition software.  Quite often unanticipated grammatical, syntax, homophones, and other interpretive errors are inadvertently transcribed by the computer software.  Please disregard these errors.  Please

## 2024-01-25 NOTE — PROGRESS NOTES
Physical Therapy at [facility],   a part of [address], [address]  [place] [address]  Phone: [phone]  Fax: [phone]      DISCHARGE SUMMARY  Patient Name: Monty Reyes : 1970   Treatment/Medical Diagnosis: Impingement of right shoulder [M25.811]  Acute right ankle pain [M25.571]   Referral Source: Alvarado Paul MD     Date of Initial Visit: 23 Attended Visits: 5 Missed Visits: 0     SUMMARY OF TREATMENT  Pt has been seen for 5 sessions s/p R shoulder impingement. Treatment has included therapeutic exercises, manual therapy, neuromuscular reeducation, modalities, pt education and HEP     CURRENT STATUS  Pt has made progress towards established goals but continues to report pain at end range shoulder IR and ABD.  Pt will be d/c from PT at this time secondary to insurance authorization.      FOTO 76 (59 on eval)    right Shoulder ROM:            AROM                                                               PROM              Flexion                         wnl (wnl on 24)                                          wnl (wnl on 24)              Abduction                    120  (wnl on 24)                                         wnl (wnl on 24)              IR                                 Hand to T12 (Hand to T12 on 24)              60 (75 on 24)              ER                               Hand to T4 (Hand to T6 on 24)                   wnl (wnl on 24)       Short Term Goals: To be accomplished in 3 treatments.  The patient will be independent with introductory HEP with no v.c. or tactile cuing by PT needed  Partially MET  The patient will improve right shoulder abduction AROM to 140 deg to improve ease with overhead reaching tasks MET  The patient will demonstrate pain free right shoulder IR AROM T10 to allow for doning and doffing of clothing without assistance needed MET

## 2024-02-06 ENCOUNTER — HOSPITAL ENCOUNTER (OUTPATIENT)
Dept: PHYSICAL THERAPY | Facility: HOSPITAL | Age: 54
Setting detail: RECURRING SERIES
Discharge: HOME | End: 2024-02-09
Payer: COMMERCIAL

## 2024-02-06 PROCEDURE — 97161 PT EVAL LOW COMPLEX 20 MIN: CPT

## 2024-02-06 NOTE — THERAPY EVALUATION
Physical Therapy at [facility],   a part of [address], [address]  [place] [address]  Phone: [phone]  Fax: [phone]           PHYSICAL THERAPY - MEDICARE EVALUATION/PLAN OF CARE NOTE (updated 3/23)      Date: 2024          Patient Name:  Monty Reyes :  1970   Medical   Diagnosis:  Impingement of right shoulder [M25.811]  Acute right ankle pain [M25.571] Treatment Diagnosis:  M25.511  RIGHT SHOULDER PAIN    Referral Source:  Alvarado Paul MD Provider #:  [id number]                Insurance: Payor: [facility] / Plan: [facility] [employer]EEPERS / Product Type: *No Product type* /      Patient  verified yes     Visit #   Current  / Total 1 5   Time   In / Out 12:15P 1:10P   Total Treatment Time 55   Total Timed Codes 10   1:1 Treatment Time 10       [facility] Totals Reminder:  bill using total billable   min of TIMED therapeutic procedures and modalities.   8-22 min = 1 unit; 23-37 min = 2 units; 38-52 min = 3 units;  53-67 min = 4 units; 68-82 min = 5 units           SUBJECTIVE  Pain Level (0-10 scale): 4/10  []constant []intermittent []improving []worsening []no change since onset    Any medication changes, allergies to medications, adverse drug reactions, diagnosis change, or new procedure performed?: [x] No    [] Yes (see summary sheet for update)  Medications: Verified on Patient Summary List    Subjective functional status/changes:     The pt reports that he has had issues with neck for past 12 years ago. Last 8 months right shoulder pain has increased. He has increased pain with pressure on collarbone and shoulder increases pain the pt reports a lot of popping. He is  and requires use of his shoulder at all times. Also, does dryer vent cleaning and throwing ladders. Aaron increases following lifting and weighted objects having to rest on his right shoulder. The pt is trying to get away from sleeping on his stomach.

## 2024-02-14 ENCOUNTER — HOSPITAL ENCOUNTER (OUTPATIENT)
Dept: PHYSICAL THERAPY | Facility: HOSPITAL | Age: 54
Setting detail: RECURRING SERIES
Discharge: HOME | End: 2024-02-17
Payer: COMMERCIAL

## 2024-02-14 PROCEDURE — 97140 MANUAL THERAPY 1/> REGIONS: CPT

## 2024-02-14 PROCEDURE — 97112 NEUROMUSCULAR REEDUCATION: CPT

## 2024-02-14 PROCEDURE — 97110 THERAPEUTIC EXERCISES: CPT

## 2024-02-14 NOTE — PROGRESS NOTES
PHYSICAL THERAPY - MEDICARE DAILY TREATMENT NOTE (updated 3/23)      Date: 2024          Patient Name:  Monty Reyes :  1970   Medical   Diagnosis:  Impingement of right shoulder [M25.811]  Acute right ankle pain [M25.571] Treatment Diagnosis:  M25.511  RIGHT SHOULDER PAIN    Referral Source:  Alvarado Paul MD Insurance:   Payor: [facility] / Plan: [facility] [employer]EEPERS / Product Type: *No Product type* /                     Patient  verified yes     Visit #   Current  / Total 2 5   Time   In / Out 8:37A 9:28A   Total Treatment Time 51   Total Timed Codes 51   1:1 Treatment Time 43      [facility] Totals Reminder:  bill using total billable   min of TIMED therapeutic procedures and modalities.   8-22 min = 1 unit; 23-37 min = 2 units; 38-52 min = 3 units; 53-67 min = 4 units; 68-82 min = 5 units            SUBJECTIVE    Pain Level (0-10 scale): 4/10    Any medication changes, allergies to medications, adverse drug reactions, diagnosis change, or new procedure performed?: [x] No    [] Yes (see summary sheet for update)  Medications: Verified on Patient Summary List    Subjective functional status/changes:     Pt reports he still had some pain and cracking in the shoulder.     OBJECTIVE      Therapeutic Procedures:  Tx Min Billable or 1:1 Min (if diff from Tx Min) Procedure, Rationale, Specifics   28 20 81421 Therapeutic Exercise (timed):  increase ROM, strength, coordination, balance, and proprioception to improve patient's ability to progress to PLOF and address remaining functional goals. (see flow sheet as applicable)     Details if applicable:  PROM R shoulder flexion and ER   8  89387 Neuromuscular Re-Education (timed):  improve balance, coordination, kinesthetic sense, posture, core stability and proprioception to improve patient's ability to develop conscious control of individual muscles and awareness of position of extremities in order to progress to PLOF and address remaining functional

## 2024-02-20 ENCOUNTER — HOSPITAL ENCOUNTER (OUTPATIENT)
Dept: PHYSICAL THERAPY | Facility: HOSPITAL | Age: 54
Setting detail: RECURRING SERIES
Discharge: HOME | End: 2024-02-23
Payer: COMMERCIAL

## 2024-02-20 PROCEDURE — 97112 NEUROMUSCULAR REEDUCATION: CPT

## 2024-02-20 PROCEDURE — 97140 MANUAL THERAPY 1/> REGIONS: CPT

## 2024-02-20 PROCEDURE — 97110 THERAPEUTIC EXERCISES: CPT

## 2024-02-20 NOTE — PROGRESS NOTES
PHYSICAL THERAPY - MEDICARE DAILY TREATMENT NOTE (updated 3/23)      Date: 2024          Patient Name:  Monty Reyes :  1970   Medical   Diagnosis:  Impingement of right shoulder [M25.811]  Acute right ankle pain [M25.571] Treatment Diagnosis:  M25.511  RIGHT SHOULDER PAIN    Referral Source:  Alvarado Paul MD Insurance:   Payor: [facility] / Plan: [facility] HEALTHKEEPERS / Product Type: *No Product type* /                     Patient  verified yes     Visit #   Current  / Total 3 5   Time   In / Out 7:45A 8:40A   Total Treatment Time 55   Total Timed Codes 55   1:1 Treatment Time 45      [facility] Totals Reminder:  bill using total billable   min of TIMED therapeutic procedures and modalities.   8-22 min = 1 unit; 23-37 min = 2 units; 38-52 min = 3 units; 53-67 min = 4 units; 68-82 min = 5 units            SUBJECTIVE    Pain Level (0-10 scale): 1-2/10    Any medication changes, allergies to medications, adverse drug reactions, diagnosis change, or new procedure performed?: [x] No    [] Yes (see summary sheet for update)  Medications: Verified on Patient Summary List    Subjective functional status/changes:     Pt reports he is tired today. Shoulder is sore from carrying a pack, but didn't have to do as much lifting bc he was a  on the call over night.     OBJECTIVE      Therapeutic Procedures:  Tx Min Billable or 1:1 Min (if diff from Tx Min) Procedure, Rationale, Specifics   30 20 70621 Therapeutic Exercise (timed):  increase ROM, strength, coordination, balance, and proprioception to improve patient's ability to progress to PLOF and address remaining functional goals. (see flow sheet as applicable)     Details if applicable:  PROM R shoulder flexion and ER   10 10 57410 Neuromuscular Re-Education (timed):  improve balance, coordination, kinesthetic sense, posture, core stability and proprioception to improve patient's ability to develop conscious control of individual muscles and

## 2024-02-29 ENCOUNTER — HOSPITAL ENCOUNTER (OUTPATIENT)
Dept: PHYSICAL THERAPY | Facility: HOSPITAL | Age: 54
Setting detail: RECURRING SERIES
End: 2024-02-29
Payer: COMMERCIAL

## 2024-02-29 PROCEDURE — 97112 NEUROMUSCULAR REEDUCATION: CPT

## 2024-02-29 PROCEDURE — 97016 VASOPNEUMATIC DEVICE THERAPY: CPT

## 2024-02-29 PROCEDURE — 97140 MANUAL THERAPY 1/> REGIONS: CPT

## 2024-02-29 PROCEDURE — 97110 THERAPEUTIC EXERCISES: CPT

## 2024-02-29 NOTE — PROGRESS NOTES
PHYSICAL THERAPY - MEDICARE DAILY TREATMENT NOTE (updated 3/23)      Date: 2024          Patient Name:  Monty Reyes :  1970   Medical   Diagnosis:  Impingement of right shoulder [M25.811]  Acute right ankle pain [M25.571] Treatment Diagnosis:  M25.511  RIGHT SHOULDER PAIN    Referral Source:  Alvarado Paul MD Insurance:   Payor: [facility] / Plan: [facility] [employer]KEEPERS / Product Type: *No Product type* /                     Patient  verified yes     Visit #   Current  / Total 4 7   Time   In / Out 7:52A 9:05A   Total Treatment Time 73   Total Timed Codes 58   1:1 Treatment Time 53      [facility] Totals Reminder:  bill using total billable   min of TIMED therapeutic procedures and modalities.   8-22 min = 1 unit; 23-37 min = 2 units; 38-52 min = 3 units; 53-67 min = 4 units; 68-82 min = 5 units            SUBJECTIVE    Pain Level (0-10 scale): 4/10    Any medication changes, allergies to medications, adverse drug reactions, diagnosis change, or new procedure performed?: [x] No    [] Yes (see summary sheet for update)  Medications: Verified on Patient Summary List    Subjective functional status/changes:     Pt reports he has had a rough few days. Has been able to do any exercises due to his strenuous work schedule. Increased pain in right shoulder and neck.     OBJECTIVE      Therapeutic Procedures:  Tx Min Billable or 1:1 Min (if diff from Tx Min) Procedure, Rationale, Specifics   18 13 11802 Therapeutic Exercise (timed):  increase ROM, strength, coordination, balance, and proprioception to improve patient's ability to progress to PLOF and address remaining functional goals. (see flow sheet as applicable)     Details if applicable:  PROM R shoulder flexion and ER   25 25 10208 Neuromuscular Re-Education (timed):  improve balance, coordination, kinesthetic sense, posture, core stability and proprioception to improve patient's ability to develop conscious control of individual muscles and

## 2024-03-04 ENCOUNTER — HOSPITAL ENCOUNTER (OUTPATIENT)
Dept: PHYSICAL THERAPY | Facility: HOSPITAL | Age: 54
Setting detail: RECURRING SERIES
Discharge: HOME | End: 2024-03-07
Payer: COMMERCIAL

## 2024-03-04 PROCEDURE — 97140 MANUAL THERAPY 1/> REGIONS: CPT

## 2024-03-04 PROCEDURE — 97112 NEUROMUSCULAR REEDUCATION: CPT

## 2024-03-04 PROCEDURE — 97110 THERAPEUTIC EXERCISES: CPT

## 2024-03-04 NOTE — PROGRESS NOTES
PHYSICAL THERAPY - MEDICARE DAILY TREATMENT NOTE (updated 3/23)      Date: 3/4/2024          Patient Name:  Monty Reyes :  1970   Medical   Diagnosis:  Impingement of right shoulder [M25.811]  Acute right ankle pain [M25.571] Treatment Diagnosis:  M25.511  RIGHT SHOULDER PAIN    Referral Source:  Alvarado Paul MD Insurance:   Payor: [facility] / Plan: [facility] HE[employer]KEEPERS / Product Type: *No Product type* /                     Patient  verified yes     Visit #   Current  / Total 5 7   Time   In / Out 9:17A 10:02A   Total Treatment Time 45   Total Timed Codes 45   1:1 Treatment Time 38      [facility] Totals Reminder:  bill using total billable   min of TIMED therapeutic procedures and modalities.   8-22 min = 1 unit; 23-37 min = 2 units; 38-52 min = 3 units; 53-67 min = 4 units; 68-82 min = 5 units            SUBJECTIVE    Pain Level (0-10 scale): 4/10    Any medication changes, allergies to medications, adverse drug reactions, diagnosis change, or new procedure performed?: [x] No    [] Yes (see summary sheet for update)  Medications: Verified on Patient Summary List    Subjective functional status/changes:     Pt reports feeling about the same.     OBJECTIVE      Therapeutic Procedures:  Tx Min Billable or 1:1 Min (if diff from Tx Min) Procedure, Rationale, Specifics   15 8 29304 Therapeutic Exercise (timed):  increase ROM, strength, coordination, balance, and proprioception to improve patient's ability to progress to PLOF and address remaining functional goals. (see flow sheet as applicable)     Details if applicable:  PROM R shoulder flexion and ER   15 15 01383 Neuromuscular Re-Education (timed):  improve balance, coordination, kinesthetic sense, posture, core stability and proprioception to improve patient's ability to develop conscious control of individual muscles and awareness of position of extremities in order to progress to PLOF and address remaining functional goals. (see flow sheet as

## 2024-03-06 ENCOUNTER — OFFICE VISIT (OUTPATIENT)
Facility: CLINIC | Age: 54
End: 2024-03-06
Payer: COMMERCIAL

## 2024-03-06 VITALS
RESPIRATION RATE: 16 BRPM | TEMPERATURE: 98.2 F | WEIGHT: 179 LBS | HEART RATE: 59 BPM | HEIGHT: 66 IN | SYSTOLIC BLOOD PRESSURE: 117 MMHG | BODY MASS INDEX: 28.77 KG/M2 | DIASTOLIC BLOOD PRESSURE: 75 MMHG | OXYGEN SATURATION: 96 %

## 2024-03-06 DIAGNOSIS — M25.511 CHRONIC RIGHT SHOULDER PAIN: Primary | ICD-10-CM

## 2024-03-06 DIAGNOSIS — G89.29 CHRONIC RIGHT SHOULDER PAIN: Primary | ICD-10-CM

## 2024-03-06 DIAGNOSIS — B35.3 TINEA PEDIS OF BOTH FEET: ICD-10-CM

## 2024-03-06 PROCEDURE — 99214 OFFICE O/P EST MOD 30 MIN: CPT | Performed by: FAMILY MEDICINE

## 2024-03-06 RX ORDER — TERBINAFINE HYDROCHLORIDE 250 MG/1
250 TABLET ORAL DAILY
Qty: 14 TABLET | Refills: 0 | Status: SHIPPED | OUTPATIENT
Start: 2024-03-06 | End: 2024-03-20

## 2024-03-06 ASSESSMENT — PATIENT HEALTH QUESTIONNAIRE - PHQ9
6. FEELING BAD ABOUT YOURSELF - OR THAT YOU ARE A FAILURE OR HAVE LET YOURSELF OR YOUR FAMILY DOWN: 0
SUM OF ALL RESPONSES TO PHQ9 QUESTIONS 1 & 2: 0
SUM OF ALL RESPONSES TO PHQ QUESTIONS 1-9: 0
3. TROUBLE FALLING OR STAYING ASLEEP: 0
9. THOUGHTS THAT YOU WOULD BE BETTER OFF DEAD, OR OF HURTING YOURSELF: 0
2. FEELING DOWN, DEPRESSED OR HOPELESS: 0
7. TROUBLE CONCENTRATING ON THINGS, SUCH AS READING THE NEWSPAPER OR WATCHING TELEVISION: 0
1. LITTLE INTEREST OR PLEASURE IN DOING THINGS: 0
4. FEELING TIRED OR HAVING LITTLE ENERGY: 0
10. IF YOU CHECKED OFF ANY PROBLEMS, HOW DIFFICULT HAVE THESE PROBLEMS MADE IT FOR YOU TO DO YOUR WORK, TAKE CARE OF THINGS AT HOME, OR GET ALONG WITH OTHER PEOPLE: 0
SUM OF ALL RESPONSES TO PHQ QUESTIONS 1-9: 0
5. POOR APPETITE OR OVEREATING: 0
8. MOVING OR SPEAKING SO SLOWLY THAT OTHER PEOPLE COULD HAVE NOTICED. OR THE OPPOSITE, BEING SO FIGETY OR RESTLESS THAT YOU HAVE BEEN MOVING AROUND A LOT MORE THAN USUAL: 0

## 2024-03-06 NOTE — PROGRESS NOTES
Chief Complaint   Patient presents with    Shoulder Pain     Patient is here for right shoulder pain.      he is a 53 y.o. year old male who presents for follow up of injury.     Follow Up Pain Assessment Encounter      Onset of Symptoms: Patient states he has had pain for months   ________________________________________________________________________  Description: Pain  is unchanged      Pain Scale:(1-10): 3  Duration:  continuous  Radiation: arm  What makes it better?:exercise, heat, and OTC meds  What makes it worse?:strecthing  Medications tried: acetaminophen  Modalities tried: PT         Reviewed and agree with Nurse Note and duplicated in this note.  Reviewed PmHx, RxHx, FmHx, SocHx, AllgHx and updated and dated in the chart.    Family History   Problem Relation Age of Onset    Osteoarthritis Mother     High Cholesterol Mother             Hypertension Mother     Cancer Father                Past Medical History:   Diagnosis Date    Anxiety state, unspecified     Basal cell carcinoma     Calculus of kidney 2014    Left 6mm.  Left 1 mm.  Dr. Damian Chauhan.    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  Dr. Russ Penn.    Other and unspecified hyperlipidemia     Recinos splints 08    Dr. Harris.  Gastoc Equines    Shoulder pain 01    Right.  Tendinitis.  Dr. Memo Aguilera    Tinnitus     Right. Dr. Josselin Krishnamurthy      Social History     Socioeconomic History    Marital status: Single    Number of children: 0    Highest education level: Some college, no degree   Tobacco Use    Smoking status: Former     Current packs/day: 0.50     Types: Cigarettes    Smokeless tobacco: Never   Vaping Use    Vaping Use: Every day    Substances: Nicotine    Devices: Refillable tank   Substance and Sexual Activity    Alcohol use: Yes     Comment: occ    Drug use: No    Sexual activity: Yes     Partners: Female     Birth control/protection: None     Social Determinants of Health

## 2024-03-12 ENCOUNTER — HOSPITAL ENCOUNTER (OUTPATIENT)
Dept: PHYSICAL THERAPY | Facility: HOSPITAL | Age: 54
Setting detail: RECURRING SERIES
Discharge: HOME | End: 2024-03-15
Payer: COMMERCIAL

## 2024-03-12 PROCEDURE — 97110 THERAPEUTIC EXERCISES: CPT

## 2024-03-12 PROCEDURE — 97112 NEUROMUSCULAR REEDUCATION: CPT

## 2024-03-12 PROCEDURE — 97140 MANUAL THERAPY 1/> REGIONS: CPT

## 2024-03-12 PROCEDURE — G0283 ELEC STIM OTHER THAN WOUND: HCPCS

## 2024-03-12 NOTE — PROGRESS NOTES
PHYSICAL THERAPY - MEDICARE DAILY TREATMENT NOTE (updated 3/23)      Date: 3/12/2024          Patient Name:  Monty Reyes :  1970   Medical   Diagnosis:  Impingement of right shoulder [M25.811]  Acute right ankle pain [M25.571] Treatment Diagnosis:  M25.511  RIGHT SHOULDER PAIN    Referral Source:  Alvarado Paul MD Insurance:   Payor: [facility] / Plan: [facility] [employer]ALTHKEEPERS / Product Type: *No Product type* /                     Patient  verified yes     Visit #   Current  / Total 6 7   Time   In / Out 9:33A 10:50A   Total Treatment Time 77   Total Timed Codes 62   1:1 Treatment Time 58      [facility] Totals Reminder:  bill using total billable   min of TIMED therapeutic procedures and modalities.   8-22 min = 1 unit; 23-37 min = 2 units; 38-52 min = 3 units; 53-67 min = 4 units; 68-82 min = 5 units            SUBJECTIVE    Pain Level (0-10 scale): 2-3/10    Any medication changes, allergies to medications, adverse drug reactions, diagnosis change, or new procedure performed?: [x] No    [] Yes (see summary sheet for update)  Medications: Verified on Patient Summary List    Subjective functional status/changes:     Pt reports that his lat feels locked up this morning. Getting an MRI next Wed.     OBJECTIVE      Therapeutic Procedures:  Tx Min Billable or 1:1 Min (if diff from Tx Min) Procedure, Rationale, Specifics   32 09 26110 Therapeutic Exercise (timed):  increase ROM, strength, coordination, balance, and proprioception to improve patient's ability to progress to PLOF and address remaining functional goals. (see flow sheet as applicable)     Details if applicable:  PROM R shoulder flexion and ER   15 15 39854 Neuromuscular Re-Education (timed):  improve balance, coordination, kinesthetic sense, posture, core stability and proprioception to improve patient's ability to develop conscious control of individual muscles and awareness of position of extremities in order to progress to PLOF and address

## 2024-03-14 ENCOUNTER — TELEMEDICINE (OUTPATIENT)
Facility: CLINIC | Age: 54
End: 2024-03-14
Payer: COMMERCIAL

## 2024-03-14 DIAGNOSIS — M54.2 NECK PAIN: Primary | ICD-10-CM

## 2024-03-14 DIAGNOSIS — S13.4XXA WHIPLASH INJURY TO NECK, INITIAL ENCOUNTER: ICD-10-CM

## 2024-03-14 PROCEDURE — 99214 OFFICE O/P EST MOD 30 MIN: CPT | Performed by: FAMILY MEDICINE

## 2024-03-14 NOTE — PROGRESS NOTES
reviewed and are negative.         Objective   Patient-Reported Vitals  No data recorded     Physical Exam  [INSTRUCTIONS:  \"[x]\" Indicates a positive item  \"[]\" Indicates a negative item  -- DELETE ALL ITEMS NOT EXAMINED]    Constitutional: [x] Appears well-developed and well-nourished [x] No apparent distress      [] Abnormal -     Mental status: [x] Alert and awake  [x] Oriented to person/place/time [x] Able to follow commands    [] Abnormal -     Eyes:   EOM    [x]  Normal    [] Abnormal -   Sclera  [x]  Normal    [] Abnormal -          Discharge [x]  None visible   [] Abnormal -     HENT: [x] Normocephalic, atraumatic  [] Abnormal -   [x] Mouth/Throat: Mucous membranes are moist    External Ears [x] Normal  [] Abnormal -    Neck: [x] No visualized mass [] Abnormal -     Pulmonary/Chest: [x] Respiratory effort normal   [x] No visualized signs of difficulty breathing or respiratory distress        [] Abnormal -      Musculoskeletal:   [x] Normal gait with no signs of ataxia         [x] Normal range of motion of neck        [] Abnormal -     Neurological:        [x] No Facial Asymmetry (Cranial nerve 7 motor function) (limited exam due to video visit)          [x] No gaze palsy        [] Abnormal -          Skin:        [x] No significant exanthematous lesions or discoloration noted on facial skin         [] Abnormal -            Psychiatric:       [x] Normal Affect [] Abnormal -        [x] No Hallucinations    Other pertinent observable physical exam findings:-             --Alvarado Paul MD

## 2024-03-20 ENCOUNTER — HOSPITAL ENCOUNTER (OUTPATIENT)
Facility: HOSPITAL | Age: 54
Discharge: HOME | End: 2024-03-23
Attending: FAMILY MEDICINE
Payer: COMMERCIAL

## 2024-03-20 DIAGNOSIS — M25.511 CHRONIC RIGHT SHOULDER PAIN: ICD-10-CM

## 2024-03-20 DIAGNOSIS — G89.29 CHRONIC RIGHT SHOULDER PAIN: ICD-10-CM

## 2024-03-20 PROCEDURE — 73221 MRI JOINT UPR EXTREM W/O DYE: CPT

## 2024-03-21 ENCOUNTER — HOSPITAL ENCOUNTER (OUTPATIENT)
Dept: PHYSICAL THERAPY | Facility: HOSPITAL | Age: 54
Setting detail: RECURRING SERIES
Discharge: HOME | End: 2024-03-24
Payer: COMMERCIAL

## 2024-03-21 PROCEDURE — 97140 MANUAL THERAPY 1/> REGIONS: CPT

## 2024-03-21 PROCEDURE — G0283 ELEC STIM OTHER THAN WOUND: HCPCS

## 2024-03-21 PROCEDURE — 97112 NEUROMUSCULAR REEDUCATION: CPT

## 2024-03-21 PROCEDURE — 97110 THERAPEUTIC EXERCISES: CPT
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
## 2024-04-03 ENCOUNTER — HOSPITAL ENCOUNTER (OUTPATIENT)
Dept: PHYSICAL THERAPY | Facility: HOSPITAL | Age: 54
Setting detail: RECURRING SERIES
Discharge: HOME | End: 2024-04-06
Payer: COMMERCIAL

## 2024-04-03 PROCEDURE — 97161 PT EVAL LOW COMPLEX 20 MIN: CPT
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
## 2024-04-08 ENCOUNTER — HOSPITAL ENCOUNTER (OUTPATIENT)
Dept: PHYSICAL THERAPY | Facility: HOSPITAL | Age: 54
Setting detail: RECURRING SERIES
Discharge: HOME | End: 2024-04-11
Payer: COMMERCIAL

## 2024-04-08 PROCEDURE — 97110 THERAPEUTIC EXERCISES: CPT

## 2024-04-08 PROCEDURE — 97016 VASOPNEUMATIC DEVICE THERAPY: CPT

## 2024-04-08 PROCEDURE — 97140 MANUAL THERAPY 1/> REGIONS: CPT

## 2024-04-08 PROCEDURE — 97112 NEUROMUSCULAR REEDUCATION: CPT
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
## 2024-04-17 ENCOUNTER — HOSPITAL ENCOUNTER (OUTPATIENT)
Dept: PHYSICAL THERAPY | Facility: HOSPITAL | Age: 54
Setting detail: RECURRING SERIES
Discharge: HOME | End: 2024-04-20
Payer: COMMERCIAL

## 2024-04-17 PROCEDURE — G0283 ELEC STIM OTHER THAN WOUND: HCPCS

## 2024-04-17 PROCEDURE — 97110 THERAPEUTIC EXERCISES: CPT

## 2024-04-17 PROCEDURE — 97112 NEUROMUSCULAR REEDUCATION: CPT

## 2024-04-17 PROCEDURE — 97140 MANUAL THERAPY 1/> REGIONS: CPT

## 2024-04-17 NOTE — PROGRESS NOTES
be seen 2 times per week for 12  treatments.         PLAN  Yes  Continue plan of care  Re-Cert Due: 6  [x]  Upgrade activities as tolerated  []  Discharge due to:  []  Other:      RAMONE MORALES PTA       4/17/2024       7:16 AM

## 2024-04-23 ENCOUNTER — HOSPITAL ENCOUNTER (OUTPATIENT)
Dept: PHYSICAL THERAPY | Facility: HOSPITAL | Age: 54
Setting detail: RECURRING SERIES
Discharge: HOME | End: 2024-04-26
Payer: COMMERCIAL

## 2024-04-23 PROCEDURE — 97112 NEUROMUSCULAR REEDUCATION: CPT

## 2024-04-23 PROCEDURE — 97140 MANUAL THERAPY 1/> REGIONS: CPT

## 2024-04-23 PROCEDURE — 97016 VASOPNEUMATIC DEVICE THERAPY: CPT

## 2024-04-23 PROCEDURE — 97110 THERAPEUTIC EXERCISES: CPT

## 2024-04-23 NOTE — PROGRESS NOTES
and awareness of position of extremities in order to progress to PLOF and address remaining functional goals. (see flow sheet as applicable)      Details if applicable:  prone scap squeezes, prone t, quad ped thoracic, serratus slides.    15 85 85490 Manual Therapy (timed):  decrease pain, increase ROM, increase tissue extensibility, decrease trigger points, and increase postural awareness to improve patient's ability to progress to PLOF and address remaining functional goals.  The manual therapy interventions were performed at a separate and distinct time from the therapeutic activities interventions . (see flow sheet as applicable)     Details if applicable:  R 1st rib MET pre exercises,  pec release, STM to right UT, scalenes, cervical distraction.                    58 53     Total Total         Modalities Rationale:     decrease inflammation, decrease pain, and increase tissue extensibility to improve patient's ability to progress to PLOF and address remaining functional goals.       min [] Estim Unattended,             type/location: seated/ R shoulder          w/ice    []  w/heat        min [] Estim Attended,             type/location:       []  w/ice   []  w/heat         []  w/US   []  TENS insruct            min []  Mechanical Traction,        type/lbs:        []  pro      []  sup           []  int       []  cont            []  before manual           []  after manual     min []  Ultrasound,         settings/location:      min  unbilled []  Ice     []  Heat            location/position:    10     min [x]  Vasopneumatic Device,      press/temp:   pre-treatment girth :    post-treatment girth :    measured at (landmark       location) :   If using vaso (only need to measure limb vaso being performed on)        min []  Other:      Skin assessment post-treatment (if applicable):    [x]  intact    []  redness- no adverse reaction                 []redness - adverse reaction:          [x]  Patient Education

## 2024-04-30 ENCOUNTER — OFFICE VISIT (OUTPATIENT)
Facility: CLINIC | Age: 54
End: 2024-04-30
Payer: COMMERCIAL

## 2024-04-30 VITALS
DIASTOLIC BLOOD PRESSURE: 84 MMHG | WEIGHT: 184 LBS | SYSTOLIC BLOOD PRESSURE: 139 MMHG | HEART RATE: 84 BPM | HEIGHT: 66 IN | OXYGEN SATURATION: 98 % | RESPIRATION RATE: 16 BRPM | BODY MASS INDEX: 29.57 KG/M2 | TEMPERATURE: 98 F

## 2024-04-30 DIAGNOSIS — M25.811 IMPINGEMENT OF RIGHT SHOULDER: Primary | ICD-10-CM

## 2024-04-30 DIAGNOSIS — S13.4XXA WHIPLASH INJURY TO NECK, INITIAL ENCOUNTER: ICD-10-CM

## 2024-04-30 DIAGNOSIS — F33.9 RECURRENT DEPRESSION (HCC): ICD-10-CM

## 2024-04-30 PROCEDURE — 99214 OFFICE O/P EST MOD 30 MIN: CPT | Performed by: FAMILY MEDICINE
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
## 2024-05-02 ENCOUNTER — HOSPITAL ENCOUNTER (OUTPATIENT)
Dept: PHYSICAL THERAPY | Facility: HOSPITAL | Age: 54
Setting detail: RECURRING SERIES
Discharge: HOME | End: 2024-05-05
Payer: COMMERCIAL

## 2024-05-02 PROCEDURE — 97140 MANUAL THERAPY 1/> REGIONS: CPT

## 2024-05-02 PROCEDURE — 97016 VASOPNEUMATIC DEVICE THERAPY: CPT

## 2024-05-02 PROCEDURE — 97110 THERAPEUTIC EXERCISES: CPT

## 2024-05-02 PROCEDURE — 97112 NEUROMUSCULAR REEDUCATION: CPT

## 2024-05-02 NOTE — PROGRESS NOTES
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      Frequency / Duration: Patient to be seen 2 times per week for 12  treatments.         PLAN  Yes  Continue plan of care  Re-Cert Due: 6  [x]  Upgrade activities as tolerated  []  Discharge due to:  []  Other:      Yohana Gregory, PT       5/2/2024       8:59 AM

## 2024-05-06 ENCOUNTER — HOSPITAL ENCOUNTER (OUTPATIENT)
Dept: PHYSICAL THERAPY | Facility: HOSPITAL | Age: 54
Setting detail: RECURRING SERIES
Discharge: HOME | End: 2024-05-09
Payer: COMMERCIAL

## 2024-05-06 PROCEDURE — 97110 THERAPEUTIC EXERCISES: CPT

## 2024-05-06 PROCEDURE — 97140 MANUAL THERAPY 1/> REGIONS: CPT

## 2024-05-06 PROCEDURE — 97016 VASOPNEUMATIC DEVICE THERAPY: CPT

## 2024-05-06 PROCEDURE — 97112 NEUROMUSCULAR REEDUCATION: CPT
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
## 2024-05-21 ENCOUNTER — HOSPITAL ENCOUNTER (OUTPATIENT)
Dept: PHYSICAL THERAPY | Facility: HOSPITAL | Age: 54
Setting detail: RECURRING SERIES
Discharge: HOME | End: 2024-05-24
Attending: FAMILY MEDICINE
Payer: COMMERCIAL

## 2024-05-21 PROCEDURE — 97161 PT EVAL LOW COMPLEX 20 MIN: CPT

## 2024-05-21 PROCEDURE — 97140 MANUAL THERAPY 1/> REGIONS: CPT

## 2024-05-21 PROCEDURE — 97110 THERAPEUTIC EXERCISES: CPT

## 2024-05-21 PROCEDURE — 97016 VASOPNEUMATIC DEVICE THERAPY: CPT
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
## 2024-06-03 ENCOUNTER — HOSPITAL ENCOUNTER (OUTPATIENT)
Dept: PHYSICAL THERAPY | Facility: HOSPITAL | Age: 54
Setting detail: RECURRING SERIES
Discharge: HOME | End: 2024-06-06
Attending: FAMILY MEDICINE
Payer: COMMERCIAL

## 2024-06-03 PROCEDURE — 97016 VASOPNEUMATIC DEVICE THERAPY: CPT

## 2024-06-03 PROCEDURE — 97112 NEUROMUSCULAR REEDUCATION: CPT

## 2024-06-03 PROCEDURE — 97140 MANUAL THERAPY 1/> REGIONS: CPT

## 2024-06-03 PROCEDURE — 97110 THERAPEUTIC EXERCISES: CPT
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
## 2024-06-06 ENCOUNTER — HOSPITAL ENCOUNTER (OUTPATIENT)
Dept: PHYSICAL THERAPY | Facility: HOSPITAL | Age: 54
Setting detail: RECURRING SERIES
Discharge: HOME | End: 2024-06-09
Attending: FAMILY MEDICINE
Payer: COMMERCIAL

## 2024-06-06 PROCEDURE — 97140 MANUAL THERAPY 1/> REGIONS: CPT

## 2024-06-06 PROCEDURE — 97112 NEUROMUSCULAR REEDUCATION: CPT

## 2024-06-06 PROCEDURE — 97016 VASOPNEUMATIC DEVICE THERAPY: CPT

## 2024-06-06 PROCEDURE — 97110 THERAPEUTIC EXERCISES: CPT

## 2024-06-06 NOTE — PROGRESS NOTES
extremities in order to progress to PLOF and address remaining functional goals. (see flow sheet as applicable)     Details if applicable:  D2 flexion, scaption, wall slides   15 15 20494 Manual Therapy (timed):  decrease pain, increase ROM, increase tissue extensibility, decrease trigger points, and increase postural awareness to improve patient's ability to progress to PLOF and address remaining functional goals.  The manual therapy interventions were performed at a separate and distinct time from the therapeutic activities interventions . (see flow sheet as applicable)    Details if applicable:  inferior, posterior R GHJ mobs, R pec release, STM/MFR posterior shoulder and biceps tendon, scapular mobs             48 43    Total Total         Modalities Rationale:     decrease edema, decrease inflammation, and decrease pain to improve patient's ability to progress to PLOF and address remaining functional goals.       min [] Estim Unattended,             type/location:       []  w/ice    []  w/heat        min [] Estim Attended,             type/location:       []  w/ice   []  w/heat         []  w/US   []  TENS insruct            min []  Mechanical Traction,        type/lbs:        []  pro      []  sup           []  int       []  cont            []  before manual           []  after manual     min []  Ultrasound,         settings/location:      min  unbilled []  Ice     []  Heat            location/position:    15     min [x]  Vasopneumatic Device,      press/temp:   pre-treatment girth :    post-treatment girth :    measured at (landmark       location) : 34  If using vaso (only need to measure limb vaso being performed on)        min []  Other:      Skin assessment post-treatment (if applicable):    [x]  intact    []  redness- no adverse reaction                 []redness - adverse reaction:          [x]  Patient Education billed concurrently with other procedures   [x] Review HEP    [] Progressed/Changed HEP,

## 2024-06-11 ENCOUNTER — HOSPITAL ENCOUNTER (OUTPATIENT)
Dept: PHYSICAL THERAPY | Facility: HOSPITAL | Age: 54
Setting detail: RECURRING SERIES
Discharge: HOME | End: 2024-06-14
Attending: FAMILY MEDICINE
Payer: COMMERCIAL

## 2024-06-11 PROCEDURE — 97016 VASOPNEUMATIC DEVICE THERAPY: CPT

## 2024-06-11 PROCEDURE — 97112 NEUROMUSCULAR REEDUCATION: CPT

## 2024-06-11 PROCEDURE — 97110 THERAPEUTIC EXERCISES: CPT

## 2024-06-11 PROCEDURE — 97140 MANUAL THERAPY 1/> REGIONS: CPT
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
## 2024-06-13 ENCOUNTER — HOSPITAL ENCOUNTER (OUTPATIENT)
Dept: PHYSICAL THERAPY | Facility: HOSPITAL | Age: 54
Setting detail: RECURRING SERIES
Discharge: HOME | End: 2024-06-16
Attending: FAMILY MEDICINE
Payer: COMMERCIAL

## 2024-06-13 PROCEDURE — 97016 VASOPNEUMATIC DEVICE THERAPY: CPT

## 2024-06-13 PROCEDURE — 97110 THERAPEUTIC EXERCISES: CPT

## 2024-06-13 PROCEDURE — 97112 NEUROMUSCULAR REEDUCATION: CPT

## 2024-06-13 PROCEDURE — 97140 MANUAL THERAPY 1/> REGIONS: CPT

## 2024-06-13 NOTE — PROGRESS NOTES
functional goals. (see flow sheet as applicable)     Details if applicable:  D2 flexion, scaption, wall slides horizontal ABD super set with B ER emphasis on scapular retraction and avoidance of UT compensation   76 00 26037 Manual Therapy (timed):  decrease pain, increase ROM, increase tissue extensibility, decrease trigger points, and increase postural awareness to improve patient's ability to progress to PLOF and address remaining functional goals.  The manual therapy interventions were performed at a separate and distinct time from the therapeutic activities interventions . (see flow sheet as applicable)    Details if applicable:  inferior, posterior R GHJ mobs, R pec release, STM/MFR posterior shoulder and biceps tendon, scapular mobs             62 57    Total Total         Modalities Rationale:     decrease edema, decrease inflammation, and decrease pain to improve patient's ability to progress to PLOF and address remaining functional goals.       min [] Estim Unattended,             type/location:       []  w/ice    []  w/heat        min [] Estim Attended,             type/location:       []  w/ice   []  w/heat         []  w/US   []  TENS insruct            min []  Mechanical Traction,        type/lbs:        []  pro      []  sup           []  int       []  cont            []  before manual           []  after manual     min []  Ultrasound,         settings/location:      min  unbilled []  Ice     []  Heat            location/position:    15     min [x]  Vasopneumatic Device,      press/temp:   pre-treatment girth :    post-treatment girth :    measured at (landmark       location) : 34  If using vaso (only need to measure limb vaso being performed on)        min []  Other:      Skin assessment post-treatment (if applicable):    [x]  intact    []  redness- no adverse reaction                 []redness - adverse reaction:          [x]  Patient Education billed concurrently with other procedures   [x] Review

## 2024-06-17 ENCOUNTER — HOSPITAL ENCOUNTER (OUTPATIENT)
Dept: PHYSICAL THERAPY | Facility: HOSPITAL | Age: 54
Setting detail: RECURRING SERIES
Discharge: HOME | End: 2024-06-20
Attending: FAMILY MEDICINE
Payer: COMMERCIAL

## 2024-06-17 PROCEDURE — 97112 NEUROMUSCULAR REEDUCATION: CPT

## 2024-06-17 PROCEDURE — 97110 THERAPEUTIC EXERCISES: CPT

## 2024-06-17 PROCEDURE — 97016 VASOPNEUMATIC DEVICE THERAPY: CPT

## 2024-06-17 PROCEDURE — 97140 MANUAL THERAPY 1/> REGIONS: CPT
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
## 2024-06-19 ENCOUNTER — HOSPITAL ENCOUNTER (OUTPATIENT)
Dept: PHYSICAL THERAPY | Facility: HOSPITAL | Age: 54
Setting detail: RECURRING SERIES
Discharge: HOME | End: 2024-06-22
Attending: FAMILY MEDICINE
Payer: COMMERCIAL

## 2024-06-19 PROCEDURE — 97110 THERAPEUTIC EXERCISES: CPT

## 2024-06-19 PROCEDURE — 97140 MANUAL THERAPY 1/> REGIONS: CPT

## 2024-06-19 PROCEDURE — 97112 NEUROMUSCULAR REEDUCATION: CPT

## 2024-06-19 PROCEDURE — 97016 VASOPNEUMATIC DEVICE THERAPY: CPT
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
## 2024-06-25 ENCOUNTER — OFFICE VISIT (OUTPATIENT)
Facility: CLINIC | Age: 54
End: 2024-06-25
Payer: COMMERCIAL

## 2024-06-25 ENCOUNTER — HOSPITAL ENCOUNTER (OUTPATIENT)
Dept: PHYSICAL THERAPY | Facility: HOSPITAL | Age: 54
Setting detail: RECURRING SERIES
Discharge: HOME | End: 2024-06-28
Attending: FAMILY MEDICINE
Payer: COMMERCIAL

## 2024-06-25 VITALS
OXYGEN SATURATION: 99 % | DIASTOLIC BLOOD PRESSURE: 65 MMHG | HEIGHT: 66 IN | BODY MASS INDEX: 27.98 KG/M2 | TEMPERATURE: 97.3 F | RESPIRATION RATE: 16 BRPM | WEIGHT: 174.1 LBS | SYSTOLIC BLOOD PRESSURE: 101 MMHG | HEART RATE: 52 BPM

## 2024-06-25 DIAGNOSIS — E78.00 HYPERCHOLESTEREMIA: ICD-10-CM

## 2024-06-25 DIAGNOSIS — M25.811 IMPINGEMENT OF RIGHT SHOULDER: ICD-10-CM

## 2024-06-25 DIAGNOSIS — Z00.00 VISIT FOR WELL MAN HEALTH CHECK: Primary | ICD-10-CM

## 2024-06-25 PROCEDURE — 97140 MANUAL THERAPY 1/> REGIONS: CPT

## 2024-06-25 PROCEDURE — 97112 NEUROMUSCULAR REEDUCATION: CPT

## 2024-06-25 PROCEDURE — 97016 VASOPNEUMATIC DEVICE THERAPY: CPT

## 2024-06-25 PROCEDURE — 99396 PREV VISIT EST AGE 40-64: CPT | Performed by: FAMILY MEDICINE

## 2024-06-25 PROCEDURE — 97110 THERAPEUTIC EXERCISES: CPT

## 2024-06-25 PROCEDURE — 99213 OFFICE O/P EST LOW 20 MIN: CPT | Performed by: FAMILY MEDICINE

## 2024-06-25 SDOH — ECONOMIC STABILITY: INCOME INSECURITY: HOW HARD IS IT FOR YOU TO PAY FOR THE VERY BASICS LIKE FOOD, HOUSING, MEDICAL CARE, AND HEATING?: NOT HARD AT ALL

## 2024-06-25 SDOH — ECONOMIC STABILITY: FOOD INSECURITY: WITHIN THE PAST 12 MONTHS, THE FOOD YOU BOUGHT JUST DIDN'T LAST AND YOU DIDN'T HAVE MONEY TO GET MORE.: NEVER TRUE

## 2024-06-25 SDOH — ECONOMIC STABILITY: FOOD INSECURITY: WITHIN THE PAST 12 MONTHS, YOU WORRIED THAT YOUR FOOD WOULD RUN OUT BEFORE YOU GOT MONEY TO BUY MORE.: NEVER TRUE

## 2024-06-25 ASSESSMENT — ANXIETY QUESTIONNAIRES
6. BECOMING EASILY ANNOYED OR IRRITABLE: NOT AT ALL
4. TROUBLE RELAXING: NOT AT ALL
IF YOU CHECKED OFF ANY PROBLEMS ON THIS QUESTIONNAIRE, HOW DIFFICULT HAVE THESE PROBLEMS MADE IT FOR YOU TO DO YOUR WORK, TAKE CARE OF THINGS AT HOME, OR GET ALONG WITH OTHER PEOPLE: NOT DIFFICULT AT ALL
7. FEELING AFRAID AS IF SOMETHING AWFUL MIGHT HAPPEN: NOT AT ALL
GAD7 TOTAL SCORE: 0
3. WORRYING TOO MUCH ABOUT DIFFERENT THINGS: NOT AT ALL
5. BEING SO RESTLESS THAT IT IS HARD TO SIT STILL: NOT AT ALL
1. FEELING NERVOUS, ANXIOUS, OR ON EDGE: NOT AT ALL
2. NOT BEING ABLE TO STOP OR CONTROL WORRYING: NOT AT ALL

## 2024-06-25 ASSESSMENT — PATIENT HEALTH QUESTIONNAIRE - PHQ9
SUM OF ALL RESPONSES TO PHQ QUESTIONS 1-9: 0
SUM OF ALL RESPONSES TO PHQ QUESTIONS 1-9: 0
2. FEELING DOWN, DEPRESSED OR HOPELESS: NOT AT ALL
8. MOVING OR SPEAKING SO SLOWLY THAT OTHER PEOPLE COULD HAVE NOTICED. OR THE OPPOSITE, BEING SO FIGETY OR RESTLESS THAT YOU HAVE BEEN MOVING AROUND A LOT MORE THAN USUAL: NOT AT ALL
10. IF YOU CHECKED OFF ANY PROBLEMS, HOW DIFFICULT HAVE THESE PROBLEMS MADE IT FOR YOU TO DO YOUR WORK, TAKE CARE OF THINGS AT HOME, OR GET ALONG WITH OTHER PEOPLE: NOT DIFFICULT AT ALL
7. TROUBLE CONCENTRATING ON THINGS, SUCH AS READING THE NEWSPAPER OR WATCHING TELEVISION: NOT AT ALL
5. POOR APPETITE OR OVEREATING: NOT AT ALL
SUM OF ALL RESPONSES TO PHQ QUESTIONS 1-9: 0
SUM OF ALL RESPONSES TO PHQ9 QUESTIONS 1 & 2: 0
9. THOUGHTS THAT YOU WOULD BE BETTER OFF DEAD, OR OF HURTING YOURSELF: NOT AT ALL
4. FEELING TIRED OR HAVING LITTLE ENERGY: NOT AT ALL
3. TROUBLE FALLING OR STAYING ASLEEP: NOT AT ALL
1. LITTLE INTEREST OR PLEASURE IN DOING THINGS: NOT AT ALL
SUM OF ALL RESPONSES TO PHQ QUESTIONS 1-9: 0
6. FEELING BAD ABOUT YOURSELF - OR THAT YOU ARE A FAILURE OR HAVE LET YOURSELF OR YOUR FAMILY DOWN: NOT AT ALL

## 2024-06-25 NOTE — PROGRESS NOTES
Patient Education billed concurrently with other procedures   [x] Review HEP    [] Progressed/Changed HEP, detail:    [] Other detail:         Other Objective/Functional Measures  --    Pain Level at end of session (0-10 scale): 1-2/10      Assessment   Pt is continuing to show gradual improvement in postural awareness dueing strength exercises. He requires tactile manual cueing for PNF D2 flexion, horizontal abduction and prone T's to perform correctly. He will see Francois for dry needling next week to address firing and pain in distal RTC and biceps as well as UT.   Patient will continue to benefit from skilled PT / OT services to modify and progress therapeutic interventions, analyze and address functional mobility deficits, analyze and address ROM deficits, analyze and address strength deficits, analyze and address soft tissue restrictions, analyze and cue for proper movement patterns, analyze and modify for postural abnormalities, and instruct in home and community integration to address functional deficits and attain remaining goals.    Progress toward goals / Updated goals:  []  See Progress Note/Recertification    Short Term Goals: To be accomplished in 10 treatments.  The patient will be independent with introductory HEP with no v.c. or tactile cuing by PT needed  Partially MET  The patient will improve  the ability to raise Right UE overhead with good shoulder mechanics without pain MET  The patient will demonstrate pain free right clavicle. 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 Progressing    Long Term Goals: To be accomplished in 20 treatments.  The patient will demonstrate pain free shoulder AROM multidirectionally to improve ease of carrying ladders overhead, lowering hoses that weight 45# in order to perform work duties.   The patient will report ability to don on his fire gear without pain in his neck and shoulder.   The patient will

## 2024-06-25 NOTE — PROGRESS NOTES
Chief Complaint   Patient presents with    Annual Exam     he is a 53 y.o. year old male who presents for CPE.  Complete Physical Exam Questions:    1.  Do you follow a low fat diet?  no  2.  Are you up to date on your Tdap (<10 years)?  Yes  3.  Have you ever had a Pneumovax vaccine (>65)?  No   PCV13 No   PPSV23 No  4.  Have you had Zoster vaccine (>60)? No  5.  Have you had the HPV - Gardasil (13- 26)? No  6.  Do you follow an exercise program?  No  7.  Do you smoke?  No If > 65 and smoker, have you had a abdominal aortic aneurysm ultrasound screen?  No  8.  Do you consider yourself overweight?  Yes  9.  Is there a family history of CAD< age 50?  No  10.  Is there a family history of Cancer?  Yes  11.  Do you know your Cancer risks? No  12.  Have you had a colonoscopy?  Yes  13. Have you been tested for HIV or other STI's? Yes HIV today(18-66 y/o)?No   14.  Have you had an EKG in the last five years(>50)?Yes  15.  Have you had a PSA test done this year (50-69)? Yes    Other complaints:  right shoulder pain.    Reviewed and agree with Nurse Note and duplicated in this note.  Reviewed PmHx, RxHx, FmHx, SocHx, AllgHx and updated and dated in the chart.    Family History   Problem Relation Age of Onset    Osteoarthritis Mother     High Cholesterol Mother             Hypertension Mother     Cancer Father                Past Medical History:   Diagnosis Date    Anxiety state, unspecified     Basal cell carcinoma     Calculus of kidney 2014    Left 6mm.  Left 1 mm.  Dr. Damian Chauhan.    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  Dr. Russ Penn.    Other and unspecified hyperlipidemia     Recinos splints 08    Dr. Harris.  Gastoc Equines    Shoulder pain 01    Right.  Tendinitis.  Dr. Memo Aguilera    Tinnitus     Right. Dr. Josselin Krishnamurthy      Social History     Socioeconomic History    Marital status: Single     Spouse name: None    Number of children: 0    Years

## 2024-06-26 ENCOUNTER — HOSPITAL ENCOUNTER (OUTPATIENT)
Dept: PHYSICAL THERAPY | Facility: HOSPITAL | Age: 54
Setting detail: RECURRING SERIES
Discharge: HOME | End: 2024-06-29
Attending: FAMILY MEDICINE
Payer: COMMERCIAL

## 2024-06-26 LAB
ALBUMIN SERPL-MCNC: 4.5 G/DL (ref 3.8–4.9)
ALP SERPL-CCNC: 100 IU/L (ref 44–121)
ALT SERPL-CCNC: 16 IU/L (ref 0–44)
AST SERPL-CCNC: 20 IU/L (ref 0–40)
BASOPHILS # BLD AUTO: 0 X10E3/UL (ref 0–0.2)
BASOPHILS NFR BLD AUTO: 1 %
BILIRUB SERPL-MCNC: 0.8 MG/DL (ref 0–1.2)
BUN SERPL-MCNC: 19 MG/DL (ref 6–24)
BUN/CREAT SERPL: 18 (ref 9–20)
CALCIUM SERPL-MCNC: 9.5 MG/DL (ref 8.7–10.2)
CHLORIDE SERPL-SCNC: 102 MMOL/L (ref 96–106)
CHOLEST SERPL-MCNC: 143 MG/DL (ref 100–199)
CO2 SERPL-SCNC: 25 MMOL/L (ref 20–29)
CREAT SERPL-MCNC: 1.07 MG/DL (ref 0.76–1.27)
EGFRCR SERPLBLD CKD-EPI 2021: 83 ML/MIN/1.73
EOSINOPHIL # BLD AUTO: 0.2 X10E3/UL (ref 0–0.4)
EOSINOPHIL NFR BLD AUTO: 4 %
ERYTHROCYTE [DISTWIDTH] IN BLOOD BY AUTOMATED COUNT: 13.5 % (ref 11.6–15.4)
GLOBULIN SER CALC-MCNC: 2.2 G/DL (ref 1.5–4.5)
GLUCOSE SERPL-MCNC: 99 MG/DL (ref 70–99)
HCT VFR BLD AUTO: 43.9 % (ref 37.5–51)
HDLC SERPL-MCNC: 33 MG/DL
HGB BLD-MCNC: 14.6 G/DL (ref 13–17.7)
IMM GRANULOCYTES # BLD AUTO: 0 X10E3/UL (ref 0–0.1)
IMM GRANULOCYTES NFR BLD AUTO: 0 %
LDLC SERPL CALC-MCNC: 92 MG/DL (ref 0–99)
LYMPHOCYTES # BLD AUTO: 2.1 X10E3/UL (ref 0.7–3.1)
LYMPHOCYTES NFR BLD AUTO: 34 %
MCH RBC QN AUTO: 29.1 PG (ref 26.6–33)
MCHC RBC AUTO-ENTMCNC: 33.3 G/DL (ref 31.5–35.7)
MCV RBC AUTO: 88 FL (ref 79–97)
MONOCYTES # BLD AUTO: 0.3 X10E3/UL (ref 0.1–0.9)
MONOCYTES NFR BLD AUTO: 5 %
NEUTROPHILS # BLD AUTO: 3.6 X10E3/UL (ref 1.4–7)
NEUTROPHILS NFR BLD AUTO: 56 %
PLATELET # BLD AUTO: 236 X10E3/UL (ref 150–450)
POTASSIUM SERPL-SCNC: 4.5 MMOL/L (ref 3.5–5.2)
PROT SERPL-MCNC: 6.7 G/DL (ref 6–8.5)
PSA SERPL-MCNC: 1.1 NG/ML (ref 0–4)
RBC # BLD AUTO: 5.01 X10E6/UL (ref 4.14–5.8)
SODIUM SERPL-SCNC: 140 MMOL/L (ref 134–144)
TRIGL SERPL-MCNC: 96 MG/DL (ref 0–149)
VLDLC SERPL CALC-MCNC: 18 MG/DL (ref 5–40)
WBC # BLD AUTO: 6.3 X10E3/UL (ref 3.4–10.8)

## 2024-06-26 PROCEDURE — 97112 NEUROMUSCULAR REEDUCATION: CPT

## 2024-06-26 PROCEDURE — 97110 THERAPEUTIC EXERCISES: CPT

## 2024-06-26 PROCEDURE — 97140 MANUAL THERAPY 1/> REGIONS: CPT

## 2024-06-26 PROCEDURE — 97016 VASOPNEUMATIC DEVICE THERAPY: CPT

## 2024-06-26 NOTE — PROGRESS NOTES
sheet as applicable)     Details if applicable:  tactile and verbal cueing for exercises focusing on form and decreased UT compensation.    15 80 31053 Manual Therapy (timed):  decrease pain, increase ROM, increase tissue extensibility, decrease trigger points, and increase postural awareness to improve patient's ability to progress to PLOF and address remaining functional goals.  The manual therapy interventions were performed at a separate and distinct time from the therapeutic activities interventions . (see flow sheet as applicable)    Details if applicable:  inferior, posterior R GHJ mobs, R pec and subscap release, STM/MFR posterior shoulder and biceps tendon and deltoid with IASTM             44 39    Total Total         Modalities Rationale:     decrease edema, decrease inflammation, and decrease pain to improve patient's ability to progress to PLOF and address remaining functional goals.       min [] Estim Unattended,             type/location:       []  w/ice    []  w/heat        min [] Estim Attended,             type/location:       []  w/ice   []  w/heat         []  w/US   []  TENS insruct            min []  Mechanical Traction,        type/lbs:        []  pro      []  sup           []  int       []  cont            []  before manual           []  after manual     min []  Ultrasound,         settings/location:      min  unbilled []  Ice     []  Heat            location/position:    10     min [x]  Vasopneumatic Device,      press/temp:   pre-treatment girth :    post-treatment girth :    measured at (landmark       location) : 34  If using vaso (only need to measure limb vaso being performed on)        min []  Other:      Skin assessment post-treatment (if applicable):    [x]  intact    []  redness- no adverse reaction                 []redness - adverse reaction:          [x]  Patient Education billed concurrently with other procedures   [x] Review HEP    [] Progressed/Changed HEP, detail:    [] Other

## 2024-06-28 LAB
TESTOST FREE SERPL-MCNC: 6 PG/ML (ref 7.2–24)
TESTOST SERPL-MCNC: 471 NG/DL (ref 264–916)

## 2024-07-02 ENCOUNTER — HOSPITAL ENCOUNTER (OUTPATIENT)
Dept: PHYSICAL THERAPY | Facility: HOSPITAL | Age: 54
Setting detail: RECURRING SERIES
Discharge: HOME | End: 2024-07-05
Attending: FAMILY MEDICINE
Payer: COMMERCIAL

## 2024-07-02 PROCEDURE — 20560 NDL INSJ W/O NJX 1 OR 2 MUSC: CPT | Performed by: PHYSICAL MEDICINE & REHABILITATION

## 2024-07-02 PROCEDURE — 97110 THERAPEUTIC EXERCISES: CPT | Performed by: PHYSICAL MEDICINE & REHABILITATION

## 2024-07-02 PROCEDURE — 97032 APPL MODALITY 1+ESTIM EA 15: CPT | Performed by: PHYSICAL MEDICINE & REHABILITATION
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
## 2024-07-10 ENCOUNTER — HOSPITAL ENCOUNTER (OUTPATIENT)
Dept: PHYSICAL THERAPY | Facility: HOSPITAL | Age: 54
Setting detail: RECURRING SERIES
Discharge: HOME | End: 2024-07-13
Attending: FAMILY MEDICINE
Payer: COMMERCIAL

## 2024-07-10 PROCEDURE — 97016 VASOPNEUMATIC DEVICE THERAPY: CPT

## 2024-07-10 PROCEDURE — 97140 MANUAL THERAPY 1/> REGIONS: CPT

## 2024-07-10 PROCEDURE — 97112 NEUROMUSCULAR REEDUCATION: CPT

## 2024-07-10 PROCEDURE — 97110 THERAPEUTIC EXERCISES: CPT

## 2024-07-10 NOTE — PROGRESS NOTES
core stability and proprioception to improve patient's ability to develop conscious control of individual muscles and awareness of position of extremities in order to progress to PLOF and address remaining functional goals. (see flow sheet as applicable)     Details if applicable:  tactile and verbal cueing for exercises focusing on form and decreased UT compensation.    15 18 69525 Manual Therapy (timed):  decrease pain, increase ROM, increase tissue extensibility, decrease trigger points, and increase postural awareness to improve patient's ability to progress to PLOF and address remaining functional goals.  The manual therapy interventions were performed at a separate and distinct time from the therapeutic activities interventions . (see flow sheet as applicable)    Details if applicable:  inferior, posterior R GHJ mobs, R pec and subscap release, STM/MFR posterior shoulder and biceps tendon and deltoid with IASTM             45 40    Total Total         Modalities Rationale:     decrease edema, decrease inflammation, and decrease pain to improve patient's ability to progress to PLOF and address remaining functional goals.       min [] Estim Unattended,             type/location:       []  w/ice    []  w/heat        min [] Estim Attended,             type/location:       []  w/ice   []  w/heat         []  w/US   []  TENS insruct            min []  Mechanical Traction,        type/lbs:        []  pro      []  sup           []  int       []  cont            []  before manual           []  after manual     min []  Ultrasound,         settings/location:      min  unbilled []  Ice     []  Heat            location/position:    15     min [x]  Vasopneumatic Device,      press/temp:   pre-treatment girth :    post-treatment girth :    measured at (landmark       location) : 34  If using vaso (only need to measure limb vaso being performed on)        min []  Other:      Skin assessment post-treatment (if applicable):

## 2024-07-12 ENCOUNTER — HOSPITAL ENCOUNTER (OUTPATIENT)
Dept: PHYSICAL THERAPY | Facility: HOSPITAL | Age: 54
Setting detail: RECURRING SERIES
Discharge: HOME | End: 2024-07-15
Attending: FAMILY MEDICINE
Payer: COMMERCIAL

## 2024-07-12 ENCOUNTER — APPOINTMENT (OUTPATIENT)
Dept: PHYSICAL THERAPY | Facility: HOSPITAL | Age: 54
End: 2024-07-12
Attending: FAMILY MEDICINE
Payer: COMMERCIAL

## 2024-07-12 PROCEDURE — 97032 APPL MODALITY 1+ESTIM EA 15: CPT | Performed by: PHYSICAL MEDICINE & REHABILITATION

## 2024-07-12 PROCEDURE — 97016 VASOPNEUMATIC DEVICE THERAPY: CPT | Performed by: PHYSICAL MEDICINE & REHABILITATION

## 2024-07-12 PROCEDURE — 20560 NDL INSJ W/O NJX 1 OR 2 MUSC: CPT | Performed by: PHYSICAL MEDICINE & REHABILITATION

## 2024-07-12 PROCEDURE — 97110 THERAPEUTIC EXERCISES: CPT | Performed by: PHYSICAL MEDICINE & REHABILITATION
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
## 2024-07-16 ENCOUNTER — HOSPITAL ENCOUNTER (OUTPATIENT)
Dept: PHYSICAL THERAPY | Facility: HOSPITAL | Age: 54
Setting detail: RECURRING SERIES
Discharge: HOME | End: 2024-07-19
Attending: FAMILY MEDICINE
Payer: COMMERCIAL

## 2024-07-16 PROCEDURE — 97110 THERAPEUTIC EXERCISES: CPT

## 2024-07-16 PROCEDURE — 97140 MANUAL THERAPY 1/> REGIONS: CPT

## 2024-07-16 PROCEDURE — 97016 VASOPNEUMATIC DEVICE THERAPY: CPT

## 2024-07-16 PROCEDURE — 97112 NEUROMUSCULAR REEDUCATION: CPT
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
## 2024-07-18 ENCOUNTER — HOSPITAL ENCOUNTER (OUTPATIENT)
Dept: PHYSICAL THERAPY | Facility: HOSPITAL | Age: 54
Setting detail: RECURRING SERIES
Discharge: HOME | End: 2024-07-21
Attending: FAMILY MEDICINE
Payer: COMMERCIAL

## 2024-07-18 PROCEDURE — 97016 VASOPNEUMATIC DEVICE THERAPY: CPT

## 2024-07-18 PROCEDURE — 97140 MANUAL THERAPY 1/> REGIONS: CPT

## 2024-07-18 PROCEDURE — 97112 NEUROMUSCULAR REEDUCATION: CPT

## 2024-07-18 PROCEDURE — 97110 THERAPEUTIC EXERCISES: CPT

## 2024-07-18 NOTE — PROGRESS NOTES
week for 20 treatments.      PLAN  Yes  Continue plan of care  Re-Cert Due: 20 visits  [x]  Upgrade activities as tolerated  []  Discharge due to:  []  Other:      Yohana Gregory, PT       7/18/2024       11:12 AM

## 2024-07-22 ENCOUNTER — HOSPITAL ENCOUNTER (OUTPATIENT)
Dept: PHYSICAL THERAPY | Facility: HOSPITAL | Age: 54
Setting detail: RECURRING SERIES
Discharge: HOME | End: 2024-07-25
Attending: FAMILY MEDICINE
Payer: COMMERCIAL

## 2024-07-22 PROCEDURE — 97112 NEUROMUSCULAR REEDUCATION: CPT

## 2024-07-22 PROCEDURE — 97110 THERAPEUTIC EXERCISES: CPT

## 2024-07-22 PROCEDURE — 97140 MANUAL THERAPY 1/> REGIONS: CPT

## 2024-07-22 PROCEDURE — 97016 VASOPNEUMATIC DEVICE THERAPY: CPT

## 2024-07-22 NOTE — PROGRESS NOTES
extremities in order to progress to PLOF and address remaining functional goals. (see flow sheet as applicable)     Details if applicable:  tactile and verbal cueing for exercises focusing on form and decreased [place] compensation.    15 27 42470 Manual Therapy (timed):  decrease pain, increase ROM, increase tissue extensibility, decrease trigger points, and increase postural awareness to improve patient's ability to progress to PLOF and address remaining functional goals.  The manual therapy interventions were performed at a separate and distinct time from the therapeutic activities interventions . (see flow sheet as applicable)    Details if applicable:  inferior, posterior R GHJ mobs, R pec and subscap release, STM/MFR posterior shoulder and biceps tendon and deltoid with IASTM. MET for right elevated 1st rib.              60 55    Total Total         Modalities Rationale:     decrease edema, decrease inflammation, and decrease pain to improve patient's ability to progress to PLOF and address remaining functional goals.       min [] Estim Unattended,             type/location:       []  w/ice    []  w/heat        min [] Estim Attended,             type/location:       []  w/ice   []  w/heat         []  w/US   []  TENS insruct            min []  Mechanical Traction,        type/lbs:        []  pro      []  sup           []  int       []  cont            []  before manual           []  after manual     min []  Ultrasound,         settings/location:      min  unbilled []  Ice     []  Heat            location/position:    10     min [x]  Vasopneumatic Device,      press/temp:   pre-treatment girth :    post-treatment girth :    measured at (landmark       location) : 34  If using vaso (only need to measure limb vaso being performed on)        min []  Other:      Skin assessment post-treatment (if applicable):    [x]  intact    []  redness- no adverse reaction                 []redness - adverse reaction:          [x]

## 2024-07-25 ENCOUNTER — HOSPITAL ENCOUNTER (OUTPATIENT)
Dept: PHYSICAL THERAPY | Facility: HOSPITAL | Age: 54
Setting detail: RECURRING SERIES
Discharge: HOME | End: 2024-07-28
Attending: FAMILY MEDICINE
Payer: COMMERCIAL

## 2024-07-25 PROCEDURE — 97016 VASOPNEUMATIC DEVICE THERAPY: CPT

## 2024-07-25 PROCEDURE — 97140 MANUAL THERAPY 1/> REGIONS: CPT

## 2024-07-25 PROCEDURE — 97110 THERAPEUTIC EXERCISES: CPT

## 2024-07-25 PROCEDURE — 97112 NEUROMUSCULAR REEDUCATION: CPT
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
## 2024-07-31 ENCOUNTER — HOSPITAL ENCOUNTER (OUTPATIENT)
Dept: PHYSICAL THERAPY | Facility: HOSPITAL | Age: 54
Setting detail: RECURRING SERIES
Discharge: HOME | End: 2024-08-03
Attending: FAMILY MEDICINE
Payer: COMMERCIAL

## 2024-07-31 PROCEDURE — 97016 VASOPNEUMATIC DEVICE THERAPY: CPT

## 2024-07-31 PROCEDURE — 97140 MANUAL THERAPY 1/> REGIONS: CPT

## 2024-07-31 PROCEDURE — 97112 NEUROMUSCULAR REEDUCATION: CPT

## 2024-07-31 PROCEDURE — 97110 THERAPEUTIC EXERCISES: CPT

## 2024-07-31 NOTE — PROGRESS NOTES
address remaining functional goals. (see flow sheet as applicable)     Details if applicable:  tactile and verbal cueing for exercises focusing on form and decreased [place] compensation.    15 12 46019 Manual Therapy (timed):  decrease pain, increase ROM, increase tissue extensibility, decrease trigger points, and increase postural awareness to improve patient's ability to progress to PLOF and address remaining functional goals.  The manual therapy interventions were performed at a separate and distinct time from the therapeutic activities interventions . (see flow sheet as applicable)    Details if applicable:  inferior, posterior R GHJ mobs, R pec and subscap release, STM/MFR posterior shoulder and biceps tendon and deltoid with IASTM. MET for right elevated 1st rib.              60 55    Total Total         Modalities Rationale:     decrease edema, decrease inflammation, and decrease pain to improve patient's ability to progress to PLOF and address remaining functional goals.       min [] Estim Unattended,             type/location:       []  w/ice    []  w/heat        min [] Estim Attended,             type/location:       []  w/ice   []  w/heat         []  w/US   []  TENS insruct            min []  Mechanical Traction,        type/lbs:        []  pro      []  sup           []  int       []  cont            []  before manual           []  after manual     min []  Ultrasound,         settings/location:      min  unbilled []  Ice     []  Heat            location/position:    10     min [x]  Vasopneumatic Device,      press/temp:   pre-treatment girth :    post-treatment girth :    measured at (landmark       location) : 34  If using vaso (only need to measure limb vaso being performed on)        min []  Other:      Skin assessment post-treatment (if applicable):    [x]  intact    []  redness- no adverse reaction                 []redness - adverse reaction:          [x]  Patient Education billed concurrently with

## 2024-08-06 ENCOUNTER — HOSPITAL ENCOUNTER (OUTPATIENT)
Dept: PHYSICAL THERAPY | Facility: HOSPITAL | Age: 54
Setting detail: RECURRING SERIES
Discharge: HOME | End: 2024-08-09
Attending: FAMILY MEDICINE
Payer: COMMERCIAL

## 2024-08-06 PROCEDURE — 97112 NEUROMUSCULAR REEDUCATION: CPT

## 2024-08-06 PROCEDURE — 97140 MANUAL THERAPY 1/> REGIONS: CPT

## 2024-08-06 PROCEDURE — 97110 THERAPEUTIC EXERCISES: CPT

## 2024-08-06 PROCEDURE — 97016 VASOPNEUMATIC DEVICE THERAPY: CPT

## 2024-08-06 NOTE — PROGRESS NOTES
and address remaining functional goals. (see flow sheet as applicable)     Details if applicable:  tactile and verbal cueing for exercises focusing on form and decreased [place] compensation.    15 43 [id number] Manual Therapy (timed):  decrease pain, increase ROM, increase tissue extensibility, decrease trigger points, and increase postural awareness to improve patient's ability to progress to PLOF and address remaining functional goals.  The manual therapy interventions were performed at a separate and distinct time from the therapeutic activities interventions . (see flow sheet as applicable)    Details if applicable:  inferior, posterior R GHJ mobs, R pec and subscap release, STM/MFR posterior shoulder and biceps tendon and deltoid with IASTM.              60 55    Total Total         Modalities Rationale:     decrease edema, decrease inflammation, and decrease pain to improve patient's ability to progress to PLOF and address remaining functional goals.       min [] Estim Unattended,             type/location:       []  w/ice    []  w/heat        min [] Estim Attended,             type/location:       []  w/ice   []  w/heat         []  w/US   []  TENS insruct            min []  Mechanical Traction,        type/lbs:        []  pro      []  sup           []  int       []  cont            []  before manual           []  after manual     min []  Ultrasound,         settings/location:      min  unbilled []  Ice     []  Heat            location/position:    15     min [x]  Vasopneumatic Device,      press/temp:   pre-treatment girth :    post-treatment girth :    measured at (landmark       location) : 34  If using vaso (only need to measure limb vaso being performed on)        min []  Other:      Skin assessment post-treatment (if applicable):    [x]  intact    []  redness- no adverse reaction                 []redness - adverse reaction:          [x]  Patient Education billed concurrently with other procedures   [x] Review

## 2024-08-12 ENCOUNTER — HOSPITAL ENCOUNTER (OUTPATIENT)
Dept: PHYSICAL THERAPY | Facility: HOSPITAL | Age: 54
Setting detail: RECURRING SERIES
Discharge: HOME | End: 2024-08-15
Attending: FAMILY MEDICINE
Payer: COMMERCIAL

## 2024-08-12 PROCEDURE — 97112 NEUROMUSCULAR REEDUCATION: CPT

## 2024-08-12 PROCEDURE — 97140 MANUAL THERAPY 1/> REGIONS: CPT

## 2024-08-12 PROCEDURE — 97016 VASOPNEUMATIC DEVICE THERAPY: CPT

## 2024-08-12 PROCEDURE — 97110 THERAPEUTIC EXERCISES: CPT

## 2024-08-12 NOTE — PROGRESS NOTES
times per week for 20 treatments.      PLAN  Yes  Continue plan of care  Re-Cert Due: 20 visits  [x]  Upgrade activities as tolerated  []  Discharge due to:  []  Other:      Yohana Gregory, PT       8/12/2024       2:24 PM

## 2024-08-15 ENCOUNTER — HOSPITAL ENCOUNTER (OUTPATIENT)
Dept: PHYSICAL THERAPY | Facility: HOSPITAL | Age: 54
Setting detail: RECURRING SERIES
Discharge: HOME | End: 2024-08-18
Attending: FAMILY MEDICINE
Payer: COMMERCIAL

## 2024-08-15 PROCEDURE — 97110 THERAPEUTIC EXERCISES: CPT

## 2024-08-15 PROCEDURE — 97140 MANUAL THERAPY 1/> REGIONS: CPT

## 2024-08-15 NOTE — PROGRESS NOTES
address remaining functional goals. (see flow sheet as applicable)     Details if applicable:  tactile and verbal cueing for exercises focusing on form and decreased [place] compensation.    15 92 [id number] Manual Therapy (timed):  decrease pain, increase ROM, increase tissue extensibility, decrease trigger points, and increase postural awareness to improve patient's ability to progress to PLOF and address remaining functional goals.  The manual therapy interventions were performed at a separate and distinct time from the therapeutic activities interventions . (see flow sheet as applicable)    Details if applicable:  inferior, posterior R GHJ mobs, R pec and subscap release, STM/MFR posterior shoulder and biceps tendon and deltoid with IASTM.              65 60    Total Total         Modalities Rationale:     decrease edema, decrease inflammation, and decrease pain to improve patient's ability to progress to PLOF and address remaining functional goals.       min [] Estim Unattended,             type/location:       []  w/ice    []  w/heat        min [] Estim Attended,             type/location:       []  w/ice   []  w/heat         []  w/US   []  TENS insruct            min []  Mechanical Traction,        type/lbs:        []  pro      []  sup           []  int       []  cont            []  before manual           []  after manual     min []  Ultrasound,         settings/location:      min  unbilled []  Ice     []  Heat            location/position:    15     min [x]  Vasopneumatic Device,      press/temp:   pre-treatment girth :    post-treatment girth :    measured at (landmark       location) : 34  If using vaso (only need to measure limb vaso being performed on)        min []  Other:      Skin assessment post-treatment (if applicable):    [x]  intact    []  redness- no adverse reaction                 []redness - adverse reaction:          [x]  Patient Education billed concurrently with other procedures   [x] Review HEP
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
## 2024-08-16 ENCOUNTER — OFFICE VISIT (OUTPATIENT)
Age: 54
End: 2024-08-16
Payer: COMMERCIAL

## 2024-08-16 VITALS
HEIGHT: 67 IN | DIASTOLIC BLOOD PRESSURE: 64 MMHG | BODY MASS INDEX: 27.72 KG/M2 | HEART RATE: 54 BPM | RESPIRATION RATE: 18 BRPM | SYSTOLIC BLOOD PRESSURE: 110 MMHG | OXYGEN SATURATION: 98 % | WEIGHT: 176.6 LBS

## 2024-08-16 DIAGNOSIS — R25.2 CRAMP AND SPASM: ICD-10-CM

## 2024-08-16 DIAGNOSIS — M54.2 CERVICALGIA: Primary | ICD-10-CM

## 2024-08-16 DIAGNOSIS — G89.29 CHRONIC RIGHT SHOULDER PAIN: ICD-10-CM

## 2024-08-16 DIAGNOSIS — M25.511 CHRONIC RIGHT SHOULDER PAIN: ICD-10-CM

## 2024-08-16 DIAGNOSIS — M54.12 RADICULOPATHY, CERVICAL REGION: ICD-10-CM

## 2024-08-16 PROCEDURE — 99214 OFFICE O/P EST MOD 30 MIN: CPT | Performed by: PSYCHIATRY & NEUROLOGY

## 2024-08-16 RX ORDER — ACETAMINOPHEN 160 MG
1 TABLET,DISINTEGRATING ORAL DAILY
COMMUNITY

## 2024-08-16 ASSESSMENT — PATIENT HEALTH QUESTIONNAIRE - PHQ9
1. LITTLE INTEREST OR PLEASURE IN DOING THINGS: NOT AT ALL
SUM OF ALL RESPONSES TO PHQ QUESTIONS 1-9: 0
SUM OF ALL RESPONSES TO PHQ9 QUESTIONS 1 & 2: 0
2. FEELING DOWN, DEPRESSED OR HOPELESS: NOT AT ALL

## 2024-08-16 NOTE — PROGRESS NOTES
Chief Complaint   Patient presents with    Neck Pain     Follow up -\" hit and miss\" - hurting today   Also having right shoulder issues      1. Have you been to the ER, urgent care clinic since your last visit?  Hospitalized since your last visit? No     2. Have you seen or consulted any other health care providers outside of the [facility] since your last visit?  Include any pap smears or colon screening.  Yes [facility]

## 2024-09-04 ENCOUNTER — OFFICE VISIT (OUTPATIENT)
Facility: CLINIC | Age: 54
End: 2024-09-04

## 2024-09-04 VITALS
HEART RATE: 66 BPM | SYSTOLIC BLOOD PRESSURE: 123 MMHG | RESPIRATION RATE: 16 BRPM | BODY MASS INDEX: 28.28 KG/M2 | TEMPERATURE: 98 F | WEIGHT: 176 LBS | OXYGEN SATURATION: 97 % | DIASTOLIC BLOOD PRESSURE: 80 MMHG | HEIGHT: 66 IN

## 2024-09-04 DIAGNOSIS — M54.2 NECK PAIN ON RIGHT SIDE: ICD-10-CM

## 2024-09-04 DIAGNOSIS — M25.811 IMPINGEMENT OF RIGHT SHOULDER: Primary | ICD-10-CM

## 2024-09-04 RX ORDER — TRIAMCINOLONE ACETONIDE 40 MG/ML
40 INJECTION, SUSPENSION INTRA-ARTICULAR; INTRAMUSCULAR ONCE
Status: COMPLETED | OUTPATIENT
Start: 2024-09-04 | End: 2024-09-04

## 2024-09-04 RX ADMIN — TRIAMCINOLONE ACETONIDE 40 MG: 40 INJECTION, SUSPENSION INTRA-ARTICULAR; INTRAMUSCULAR at 16:00

## 2024-09-04 NOTE — PROGRESS NOTES
tank   Substance and Sexual Activity    Alcohol use: Yes     Comment: occ    Drug use: Yes     Types: Marijuana (Weed)     Comment: gummies to sleep    Sexual activity: Yes     Partners: Female     Birth control/protection: None     Social Determinants of Health     Financial Resource Strain: Low Risk  (6/25/2024)    Overall Financial Resource Strain (CARDIA)     Difficulty of Paying Living Expenses: Not hard at all   Food Insecurity: No Food Insecurity (6/25/2024)    Hunger Vital Sign     Worried About Running Out of Food in the Last Year: Never true     Ran Out of Food in the Last Year: Never true   Transportation Needs: No Transportation Needs (6/25/2024)    PRAPARE - Transportation     Lack of Transportation (Medical): No     Lack of Transportation (Non-Medical): No   Intimate Partner Violence: Not At Risk (1/23/2024)    Humiliation, Afraid, Rape, and Kick questionnaire     Fear of Current or Ex-Partner: No     Emotionally Abused: No     Physically Abused: No     Sexually Abused: No   Housing Stability: Low Risk  (6/25/2024)    Housing Stability Vital Sign     Unable to Pay for Housing in the Last Year: No     Number of Places Lived in the Last Year: 1     Unstable Housing in the Last Year: No        Review of Systems - negative except as listed above      Objective:     Vitals:    09/04/24 1520   BP: 123/80   Pulse: 66   Resp: 16   Temp: 98 °F (36.7 °C)   SpO2: 97%   Weight: 79.8 kg (176 lb)   Height: 1.676 m (5' 6\")       Physical Examination: General Appearance: alert and oriented to person, place and time, well-developed and well-nourished, in no acute distress  Extremities: no cyanosis and no clubbing  Musculoskeletal: The right shoulder is  normal to inspection.    The patient has diminished range of motion  .    The shoulderis  tender to palpation ac and clavicle.  Bicep tendon: tender  The NEER test is positive.  The Cristina test:is positive   The Cross over test:is  negative.  The Empty Can test:is

## 2024-09-05 ENCOUNTER — APPOINTMENT (OUTPATIENT)
Dept: PHYSICAL THERAPY | Facility: HOSPITAL | Age: 54
End: 2024-09-05
Attending: FAMILY MEDICINE
Payer: COMMERCIAL

## 2024-09-19 ENCOUNTER — HOSPITAL ENCOUNTER (OUTPATIENT)
Dept: PHYSICAL THERAPY | Facility: HOSPITAL | Age: 54
Setting detail: RECURRING SERIES
Discharge: HOME | End: 2024-09-22
Attending: FAMILY MEDICINE
Payer: COMMERCIAL

## 2024-09-19 PROCEDURE — 97140 MANUAL THERAPY 1/> REGIONS: CPT

## 2024-09-19 PROCEDURE — 97110 THERAPEUTIC EXERCISES: CPT

## 2024-10-15 DIAGNOSIS — E78.00 HYPERCHOLESTEROLEMIA: Primary | ICD-10-CM

## 2024-10-15 RX ORDER — ROSUVASTATIN CALCIUM 5 MG/1
5 TABLET, COATED ORAL DAILY
Qty: 90 TABLET | Refills: 1 | Status: SHIPPED | OUTPATIENT
Start: 2024-10-15

## 2024-12-10 ENCOUNTER — OFFICE VISIT (OUTPATIENT)
Facility: CLINIC | Age: 54
End: 2024-12-10
Payer: COMMERCIAL

## 2024-12-10 VITALS
WEIGHT: 180 LBS | DIASTOLIC BLOOD PRESSURE: 78 MMHG | BODY MASS INDEX: 28.93 KG/M2 | OXYGEN SATURATION: 99 % | RESPIRATION RATE: 16 BRPM | TEMPERATURE: 98.2 F | HEART RATE: 75 BPM | HEIGHT: 66 IN | SYSTOLIC BLOOD PRESSURE: 122 MMHG

## 2024-12-10 DIAGNOSIS — R53.83 LOW ENERGY: ICD-10-CM

## 2024-12-10 DIAGNOSIS — M25.811 IMPINGEMENT OF RIGHT SHOULDER: Primary | ICD-10-CM

## 2024-12-10 DIAGNOSIS — E78.00 HYPERCHOLESTEREMIA: ICD-10-CM

## 2024-12-10 DIAGNOSIS — R25.2 MUSCLE CRAMPS: ICD-10-CM

## 2024-12-10 PROCEDURE — 99214 OFFICE O/P EST MOD 30 MIN: CPT | Performed by: FAMILY MEDICINE

## 2024-12-10 NOTE — PROGRESS NOTES
intake or lack of stretching. Statins typically cause myalgia, which is muscle pain, rather than cramping. His electrolyte levels were normal in June 2024. A CK level will be checked today to assess for any excessive muscle breakdown potentially caused by the statin.    3. Health maintenance.  His cholesterol levels will be checked today as it has been about 6 months since the last check. A testosterone level test will also be conducted.    PROCEDURE  The patient received a steroid injection in the shoulder a few months ago, which has significantly improved his mobility.      Return if symptoms worsen or fail to improve.    Pathophysiology, recovery and rehabilitation process discussed and questions answered   Counseling for 30 Minutes of the total visit duration   Pictures and figures used as necessary   Provided reassurance   Monitor response to Physical Therapy   Recommend  lower impact activities-walking, Eliptical, Nordic Track, cycling or swimming               I have discussed the diagnosis with the patient and the intended plan as seen in the above orders.  The patient has received an after-visit summary and questions were answered concerning future plans.     Medication Side Effects and Warnings were discussed with patient,  Patient Labs were reviewed and or requested, and  Patient Past Records were reviewed and or requested  yes     Pt agrees to call or return to clinic and/or go to closest ER with any worsening of symptoms.  This may include, but not limited to increased fever (>100.4) with NSAIDS or Tylenol, increased edema, confusion, rash, worsening of presenting symptoms.    Please note that this dictation was completed with [employer], the computer voice recognition software.  Quite often unanticipated grammatical, syntax, homophones, and other interpretive errors are inadvertently transcribed by the computer software.  Please disregard these errors.  Please excuse any errors that have escaped final

## 2024-12-11 LAB
CHOLEST SERPL-MCNC: 175 MG/DL (ref 100–199)
HDLC SERPL-MCNC: 31 MG/DL
LDLC SERPL CALC-MCNC: 105 MG/DL (ref 0–99)
SPECIMEN STATUS REPORT: NORMAL
TRIGL SERPL-MCNC: 222 MG/DL (ref 0–149)
VLDLC SERPL CALC-MCNC: 39 MG/DL (ref 5–40)

## 2024-12-12 DIAGNOSIS — R73.9 ELEVATED BLOOD SUGAR: Primary | ICD-10-CM

## 2024-12-12 LAB
ALBUMIN SERPL-MCNC: 4.2 G/DL (ref 3.8–4.9)
ALP SERPL-CCNC: 100 IU/L (ref 44–121)
ALT SERPL-CCNC: 21 IU/L (ref 0–44)
AST SERPL-CCNC: 20 IU/L (ref 0–40)
BILIRUB SERPL-MCNC: 0.7 MG/DL (ref 0–1.2)
BUN SERPL-MCNC: 10 MG/DL (ref 6–24)
BUN/CREAT SERPL: 9 (ref 9–20)
CALCIUM SERPL-MCNC: 9.2 MG/DL (ref 8.7–10.2)
CHLORIDE SERPL-SCNC: 100 MMOL/L (ref 96–106)
CK SERPL-CCNC: 90 U/L (ref 41–331)
CO2 SERPL-SCNC: 23 MMOL/L (ref 20–29)
CREAT SERPL-MCNC: 1.1 MG/DL (ref 0.76–1.27)
EGFRCR SERPLBLD CKD-EPI 2021: 80 ML/MIN/1.73
GLOBULIN SER CALC-MCNC: 2.4 G/DL (ref 1.5–4.5)
GLUCOSE SERPL-MCNC: 163 MG/DL (ref 70–99)
POTASSIUM SERPL-SCNC: 3.8 MMOL/L (ref 3.5–5.2)
PROT SERPL-MCNC: 6.6 G/DL (ref 6–8.5)
SODIUM SERPL-SCNC: 140 MMOL/L (ref 134–144)

## 2024-12-15 LAB
TESTOST FREE SERPL-MCNC: 3.6 PG/ML (ref 7.2–24)
TESTOST SERPL-MCNC: 241 NG/DL (ref 264–916)

## 2024-12-17 DIAGNOSIS — R79.89 LOW TESTOSTERONE IN MALE: Primary | ICD-10-CM

## 2025-03-10 SDOH — ECONOMIC STABILITY: FOOD INSECURITY: WITHIN THE PAST 12 MONTHS, YOU WORRIED THAT YOUR FOOD WOULD RUN OUT BEFORE YOU GOT MONEY TO BUY MORE.: NEVER TRUE

## 2025-03-10 SDOH — ECONOMIC STABILITY: FOOD INSECURITY: WITHIN THE PAST 12 MONTHS, THE FOOD YOU BOUGHT JUST DIDN'T LAST AND YOU DIDN'T HAVE MONEY TO GET MORE.: NEVER TRUE

## 2025-03-10 SDOH — ECONOMIC STABILITY: INCOME INSECURITY: IN THE LAST 12 MONTHS, WAS THERE A TIME WHEN YOU WERE NOT ABLE TO PAY THE MORTGAGE OR RENT ON TIME?: NO

## 2025-03-11 ENCOUNTER — OFFICE VISIT (OUTPATIENT)
Facility: CLINIC | Age: 55
End: 2025-03-11

## 2025-03-11 VITALS
HEART RATE: 58 BPM | OXYGEN SATURATION: 100 % | HEIGHT: 66 IN | RESPIRATION RATE: 16 BRPM | WEIGHT: 180 LBS | SYSTOLIC BLOOD PRESSURE: 118 MMHG | DIASTOLIC BLOOD PRESSURE: 77 MMHG | BODY MASS INDEX: 28.93 KG/M2 | TEMPERATURE: 98 F

## 2025-03-11 DIAGNOSIS — M25.572 ACUTE LEFT ANKLE PAIN: ICD-10-CM

## 2025-03-11 DIAGNOSIS — M77.11 RIGHT LATERAL EPICONDYLITIS: ICD-10-CM

## 2025-03-11 DIAGNOSIS — E78.00 HYPERCHOLESTEREMIA: ICD-10-CM

## 2025-03-11 DIAGNOSIS — M25.811 IMPINGEMENT OF RIGHT SHOULDER: Primary | ICD-10-CM

## 2025-03-11 DIAGNOSIS — S93.432A SPRAIN OF TIBIOFIBULAR LIGAMENT OF LEFT ANKLE, INITIAL ENCOUNTER: ICD-10-CM

## 2025-03-11 DIAGNOSIS — R73.9 ELEVATED BLOOD SUGAR: ICD-10-CM

## 2025-03-11 DIAGNOSIS — R79.89 LOW TESTOSTERONE IN MALE: ICD-10-CM

## 2025-03-11 RX ORDER — TRIAMCINOLONE ACETONIDE 40 MG/ML
40 INJECTION, SUSPENSION INTRA-ARTICULAR; INTRAMUSCULAR ONCE
Status: COMPLETED | OUTPATIENT
Start: 2025-03-11 | End: 2025-03-11

## 2025-03-11 RX ADMIN — TRIAMCINOLONE ACETONIDE 40 MG: 40 INJECTION, SUSPENSION INTRA-ARTICULAR; INTRAMUSCULAR at 12:00

## 2025-03-11 ASSESSMENT — PATIENT HEALTH QUESTIONNAIRE - PHQ9
6. FEELING BAD ABOUT YOURSELF - OR THAT YOU ARE A FAILURE OR HAVE LET YOURSELF OR YOUR FAMILY DOWN: NOT AT ALL
5. POOR APPETITE OR OVEREATING: NOT AT ALL
9. THOUGHTS THAT YOU WOULD BE BETTER OFF DEAD, OR OF HURTING YOURSELF: NOT AT ALL
8. MOVING OR SPEAKING SO SLOWLY THAT OTHER PEOPLE COULD HAVE NOTICED. OR THE OPPOSITE, BEING SO FIGETY OR RESTLESS THAT YOU HAVE BEEN MOVING AROUND A LOT MORE THAN USUAL: NOT AT ALL
1. LITTLE INTEREST OR PLEASURE IN DOING THINGS: NOT AT ALL
SUM OF ALL RESPONSES TO PHQ QUESTIONS 1-9: 0
4. FEELING TIRED OR HAVING LITTLE ENERGY: NOT AT ALL
SUM OF ALL RESPONSES TO PHQ QUESTIONS 1-9: 0
3. TROUBLE FALLING OR STAYING ASLEEP: NOT AT ALL
2. FEELING DOWN, DEPRESSED OR HOPELESS: NOT AT ALL
SUM OF ALL RESPONSES TO PHQ QUESTIONS 1-9: 0
SUM OF ALL RESPONSES TO PHQ QUESTIONS 1-9: 0
7. TROUBLE CONCENTRATING ON THINGS, SUCH AS READING THE NEWSPAPER OR WATCHING TELEVISION: NOT AT ALL
10. IF YOU CHECKED OFF ANY PROBLEMS, HOW DIFFICULT HAVE THESE PROBLEMS MADE IT FOR YOU TO DO YOUR WORK, TAKE CARE OF THINGS AT HOME, OR GET ALONG WITH OTHER PEOPLE: NOT DIFFICULT AT ALL

## 2025-03-11 NOTE — PROGRESS NOTES
Chief Complaint   Patient presents with    Shoulder Pain     Patient is here for right shoulder pain      he is a 54 y.o. year old male who presents for follow up of injury.     Follow Up Pain Assessment Encounter      Onset of Symptoms: Patient states he has had pain for years   ________________________________________________________________________  Description: Pain  has worsened      Pain Scale:(1-10): 4  Duration:  continuous  Radiation: none  What makes it better?:rest  What makes it worse?:strecthing  Medications tried: acetaminophen  Modalities tried:         Reviewed and agree with Nurse Note and duplicated in this note.  Reviewed PmHx, RxHx, FmHx, SocHx, AllgHx and updated and dated in the chart.    Family History   Problem Relation Age of Onset    Osteoarthritis Mother     High Cholesterol Mother             Hypertension Mother     Cancer Father                Past Medical History:   Diagnosis Date    Anxiety state, unspecified     Basal cell carcinoma     Calculus of kidney 2014    Left 6mm.  Left 1 mm.  Dr. Damian Chauhan.    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  Dr. Russ Penn.    Other and unspecified hyperlipidemia     Recinos splints 08    Dr. Harris.  Gastoc Equines    Shoulder pain 01    Right.  Tendinitis.  Dr. Memo Aguilera    Tinnitus     Right. Dr. Josselin Krishnamurthy      Social History     Socioeconomic History    Marital status: Single     Spouse name: None    Number of children: 0    Years of education: None    Highest education level: Some college, no degree   Tobacco Use    Smoking status: Former     Current packs/day: 0.00     Average packs/day: 0.5 packs/day for 1 year (0.5 ttl pk-yrs)     Types: Cigarettes     Start date: 1999     Quit date: 2000     Years since quittin.2    Smokeless tobacco: Never   Vaping Use    Vaping status: Every Day    Substances: Nicotine    Devices: Refillable tank   Substance and Sexual

## 2025-03-12 ENCOUNTER — RESULTS FOLLOW-UP (OUTPATIENT)
Facility: CLINIC | Age: 55
End: 2025-03-12

## 2025-03-12 LAB
CHOLEST SERPL-MCNC: 173 MG/DL (ref 100–199)
HBA1C MFR BLD: 5.7 % (ref 4.8–5.6)
HDLC SERPL-MCNC: 36 MG/DL
LDLC SERPL CALC-MCNC: 119 MG/DL (ref 0–99)
TRIGL SERPL-MCNC: 99 MG/DL (ref 0–149)
VLDLC SERPL CALC-MCNC: 18 MG/DL (ref 5–40)

## 2025-03-16 LAB
TESTOST FREE SERPL-MCNC: 5.8 PG/ML (ref 7.2–24)
TESTOST SERPL-MCNC: 376 NG/DL (ref 264–916)

## 2025-03-26 DIAGNOSIS — E78.00 HYPERCHOLESTEROLEMIA: ICD-10-CM

## 2025-03-27 RX ORDER — ROSUVASTATIN CALCIUM 5 MG/1
5 TABLET, COATED ORAL DAILY
Qty: 90 TABLET | Refills: 1 | Status: SHIPPED | OUTPATIENT
Start: 2025-03-27

## 2025-04-29 ENCOUNTER — OFFICE VISIT (OUTPATIENT)
Age: 55
End: 2025-04-29

## 2025-04-29 VITALS
SYSTOLIC BLOOD PRESSURE: 129 MMHG | OXYGEN SATURATION: 97 % | TEMPERATURE: 97.6 F | HEIGHT: 66 IN | BODY MASS INDEX: 28.93 KG/M2 | WEIGHT: 180 LBS | DIASTOLIC BLOOD PRESSURE: 83 MMHG | HEART RATE: 64 BPM | RESPIRATION RATE: 18 BRPM

## 2025-04-29 DIAGNOSIS — H69.93 DYSFUNCTION OF BOTH EUSTACHIAN TUBES: Primary | ICD-10-CM

## 2025-04-29 RX ORDER — CEFPROZIL 250 MG/1
250 TABLET, FILM COATED ORAL 2 TIMES DAILY
Qty: 20 TABLET | Refills: 0 | Status: SHIPPED | OUTPATIENT
Start: 2025-04-29 | End: 2025-05-09

## 2025-04-29 ASSESSMENT — ENCOUNTER SYMPTOMS
VOMITING: 0
RHINORRHEA: 1
NAUSEA: 0
SHORTNESS OF BREATH: 0
COUGH: 1
DIARRHEA: 0

## 2025-04-29 NOTE — PROGRESS NOTES
Subjective     Chief Complaint   Patient presents with    Cough     Ongoing productive cough yellowish green phlegm and sneezing since . Took augmentin for 7 days prescribed on  from [employer] at work. Was referred to PCP for which can't see him til Monday and [employer] suggested he get seen sooner than later.         Cough  Associated symptoms include rhinorrhea. Pertinent negatives include no chills, fever or shortness of breath.    54-year-old male who presents for cough sinus congestion going on for a proximal 2 weeks.  No fever chills nausea vomiting diarrhea or urinary symptoms.  Patient has been taking some over-the-counter medicines.  He also seen at employee health they put him on Augmentin but symptoms did not olivier.    Past Medical History:   Diagnosis Date    Anxiety state, unspecified     Basal cell carcinoma     Calculus of kidney 2014    Left 6mm.  Left 1 mm.  Dr. Damian Chauhan.    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  Dr. Russ Penn.    Other and unspecified hyperlipidemia     Recinos splints 08    Dr. Harris.  Gastoc Equines    Shoulder pain 01    Right.  Tendinitis.  Dr. Memo Aguilera    Tinnitus     Right. Dr. Josselin Krishnamurthy       Past Surgical History:   Procedure Laterality Date    EYE SURGERY  2005    Lasik    LITHOTRIPSY  2014    Left.  Dr. Damian Chauhan.    NERVE BLOCK  2010    C3-4, C4-5.  Dr. Vega.    OTHER SURGICAL HISTORY      birth carlos removed from Rt buttock    REFRACTIVE SURGERY  2005    SKIN BIOPSY      Dr. Sam Frank from left back of leg    TONSILLECTOMY      UROLOGICAL SURGERY  2014, 2014    Left KIDNEY STENT THEN REMOVAL.  DUE TO KIDNEY STONES.  Dr. Chauhan.    WISDOM TOOTH EXTRACTION         Family History   Problem Relation Age of Onset    Osteoarthritis Mother     High Cholesterol Mother             Hypertension Mother     Cancer Father

## 2025-04-29 NOTE — PATIENT INSTRUCTIONS
Patient will start prescribed medications, Mucinex D 12-hour, Flonase, Zyrtec and follow-up with PCP if needed

## 2025-06-23 ENCOUNTER — OFFICE VISIT (OUTPATIENT)
Facility: CLINIC | Age: 55
End: 2025-06-23
Payer: COMMERCIAL

## 2025-06-23 VITALS
DIASTOLIC BLOOD PRESSURE: 75 MMHG | HEART RATE: 67 BPM | BODY MASS INDEX: 28.77 KG/M2 | TEMPERATURE: 98.5 F | SYSTOLIC BLOOD PRESSURE: 116 MMHG | HEIGHT: 66 IN | OXYGEN SATURATION: 98 % | WEIGHT: 179 LBS | RESPIRATION RATE: 16 BRPM

## 2025-06-23 DIAGNOSIS — R73.9 ELEVATED BLOOD SUGAR: ICD-10-CM

## 2025-06-23 DIAGNOSIS — R79.89 LOW TESTOSTERONE IN MALE: ICD-10-CM

## 2025-06-23 DIAGNOSIS — M25.811 IMPINGEMENT OF RIGHT SHOULDER: Primary | ICD-10-CM

## 2025-06-23 DIAGNOSIS — M76.72 PERONEAL TENDINITIS OF LEFT LOWER EXTREMITY: ICD-10-CM

## 2025-06-23 DIAGNOSIS — E78.00 ELEVATED CHOLESTEROL: ICD-10-CM

## 2025-06-23 DIAGNOSIS — S93.432A SPRAIN OF TIBIOFIBULAR LIGAMENT OF LEFT ANKLE, INITIAL ENCOUNTER: ICD-10-CM

## 2025-06-23 PROCEDURE — 99214 OFFICE O/P EST MOD 30 MIN: CPT | Performed by: FAMILY MEDICINE

## 2025-06-23 RX ORDER — TRIAMCINOLONE ACETONIDE 40 MG/ML
40 INJECTION, SUSPENSION INTRA-ARTICULAR; INTRAMUSCULAR ONCE
Status: SHIPPED | OUTPATIENT
Start: 2025-06-23

## 2025-06-23 NOTE — PATIENT INSTRUCTIONS
Learning About Joint Injections  What are joint injections?     Joint injections are shots into a joint, such as the knee or shoulder. They are used to put in medicines, such as pain relievers and steroid medicines. Steroids can help reduce inflammation. A steroid shot can sometimes help with short-term pain relief when other treatments haven't worked.  How are they done?  First, the area over the joint will be cleaned. Your doctor may then use a tiny needle to numb the skin in the area where you will get the joint injection.  If a tiny needle is used to numb the area, your doctor will use another needle to inject the medicine. Your doctor may use a pain reliever, a steroid, or both. You may feel some pressure or discomfort.  Your doctor may put ice on the area before you go home.  What can you expect after a joint injection?  You will probably go home soon after your shot. You may have numbness around the joint for a few hours.  If your shot included both a pain reliever and a steroid, then the pain will probably go away right away. But it might come back after a few hours. This might happen if the pain reliever wears off and the steroid hasn't started to work yet. Steroids don't always work. But when they do, the pain relief can last for several days to a few months or longer.  Your doctor may tell you to use ice on the area. You can also use ice if the pain comes back. Put ice or a cold pack on your joint for 10 to 20 minutes at a time. Put a thin cloth between the ice and your skin.  Follow your doctor's instructions carefully.  Follow-up care is a key part of your treatment and safety. Be sure to make and go to all appointments, and call your doctor if you are having problems. It's also a good idea to know your test results and keep a list of the medicines you take.  Current as of: July 31, 2024  Content Version: 14.5  © 2024-2025 [employer].   Care instructions adapted under license by [facility]

## 2025-06-23 NOTE — PROGRESS NOTES
Chief Complaint   Patient presents with    Shoulder Pain     Patient is here for right shoulder pain     Ankle Pain     Patient is here for left ankle pain      he is a 54 y.o. year old male who presents for follow up of injury.     Follow Up Pain Assessment Encounter      Onset of Symptoms: Patient states he has had pain for months   ________________________________________________________________________  Description: Pain  is unchanged      Pain Scale:(1-10): 6  Duration:  continuous  Radiation: none  What makes it better?:rest  What makes it worse?:walking  Medications tried: acetaminophen, ibuprofen  Modalities tried:         Reviewed and agree with Nurse Note and duplicated in this note.  Reviewed PmHx, RxHx, FmHx, SocHx, AllgHx and updated and dated in the chart.    Family History   Problem Relation Age of Onset    Osteoarthritis Mother     High Cholesterol Mother             Hypertension Mother     Cancer Father                Past Medical History:   Diagnosis Date    Anxiety state, unspecified     Basal cell carcinoma     Calculus of kidney 2014    Left 6mm.  Left 1 mm.  Dr. Damian Chauhan.    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  Dr. Russ ePnn.    Other and unspecified hyperlipidemia     Recinos splints 08    Dr. Harris.  Gastoc Equines    Shoulder pain 01    Right.  Tendinitis.  Dr. Memo Aguilera    Tinnitus     Right. Dr. Josselin Krishnamurthy      Social History     Socioeconomic History    Marital status: Single    Number of children: 0    Highest education level: Some college, no degree   Tobacco Use    Smoking status: Former     Current packs/day: 0.00     Average packs/day: 0.5 packs/day for 1 year (0.5 ttl pk-yrs)     Types: Cigarettes     Start date: 1999     Quit date: 2000     Years since quittin.4    Smokeless tobacco: Never   Vaping Use    Vaping status: Every Day    Substances: Nicotine    Devices: Refillable tank   Substance and

## 2025-06-24 ENCOUNTER — RESULTS FOLLOW-UP (OUTPATIENT)
Facility: CLINIC | Age: 55
End: 2025-06-24

## 2025-06-24 LAB
CHOLEST SERPL-MCNC: 174 MG/DL (ref 100–199)
HDLC SERPL-MCNC: 38 MG/DL
LDLC SERPL CALC-MCNC: 115 MG/DL (ref 0–99)
TRIGL SERPL-MCNC: 113 MG/DL (ref 0–149)
VLDLC SERPL CALC-MCNC: 21 MG/DL (ref 5–40)

## 2025-06-24 RX ORDER — ROSUVASTATIN CALCIUM 10 MG/1
10 TABLET, COATED ORAL NIGHTLY
Qty: 90 TABLET | Refills: 3 | Status: SHIPPED | OUTPATIENT
Start: 2025-06-24

## 2025-06-26 LAB
EST. AVERAGE GLUCOSE BLD GHB EST-MCNC: 114 MG/DL
HBA1C MFR BLD: 5.6 %HB
TESTOST FREE SERPL-MCNC: 4.7 PG/ML (ref 7.2–24)
TESTOST SERPL-MCNC: 402 NG/DL (ref 264–916)

## 2025-07-02 ENCOUNTER — HOSPITAL ENCOUNTER (OUTPATIENT)
Dept: PHYSICAL THERAPY | Facility: HOSPITAL | Age: 55
Setting detail: RECURRING SERIES
Discharge: HOME | End: 2025-07-05
Payer: COMMERCIAL

## 2025-07-02 PROCEDURE — 97161 PT EVAL LOW COMPLEX 20 MIN: CPT

## 2025-07-02 PROCEDURE — 97112 NEUROMUSCULAR REEDUCATION: CPT

## 2025-07-02 PROCEDURE — 97140 MANUAL THERAPY 1/> REGIONS: CPT

## 2025-07-02 PROCEDURE — 97110 THERAPEUTIC EXERCISES: CPT

## 2025-07-02 NOTE — THERAPY EVALUATION
[facility], [facility],   a part of [address], [address]  [place] [address]  Phone: [phone]  Fax: [phone]           PHYSICAL THERAPY - MEDICARE EVALUATION/PLAN OF CARE NOTE (updated 3/23)      Date: 2025          Patient Name:  Monty Reyes :  1970   Medical   Diagnosis:  Other specified joint disorders, right shoulder [M25.811]  Sprain of tibiofibular ligament of left ankle, initial encounter [S93.432A] Treatment Diagnosis:  M25.572 LEFT ANKLE PAIN and pain in the joint of the left foot   and M25.511  RIGHT SHOULDER PAIN    Referral Source:  Alvarado Paul MD Provider #:  [id number]                Insurance: Payor: [facility] / Plan: [facility] [employer] / Product Type: *No Product type* /      Patient  verified yes     Visit #   Current  / Total 1 24   Time   In / Out 3:05 pm  4:00 pm    Total Treatment Time 55   Total Timed Codes  38   1:1 Treatment Time 38      [facility] Totals Reminder:  bill using total billable   min of TIMED therapeutic procedures and modalities.   8-22 min = 1 unit; 23-37 min = 2 units; 38-52 min = 3 units;  53-67 min = 4 units; 68-82 min = 5 units           SUBJECTIVE  If an interpreting service was utilized for treatment of this patient, the contents of this document represent the material reviewed with the patient via the .     Pain Level (0-10 scale): 2/10  []constant []intermittent []improving [x]worsening []no change since onset    Any medication changes, allergies to medications, adverse drug reactions, diagnosis change, or new procedure performed?: [x] No    [] Yes (see summary sheet for update)  Medications: Verified on Patient Summary List    Subjective functional status/changes:     Pt is a 54 year old who presents with chief complaints of L ankle pain which began 2024.   He reports that he has had multiple issues with [place] ankles [place]

## 2025-07-08 ENCOUNTER — HOSPITAL ENCOUNTER (OUTPATIENT)
Dept: PHYSICAL THERAPY | Facility: HOSPITAL | Age: 55
Setting detail: RECURRING SERIES
Discharge: HOME | End: 2025-07-11
Payer: COMMERCIAL

## 2025-07-08 PROCEDURE — 97016 VASOPNEUMATIC DEVICE THERAPY: CPT

## 2025-07-08 PROCEDURE — 97112 NEUROMUSCULAR REEDUCATION: CPT

## 2025-07-08 PROCEDURE — 97140 MANUAL THERAPY 1/> REGIONS: CPT

## 2025-07-08 PROCEDURE — 97110 THERAPEUTIC EXERCISES: CPT
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
## 2025-07-10 ENCOUNTER — HOSPITAL ENCOUNTER (OUTPATIENT)
Dept: PHYSICAL THERAPY | Facility: HOSPITAL | Age: 55
Setting detail: RECURRING SERIES
Discharge: HOME | End: 2025-07-13
Payer: COMMERCIAL

## 2025-07-10 PROCEDURE — 97016 VASOPNEUMATIC DEVICE THERAPY: CPT

## 2025-07-10 PROCEDURE — 97112 NEUROMUSCULAR REEDUCATION: CPT

## 2025-07-10 PROCEDURE — 97110 THERAPEUTIC EXERCISES: CPT

## 2025-07-10 NOTE — PROGRESS NOTES
PHYSICAL THERAPY - MEDICARE DAILY TREATMENT NOTE (updated 3/23)      Date: 7/10/2025          Patient Name:  Monty Reyes :  1970   Medical   Diagnosis:  Other specified joint disorders, right shoulder [M25.811]  Sprain of tibiofibular ligament of left ankle, initial encounter [S93.432A] Treatment Diagnosis:  M25.572 LEFT ANKLE PAIN and pain in the joint of the left foot   and M25.511  RIGHT SHOULDER PAIN    Referral Source:  Alvarado Paul MD Insurance:   Payor: [facility] / Plan: [facility] [employer]ERS / Product Type: *No Product type* /                     Patient  verified yes     Visit #   Current  / Total 3 24   Time   In / Out 1:46 pm  2:46   Total Treatment Time 60   Total Timed Codes 50   1:1 Treatment Time 45      [facility] Totals Reminder:  bill using total billable   min of TIMED therapeutic procedures and modalities.   8-22 min = 1 unit; 23-37 min = 2 units; 38-52 min = 3 units; 53-67 min = 4 units; 68-82 min = 5 units            SUBJECTIVE  If an interpreting service was utilized for treatment of this patient, the contents of this document represent the material reviewed with the patient via the .     Pain Level (0-10 scale): 2/10    Any medication changes, allergies to medications, adverse drug reactions, diagnosis change, or new procedure performed?: [x] No    [] Yes (see summary sheet for update)  Medications: Verified on Patient Summary List    Subjective functional status/changes:     Pt reports that his L ankle is less stiff feeling today and he has noticed some improvement in stability.     OBJECTIVE      Therapeutic Procedures:  Tx Min Billable or 1:1 Min (if diff from Tx Min) Procedure, Rationale, Specifics   [id number] [phone] [id number] Therapeutic Exercise (timed):  increase ROM, strength, coordination, balance, and proprioception to improve patient's ability to progress to PLOF and address remaining functional goals. (see flow sheet as applicable)     Details if applicable:

## 2025-07-15 ENCOUNTER — HOSPITAL ENCOUNTER (OUTPATIENT)
Dept: PHYSICAL THERAPY | Facility: HOSPITAL | Age: 55
Setting detail: RECURRING SERIES
Discharge: HOME | End: 2025-07-18
Payer: COMMERCIAL

## 2025-07-15 PROCEDURE — 97110 THERAPEUTIC EXERCISES: CPT

## 2025-07-15 PROCEDURE — 97112 NEUROMUSCULAR REEDUCATION: CPT

## 2025-07-15 PROCEDURE — 97016 VASOPNEUMATIC DEVICE THERAPY: CPT

## 2025-07-15 NOTE — PROGRESS NOTES
PHYSICAL THERAPY - MEDICARE DAILY TREATMENT NOTE (updated 3/23)      Date: 7/15/2025          Patient Name:  Monty Reyes :  1970   Medical   Diagnosis:  Other specified joint disorders, right shoulder [M25.811]  Sprain of tibiofibular ligament of left ankle, initial encounter [S93.432A] Treatment Diagnosis:  M25.572 LEFT ANKLE PAIN and pain in the joint of the left foot   and M25.511  RIGHT SHOULDER PAIN    Referral Source:  Alvarado Paul MD Insurance:   Payor: [facility] / Plan: [facility] [employer]ERS / Product Type: *No Product type* /                     Patient  verified yes     Visit #   Current  / Total 4 24   Time   In / Out 3:17 pm  4:10 pm    Total Treatment Time 53   Total Timed Codes 43   1:1 Treatment Time 38      [facility] Totals Reminder:  bill using total billable   min of TIMED therapeutic procedures and modalities.   8-22 min = 1 unit; 23-37 min = 2 units; 38-52 min = 3 units; 53-67 min = 4 units; 68-82 min = 5 units            SUBJECTIVE  If an interpreting service was utilized for treatment of this patient, the contents of this document represent the material reviewed with the patient via the .     Pain Level (0-10 scale): 2/10    Any medication changes, allergies to medications, adverse drug reactions, diagnosis change, or new procedure performed?: [x] No    [] Yes (see summary sheet for update)  Medications: Verified on Patient Summary List    Subjective functional status/changes:     Pt reports that his L ankle is less stiff feeling today and he has noticed some improvement in stability.     OBJECTIVE      Therapeutic Procedures:  Tx Min Billable or 1:1 Min (if diff from Tx Min) Procedure, Rationale, Specifics   15 [id number] Therapeutic Exercise (timed):  increase ROM, strength, coordination, balance, and proprioception to improve patient's ability to progress to PLOF and address remaining functional goals. (see flow sheet as applicable)     Details if applicable:

## 2025-07-17 ENCOUNTER — HOSPITAL ENCOUNTER (OUTPATIENT)
Dept: PHYSICAL THERAPY | Facility: HOSPITAL | Age: 55
Setting detail: RECURRING SERIES
Discharge: HOME | End: 2025-07-20
Payer: COMMERCIAL

## 2025-07-17 PROCEDURE — 97110 THERAPEUTIC EXERCISES: CPT

## 2025-07-17 PROCEDURE — 97016 VASOPNEUMATIC DEVICE THERAPY: CPT

## 2025-07-17 PROCEDURE — 97112 NEUROMUSCULAR REEDUCATION: CPT

## 2025-07-17 NOTE — PROGRESS NOTES
PHYSICAL THERAPY - MEDICARE DAILY TREATMENT NOTE (updated 3/23)      Date: 2025          Patient Name:  Monty Reyes :  1970   Medical   Diagnosis:  Other specified joint disorders, right shoulder [M25.811]  Sprain of tibiofibular ligament of left ankle, initial encounter [S93.432A] Treatment Diagnosis:  M25.572 LEFT ANKLE PAIN and pain in the joint of the left foot   and M25.511  RIGHT SHOULDER PAIN    Referral Source:  Alvarado Paul MD Insurance:   Payor: [facility] / Plan: [facility] [employer]ERS / Product Type: *No Product type* /                     Patient  verified yes     Visit #   Current  / Total 5 24   Time   In / Out 3:15 pm  4:20 pm    Total Treatment Time 65   Total Timed Codes 55   1:1 Treatment Time 53      [facility] Totals Reminder:  bill using total billable   min of TIMED therapeutic procedures and modalities.   8-22 min = 1 unit; 23-37 min = 2 units; 38-52 min = 3 units; 53-67 min = 4 units; 68-82 min = 5 units            SUBJECTIVE  If an interpreting service was utilized for treatment of this patient, the contents of this document represent the material reviewed with the patient via the .     Pain Level (0-10 scale): 2/10    Any medication changes, allergies to medications, adverse drug reactions, diagnosis change, or new procedure performed?: [x] No    [] Yes (see summary sheet for update)  Medications: Verified on Patient Summary List    Subjective functional status/changes:     Pt denies pain in the ankle but he still lacks confidence when stepping down onto that leg and is nervous the ankle will give out on him.     OBJECTIVE      Therapeutic Procedures:  Tx Min Billable or 1:1 Min (if diff from Tx Min) Procedure, Rationale, Specifics   25 [id number] Therapeutic Exercise (timed):  increase ROM, strength, coordination, balance, and proprioception to improve patient's ability to progress to PLOF and address remaining functional goals. (see flow sheet as

## 2025-07-31 ENCOUNTER — HOSPITAL ENCOUNTER (OUTPATIENT)
Dept: PHYSICAL THERAPY | Facility: HOSPITAL | Age: 55
Setting detail: RECURRING SERIES
End: 2025-07-31
Payer: COMMERCIAL

## 2025-07-31 PROCEDURE — 97016 VASOPNEUMATIC DEVICE THERAPY: CPT

## 2025-07-31 PROCEDURE — 97112 NEUROMUSCULAR REEDUCATION: CPT

## 2025-07-31 PROCEDURE — 97110 THERAPEUTIC EXERCISES: CPT

## 2025-07-31 PROCEDURE — 20560 NDL INSJ W/O NJX 1 OR 2 MUSC: CPT

## 2025-07-31 NOTE — PROGRESS NOTES
PHYSICAL THERAPY - MEDICARE DAILY TREATMENT NOTE (updated 3/23)      Date: 2025          Patient Name:  Monty Reyes :  1970   Medical   Diagnosis:  Other specified joint disorders, right shoulder [M25.811]  Sprain of tibiofibular ligament of left ankle, initial encounter [S93.432A] Treatment Diagnosis:  M25.572 LEFT ANKLE PAIN and pain in the joint of the left foot   and M25.511  RIGHT SHOULDER PAIN    Referral Source:  Alvarado Paul MD Insurance:   Payor: [facility] / Plan: [facility] [employer]EEPERS / Product Type: *No Product type* /                     Patient  verified yes     Visit #   Current  / Total 6 24   Time   In / Out 3:15 pm  4:07 pm    Total Treatment Time 52   Total Timed Codes 35   1:1 Treatment Time 30      [facility] Totals Reminder:  bill using total billable   min of TIMED therapeutic procedures and modalities.   8-22 min = 1 unit; 23-37 min = 2 units; 38-52 min = 3 units; 53-67 min = 4 units; 68-82 min = 5 units            SUBJECTIVE  If an interpreting service was utilized for treatment of this patient, the contents of this document represent the material reviewed with the patient via the .     Pain Level (0-10 scale): 2/10    Any medication changes, allergies to medications, adverse drug reactions, diagnosis change, or new procedure performed?: [x] No    [] Yes (see summary sheet for update)  Medications: Verified on Patient Summary List    Subjective functional status/changes:     Pt reports continued weakness in the L ankle. He notes some improvement in stability and will continues to be working on the heel raises but is limited with SL heel raise on the L.     OBJECTIVE      Therapeutic Procedures:  Tx Min Billable or 1:1 Min (if diff from Tx Min) Procedure, Rationale, Specifics   20 [phone] [id number] Therapeutic Exercise (timed):  increase ROM, strength, coordination, balance, and proprioception to improve patient's ability to progress to PLOF and address remaining

## 2025-08-18 ENCOUNTER — OFFICE VISIT (OUTPATIENT)
Age: 55
End: 2025-08-18
Payer: COMMERCIAL

## 2025-08-18 VITALS
HEIGHT: 66 IN | OXYGEN SATURATION: 98 % | DIASTOLIC BLOOD PRESSURE: 70 MMHG | TEMPERATURE: 97.8 F | SYSTOLIC BLOOD PRESSURE: 128 MMHG | RESPIRATION RATE: 16 BRPM | BODY MASS INDEX: 28.89 KG/M2 | HEART RATE: 61 BPM

## 2025-08-18 DIAGNOSIS — M54.2 CERVICALGIA: Primary | ICD-10-CM

## 2025-08-18 DIAGNOSIS — G89.29 CHRONIC RIGHT SHOULDER PAIN: ICD-10-CM

## 2025-08-18 DIAGNOSIS — R25.2 CRAMP AND SPASM: ICD-10-CM

## 2025-08-18 DIAGNOSIS — M25.511 CHRONIC RIGHT SHOULDER PAIN: ICD-10-CM

## 2025-08-18 PROCEDURE — 99213 OFFICE O/P EST LOW 20 MIN: CPT | Performed by: PSYCHIATRY & NEUROLOGY

## 2025-08-18 ASSESSMENT — PATIENT HEALTH QUESTIONNAIRE - PHQ9
SUM OF ALL RESPONSES TO PHQ QUESTIONS 1-9: 0
SUM OF ALL RESPONSES TO PHQ QUESTIONS 1-9: 0
2. FEELING DOWN, DEPRESSED OR HOPELESS: NOT AT ALL
SUM OF ALL RESPONSES TO PHQ QUESTIONS 1-9: 0
SUM OF ALL RESPONSES TO PHQ QUESTIONS 1-9: 0
1. LITTLE INTEREST OR PLEASURE IN DOING THINGS: NOT AT ALL

## 2025-08-19 ENCOUNTER — HOSPITAL ENCOUNTER (OUTPATIENT)
Dept: PHYSICAL THERAPY | Facility: HOSPITAL | Age: 55
Setting detail: RECURRING SERIES
Discharge: HOME | End: 2025-08-22
Payer: COMMERCIAL

## 2025-08-19 PROCEDURE — 97110 THERAPEUTIC EXERCISES: CPT

## 2025-08-19 PROCEDURE — 97112 NEUROMUSCULAR REEDUCATION: CPT

## 2025-08-25 ENCOUNTER — OFFICE VISIT (OUTPATIENT)
Facility: CLINIC | Age: 55
End: 2025-08-25
Payer: COMMERCIAL

## 2025-08-25 VITALS
SYSTOLIC BLOOD PRESSURE: 119 MMHG | RESPIRATION RATE: 16 BRPM | WEIGHT: 179 LBS | HEIGHT: 66 IN | OXYGEN SATURATION: 98 % | HEART RATE: 57 BPM | TEMPERATURE: 98.3 F | DIASTOLIC BLOOD PRESSURE: 82 MMHG | BODY MASS INDEX: 28.77 KG/M2

## 2025-08-25 DIAGNOSIS — S93.432A SPRAIN OF TIBIOFIBULAR LIGAMENT OF LEFT ANKLE, INITIAL ENCOUNTER: ICD-10-CM

## 2025-08-25 DIAGNOSIS — M76.72 PERONEAL TENDINITIS OF LEFT LOWER EXTREMITY: ICD-10-CM

## 2025-08-25 DIAGNOSIS — Z00.00 VISIT FOR WELL MAN HEALTH CHECK: Primary | ICD-10-CM

## 2025-08-25 DIAGNOSIS — E78.00 HYPERCHOLESTEROLEMIA: ICD-10-CM

## 2025-08-25 DIAGNOSIS — Z00.00 VISIT FOR WELL MAN HEALTH CHECK: ICD-10-CM

## 2025-08-25 PROCEDURE — 99396 PREV VISIT EST AGE 40-64: CPT | Performed by: FAMILY MEDICINE

## 2025-08-25 PROCEDURE — 99214 OFFICE O/P EST MOD 30 MIN: CPT | Performed by: FAMILY MEDICINE

## 2025-08-26 LAB
ALBUMIN SERPL-MCNC: 4.4 G/DL (ref 3.8–4.9)
ALP SERPL-CCNC: 101 IU/L (ref 44–121)
ALT SERPL-CCNC: 14 IU/L (ref 0–44)
AST SERPL-CCNC: 15 IU/L (ref 0–40)
BASOPHILS # BLD AUTO: 0 X10E3/UL (ref 0–0.2)
BASOPHILS NFR BLD AUTO: 0 %
BILIRUB SERPL-MCNC: 0.6 MG/DL (ref 0–1.2)
BUN SERPL-MCNC: 15 MG/DL (ref 6–24)
BUN/CREAT SERPL: 15 (ref 9–20)
CALCIUM SERPL-MCNC: 9.4 MG/DL (ref 8.7–10.2)
CHLORIDE SERPL-SCNC: 102 MMOL/L (ref 96–106)
CHOLEST SERPL-MCNC: 155 MG/DL (ref 100–199)
CO2 SERPL-SCNC: 24 MMOL/L (ref 20–29)
CREAT SERPL-MCNC: 1.01 MG/DL (ref 0.76–1.27)
EGFRCR SERPLBLD CKD-EPI 2021: 88 ML/MIN/1.73
EOSINOPHIL # BLD AUTO: 0.2 X10E3/UL (ref 0–0.4)
EOSINOPHIL NFR BLD AUTO: 3 %
ERYTHROCYTE [DISTWIDTH] IN BLOOD BY AUTOMATED COUNT: 12.7 % (ref 11.6–15.4)
GLOBULIN SER CALC-MCNC: 2 G/DL (ref 1.5–4.5)
GLUCOSE SERPL-MCNC: 98 MG/DL (ref 70–99)
HCT VFR BLD AUTO: 43.1 % (ref 37.5–51)
HDLC SERPL-MCNC: 38 MG/DL
HGB BLD-MCNC: 13.9 G/DL (ref 13–17.7)
IMM GRANULOCYTES # BLD AUTO: 0 X10E3/UL (ref 0–0.1)
IMM GRANULOCYTES NFR BLD AUTO: 0 %
LDLC SERPL CALC-MCNC: 99 MG/DL (ref 0–99)
LYMPHOCYTES # BLD AUTO: 1.8 X10E3/UL (ref 0.7–3.1)
LYMPHOCYTES NFR BLD AUTO: 26 %
MCH RBC QN AUTO: 29.3 PG (ref 26.6–33)
MCHC RBC AUTO-ENTMCNC: 32.3 G/DL (ref 31.5–35.7)
MCV RBC AUTO: 91 FL (ref 79–97)
MONOCYTES # BLD AUTO: 0.3 X10E3/UL (ref 0.1–0.9)
MONOCYTES NFR BLD AUTO: 5 %
NEUTROPHILS # BLD AUTO: 4.5 X10E3/UL (ref 1.4–7)
NEUTROPHILS NFR BLD AUTO: 66 %
PLATELET # BLD AUTO: 231 X10E3/UL (ref 150–450)
POTASSIUM SERPL-SCNC: 4.1 MMOL/L (ref 3.5–5.2)
PROT SERPL-MCNC: 6.4 G/DL (ref 6–8.5)
PSA SERPL-MCNC: 1.4 NG/ML (ref 0–4)
RBC # BLD AUTO: 4.74 X10E6/UL (ref 4.14–5.8)
SODIUM SERPL-SCNC: 140 MMOL/L (ref 134–144)
TRIGL SERPL-MCNC: 96 MG/DL (ref 0–149)
VLDLC SERPL CALC-MCNC: 18 MG/DL (ref 5–40)
WBC # BLD AUTO: 6.9 X10E3/UL (ref 3.4–10.8)

## 2025-09-04 ENCOUNTER — HOSPITAL ENCOUNTER (OUTPATIENT)
Facility: HOSPITAL | Age: 55
Discharge: HOME | End: 2025-09-04
Attending: FAMILY MEDICINE
Payer: COMMERCIAL

## 2025-09-04 DIAGNOSIS — M76.72 PERONEAL TENDINITIS OF LEFT LOWER EXTREMITY: ICD-10-CM

## 2025-09-04 DIAGNOSIS — S93.432A SPRAIN OF TIBIOFIBULAR LIGAMENT OF LEFT ANKLE, INITIAL ENCOUNTER: ICD-10-CM

## 2025-09-04 PROCEDURE — 73721 MRI JNT OF LWR EXTRE W/O DYE: CPT
